# Patient Record
Sex: FEMALE | Race: WHITE | NOT HISPANIC OR LATINO | Employment: OTHER | URBAN - METROPOLITAN AREA
[De-identification: names, ages, dates, MRNs, and addresses within clinical notes are randomized per-mention and may not be internally consistent; named-entity substitution may affect disease eponyms.]

---

## 2019-02-11 ENCOUNTER — HOSPITAL ENCOUNTER (INPATIENT)
Facility: CLINIC | Age: 79
LOS: 15 days | Discharge: NURSING, REHAB OR LONG-TERM CARE | DRG: 383 | End: 2019-02-26
Attending: EMERGENCY MEDICINE | Admitting: INTERNAL MEDICINE
Payer: MEDICARE

## 2019-02-11 ENCOUNTER — APPOINTMENT (OUTPATIENT)
Dept: CT IMAGING | Facility: CLINIC | Age: 79
DRG: 383 | End: 2019-02-11
Attending: EMERGENCY MEDICINE
Payer: MEDICARE

## 2019-02-11 DIAGNOSIS — I63.9 CEREBROVASCULAR ACCIDENT (CVA), UNSPECIFIED MECHANISM (H): Primary | ICD-10-CM

## 2019-02-11 DIAGNOSIS — K25.4 GASTROINTESTINAL HEMORRHAGE ASSOCIATED WITH GASTRIC ULCER: ICD-10-CM

## 2019-02-11 DIAGNOSIS — E87.6 HYPOKALEMIA: ICD-10-CM

## 2019-02-11 DIAGNOSIS — I10 ESSENTIAL HYPERTENSION: ICD-10-CM

## 2019-02-11 DIAGNOSIS — I50.33 ACUTE ON CHRONIC DIASTOLIC CONGESTIVE HEART FAILURE (H): ICD-10-CM

## 2019-02-11 DIAGNOSIS — G40.909 SEIZURE DISORDER (H): ICD-10-CM

## 2019-02-11 DIAGNOSIS — F03.90 DEMENTIA WITHOUT BEHAVIORAL DISTURBANCE, UNSPECIFIED DEMENTIA TYPE: ICD-10-CM

## 2019-02-11 LAB
ALBUMIN SERPL-MCNC: 3.7 G/DL (ref 3.4–5)
ALP SERPL-CCNC: 77 U/L (ref 40–150)
ALT SERPL W P-5'-P-CCNC: 15 U/L (ref 0–50)
ANION GAP SERPL CALCULATED.3IONS-SCNC: 9 MMOL/L (ref 3–14)
ANISOCYTOSIS BLD QL SMEAR: SLIGHT
AST SERPL W P-5'-P-CCNC: 18 U/L (ref 0–45)
BASOPHILS # BLD AUTO: 0 10E9/L (ref 0–0.2)
BASOPHILS NFR BLD AUTO: 0.5 %
BILIRUB SERPL-MCNC: 0.4 MG/DL (ref 0.2–1.3)
BUN SERPL-MCNC: 14 MG/DL (ref 7–30)
CALCIUM SERPL-MCNC: 8.4 MG/DL (ref 8.5–10.1)
CHLORIDE SERPL-SCNC: 96 MMOL/L (ref 94–109)
CO2 SERPL-SCNC: 33 MMOL/L (ref 20–32)
CREAT SERPL-MCNC: 1.01 MG/DL (ref 0.52–1.04)
DIFFERENTIAL METHOD BLD: ABNORMAL
ELLIPTOCYTES BLD QL SMEAR: SLIGHT
EOSINOPHIL # BLD AUTO: 0 10E9/L (ref 0–0.7)
EOSINOPHIL NFR BLD AUTO: 0.3 %
ERYTHROCYTE [DISTWIDTH] IN BLOOD BY AUTOMATED COUNT: 15.3 % (ref 10–15)
GFR SERPL CREATININE-BSD FRML MDRD: 52 ML/MIN/{1.73_M2}
GLUCOSE SERPL-MCNC: 107 MG/DL (ref 70–99)
HCT VFR BLD AUTO: 37.5 % (ref 35–47)
HGB BLD-MCNC: 11.6 G/DL (ref 11.7–15.7)
IMM GRANULOCYTES # BLD: 0 10E9/L (ref 0–0.4)
IMM GRANULOCYTES NFR BLD: 0.2 %
LIPASE SERPL-CCNC: 39 U/L (ref 73–393)
LYMPHOCYTES # BLD AUTO: 1.2 10E9/L (ref 0.8–5.3)
LYMPHOCYTES NFR BLD AUTO: 19.2 %
MCH RBC QN AUTO: 26.9 PG (ref 26.5–33)
MCHC RBC AUTO-ENTMCNC: 30.9 G/DL (ref 31.5–36.5)
MCV RBC AUTO: 87 FL (ref 78–100)
MONOCYTES # BLD AUTO: 1.3 10E9/L (ref 0–1.3)
MONOCYTES NFR BLD AUTO: 20.3 %
NEUTROPHILS # BLD AUTO: 3.8 10E9/L (ref 1.6–8.3)
NEUTROPHILS NFR BLD AUTO: 59.5 %
NRBC # BLD AUTO: 0 10*3/UL
NRBC BLD AUTO-RTO: 0 /100
NT-PROBNP SERPL-MCNC: 5531 PG/ML (ref 0–1800)
PLATELET # BLD AUTO: 245 10E9/L (ref 150–450)
PLATELET # BLD EST: ABNORMAL 10*3/UL
POTASSIUM SERPL-SCNC: 2.7 MMOL/L (ref 3.4–5.3)
PROT SERPL-MCNC: 6.9 G/DL (ref 6.8–8.8)
RBC # BLD AUTO: 4.31 10E12/L (ref 3.8–5.2)
SODIUM SERPL-SCNC: 138 MMOL/L (ref 133–144)
WBC # BLD AUTO: 6.4 10E9/L (ref 4–11)

## 2019-02-11 PROCEDURE — 99222 1ST HOSP IP/OBS MODERATE 55: CPT | Mod: AI | Performed by: INTERNAL MEDICINE

## 2019-02-11 PROCEDURE — 83880 ASSAY OF NATRIURETIC PEPTIDE: CPT | Performed by: EMERGENCY MEDICINE

## 2019-02-11 PROCEDURE — 83690 ASSAY OF LIPASE: CPT | Performed by: EMERGENCY MEDICINE

## 2019-02-11 PROCEDURE — 82565 ASSAY OF CREATININE: CPT

## 2019-02-11 PROCEDURE — 25000125 ZZHC RX 250: Performed by: EMERGENCY MEDICINE

## 2019-02-11 PROCEDURE — 99285 EMERGENCY DEPT VISIT HI MDM: CPT | Mod: 25

## 2019-02-11 PROCEDURE — 96368 THER/DIAG CONCURRENT INF: CPT

## 2019-02-11 PROCEDURE — 12000000 ZZH R&B MED SURG/OB

## 2019-02-11 PROCEDURE — 96366 THER/PROPH/DIAG IV INF ADDON: CPT

## 2019-02-11 PROCEDURE — 96365 THER/PROPH/DIAG IV INF INIT: CPT

## 2019-02-11 PROCEDURE — 25000128 H RX IP 250 OP 636: Performed by: EMERGENCY MEDICINE

## 2019-02-11 PROCEDURE — 85025 COMPLETE CBC W/AUTO DIFF WBC: CPT | Performed by: EMERGENCY MEDICINE

## 2019-02-11 PROCEDURE — 25000132 ZZH RX MED GY IP 250 OP 250 PS 637: Mod: GY | Performed by: EMERGENCY MEDICINE

## 2019-02-11 PROCEDURE — 80053 COMPREHEN METABOLIC PANEL: CPT | Performed by: EMERGENCY MEDICINE

## 2019-02-11 PROCEDURE — 96375 TX/PRO/DX INJ NEW DRUG ADDON: CPT

## 2019-02-11 PROCEDURE — 93005 ELECTROCARDIOGRAM TRACING: CPT

## 2019-02-11 PROCEDURE — 74177 CT ABD & PELVIS W/CONTRAST: CPT

## 2019-02-11 PROCEDURE — A9270 NON-COVERED ITEM OR SERVICE: HCPCS | Mod: GY | Performed by: EMERGENCY MEDICINE

## 2019-02-11 PROCEDURE — 25800030 ZZH RX IP 258 OP 636: Performed by: EMERGENCY MEDICINE

## 2019-02-11 RX ORDER — BUMETANIDE 1 MG/1
2 TABLET ORAL EVERY MORNING
Status: ON HOLD | COMMUNITY
End: 2019-02-25

## 2019-02-11 RX ORDER — OXYCODONE AND ACETAMINOPHEN 5; 325 MG/1; MG/1
1 TABLET ORAL ONCE
Status: DISCONTINUED | OUTPATIENT
Start: 2019-02-11 | End: 2019-02-11

## 2019-02-11 RX ORDER — ASPIRIN 81 MG/1
81 TABLET ORAL DAILY
Status: ON HOLD | COMMUNITY
End: 2019-02-26

## 2019-02-11 RX ORDER — NITROGLYCERIN 0.4 MG/1
0.4 TABLET SUBLINGUAL EVERY 5 MIN PRN
COMMUNITY

## 2019-02-11 RX ORDER — GABAPENTIN 600 MG/1
600 TABLET ORAL 2 TIMES DAILY
Status: ON HOLD | COMMUNITY
End: 2019-02-25

## 2019-02-11 RX ORDER — ONDANSETRON 2 MG/ML
4 INJECTION INTRAMUSCULAR; INTRAVENOUS ONCE
Status: COMPLETED | OUTPATIENT
Start: 2019-02-11 | End: 2019-02-11

## 2019-02-11 RX ORDER — OXYCODONE AND ACETAMINOPHEN 5; 325 MG/1; MG/1
1 TABLET ORAL DAILY PRN
Status: ON HOLD | COMMUNITY
End: 2019-02-25

## 2019-02-11 RX ORDER — LANOLIN ALCOHOL/MO/W.PET/CERES
1000 CREAM (GRAM) TOPICAL DAILY
COMMUNITY

## 2019-02-11 RX ORDER — ALBUTEROL SULFATE 90 UG/1
2-4 AEROSOL, METERED RESPIRATORY (INHALATION) EVERY 4 HOURS PRN
COMMUNITY

## 2019-02-11 RX ORDER — CARVEDILOL 12.5 MG/1
12.5 TABLET ORAL 2 TIMES DAILY WITH MEALS
Status: ON HOLD | COMMUNITY
End: 2019-02-25

## 2019-02-11 RX ORDER — OXYBUTYNIN CHLORIDE 15 MG/1
15 TABLET, EXTENDED RELEASE ORAL DAILY
Status: ON HOLD | COMMUNITY
End: 2019-02-25

## 2019-02-11 RX ORDER — ATORVASTATIN CALCIUM 20 MG/1
20 TABLET, FILM COATED ORAL DAILY
Status: ON HOLD | COMMUNITY
End: 2019-02-25

## 2019-02-11 RX ORDER — AMLODIPINE BESYLATE 10 MG/1
10 TABLET ORAL DAILY
Status: ON HOLD | COMMUNITY
End: 2019-02-25

## 2019-02-11 RX ORDER — ACETAMINOPHEN 325 MG/1
325-650 TABLET ORAL EVERY 4 HOURS PRN
COMMUNITY

## 2019-02-11 RX ORDER — POTASSIUM CHLORIDE 1.5 G/1.58G
40 POWDER, FOR SOLUTION ORAL ONCE
Status: COMPLETED | OUTPATIENT
Start: 2019-02-11 | End: 2019-02-11

## 2019-02-11 RX ORDER — POTASSIUM CHLORIDE 1500 MG/1
20 TABLET, EXTENDED RELEASE ORAL DAILY
Status: ON HOLD | COMMUNITY
End: 2019-02-25

## 2019-02-11 RX ORDER — POTASSIUM CL/LIDO/0.9 % NACL 10MEQ/0.1L
10 INTRAVENOUS SOLUTION, PIGGYBACK (ML) INTRAVENOUS
Status: DISCONTINUED | OUTPATIENT
Start: 2019-02-11 | End: 2019-02-16

## 2019-02-11 RX ORDER — BUMETANIDE 1 MG/1
1 TABLET ORAL
Status: ON HOLD | COMMUNITY
End: 2019-02-25

## 2019-02-11 RX ORDER — IOPAMIDOL 755 MG/ML
500 INJECTION, SOLUTION INTRAVASCULAR ONCE
Status: COMPLETED | OUTPATIENT
Start: 2019-02-11 | End: 2019-02-11

## 2019-02-11 RX ORDER — LISINOPRIL 5 MG/1
5 TABLET ORAL DAILY
Status: ON HOLD | COMMUNITY
End: 2019-02-25

## 2019-02-11 RX ADMIN — IOPAMIDOL 80 ML: 755 INJECTION, SOLUTION INTRAVENOUS at 19:37

## 2019-02-11 RX ADMIN — Medication 2 G: at 21:41

## 2019-02-11 RX ADMIN — POTASSIUM CHLORIDE 10 MEQ: 149 INJECTION, SOLUTION, CONCENTRATE INTRAVENOUS at 20:30

## 2019-02-11 RX ADMIN — SODIUM CHLORIDE 61 ML: 9 INJECTION, SOLUTION INTRAVENOUS at 19:37

## 2019-02-11 RX ADMIN — LIDOCAINE HYDROCHLORIDE 30 ML: 20 SOLUTION ORAL; TOPICAL at 21:33

## 2019-02-11 RX ADMIN — POTASSIUM CHLORIDE 40 MEQ: 1.5 POWDER, FOR SOLUTION ORAL at 20:30

## 2019-02-11 RX ADMIN — ONDANSETRON 4 MG: 2 INJECTION INTRAMUSCULAR; INTRAVENOUS at 19:19

## 2019-02-11 ASSESSMENT — ENCOUNTER SYMPTOMS
HEMATURIA: 0
FREQUENCY: 0
SHORTNESS OF BREATH: 0
VOMITING: 1
APPETITE CHANGE: 0
DYSURIA: 0
ABDOMINAL PAIN: 1

## 2019-02-11 NOTE — LETTER
Transition Communication Hand-off for Care Transitions to Next Level of Care Provider    Name: Rhonda Mathur  : 1940  MRN #: [id number]  Primary Care Provider: Diego Taveras  Primary Care MD Name: Darcy   Primary Clinic: [facility] [address] [facility] [address]  Primary Care Clinic Name: [facility]   Reason for Hospitalization:  Hypokalemia [E87.6]  Acute on chronic diastolic congestive heart failure (H) [I50.33]  Acute cerebrovascular accident (CVA) (H) [I63.9]  Admit Date/Time: 2019  6:35 PM  Discharge Date: 19  Payor Source: Payor: MEDICARE / Plan: MEDICARE / Product Type: Medicare /     Readmission Assessment Measure (LINA) Risk Score/category: Average          Reason for Communication Hand-off Referral: Admission diagnoses: CHF  Fragility  Difficulty understanding plan of care    Discharge Plan: [facility]       Concern for non-adherence with plan of care:   No  Discharge Needs Assessment:  Needs      Most Recent Value   Equipment Currently Used at Home  none   # of Referrals Placed by CTS  Post Acute Facilities   Transitional Care  -- [[facility]]          Already enrolled in Tele-monitoring program and name of program:  NA  Follow-up specialty is recommended: No    Follow-up plan:    Future Appointments   Date Time Provider Department Center   2019  8:00 AM Enedina Billy PTA [facility]       Any outstanding tests or procedures:    Procedures     Future Labs/Procedures    HC ZIO PATCH 48 HOURS APPLICATION     Scheduling Instructions:    For 5 days          Referrals     Future Labs/Procedures    Occupational Therapy Adult Consult     Comments:    Evaluate and treat as clinically indicated.    Reason:  weakness    Physical Therapy Adult Consult     Comments:    Evaluate and treat as clinically indicated.    Reason:  weakness            Key Recommendations:  CTS following assessing for needs in 79 year old pt with hx of  "afib/chf admitted with hypokalemia.     Pt reports that she lives independently in her own apartment at the same senior living facility as her  that is currently in Memory Care.  She spends a lot of her day if not all of her day visiting and caring for him, [place] right [place] the [place]. She did explain to me that she is understanding that she has CHF, she does not weigh her self daily \" I did at first but I was never gaining wt so I stopped\" and rarely uses salt.  She explains that she was supposed to follow up with her Cardiologist at [facility] \"in the Summer\" but never did.   Patient was admitted with an acute stroke and partial seizures. Neurology was consulted and following with patient. Patient has mild to moderate dementia. Neurology started keppra and other medications. It is recommended that patient have LFT labs checked at next appointment.     Please follow pt closely upon return home for on going assessment in the care of her CHF, also assessing for medication understanding and compliance.     Sally Warner & Alesha Lopez    AVS/Discharge Summary is the source of truth; this is a helpful guide for improved communication of patient story          "

## 2019-02-11 NOTE — ED TRIAGE NOTES
Sent to the ED with upper abdominal pain. REports has been having pain intermittently for the past several months. States pain is worse after eating. Also has been nauseated.

## 2019-02-12 PROBLEM — E87.6 HYPOKALEMIA: Status: ACTIVE | Noted: 2019-02-12

## 2019-02-12 LAB
ANION GAP SERPL CALCULATED.3IONS-SCNC: 6 MMOL/L (ref 3–14)
BUN SERPL-MCNC: 12 MG/DL (ref 7–30)
CALCIUM SERPL-MCNC: 8.5 MG/DL (ref 8.5–10.1)
CHLORIDE SERPL-SCNC: 101 MMOL/L (ref 94–109)
CO2 SERPL-SCNC: 31 MMOL/L (ref 20–32)
CREAT BLD-MCNC: 1.1 MG/DL (ref 0.52–1.04)
CREAT SERPL-MCNC: 0.94 MG/DL (ref 0.52–1.04)
GFR SERPL CREATININE-BSD FRML MDRD: 48 ML/MIN/{1.73_M2}
GFR SERPL CREATININE-BSD FRML MDRD: 57 ML/MIN/{1.73_M2}
GLUCOSE BLDC GLUCOMTR-MCNC: 104 MG/DL (ref 70–99)
GLUCOSE BLDC GLUCOMTR-MCNC: 107 MG/DL (ref 70–99)
GLUCOSE BLDC GLUCOMTR-MCNC: 108 MG/DL (ref 70–99)
GLUCOSE BLDC GLUCOMTR-MCNC: 123 MG/DL (ref 70–99)
GLUCOSE BLDC GLUCOMTR-MCNC: 157 MG/DL (ref 70–99)
GLUCOSE SERPL-MCNC: 99 MG/DL (ref 70–99)
HBA1C MFR BLD: 6.3 % (ref 0–5.6)
INTERPRETATION ECG - MUSE: NORMAL
MAGNESIUM SERPL-MCNC: 2 MG/DL (ref 1.6–2.3)
POTASSIUM SERPL-SCNC: 3 MMOL/L (ref 3.4–5.3)
POTASSIUM SERPL-SCNC: 3.8 MMOL/L (ref 3.4–5.3)
SODIUM SERPL-SCNC: 138 MMOL/L (ref 133–144)

## 2019-02-12 PROCEDURE — 00000146 ZZHCL STATISTIC GLUCOSE BY METER IP

## 2019-02-12 PROCEDURE — 83735 ASSAY OF MAGNESIUM: CPT | Performed by: INTERNAL MEDICINE

## 2019-02-12 PROCEDURE — 12000000 ZZH R&B MED SURG/OB

## 2019-02-12 PROCEDURE — A9270 NON-COVERED ITEM OR SERVICE: HCPCS | Mod: GY | Performed by: INTERNAL MEDICINE

## 2019-02-12 PROCEDURE — 99232 SBSQ HOSP IP/OBS MODERATE 35: CPT | Performed by: INTERNAL MEDICINE

## 2019-02-12 PROCEDURE — 84132 ASSAY OF SERUM POTASSIUM: CPT | Performed by: INTERNAL MEDICINE

## 2019-02-12 PROCEDURE — 80048 BASIC METABOLIC PNL TOTAL CA: CPT | Performed by: INTERNAL MEDICINE

## 2019-02-12 PROCEDURE — 25000125 ZZHC RX 250: Performed by: INTERNAL MEDICINE

## 2019-02-12 PROCEDURE — 36415 COLL VENOUS BLD VENIPUNCTURE: CPT | Performed by: INTERNAL MEDICINE

## 2019-02-12 PROCEDURE — 25000132 ZZH RX MED GY IP 250 OP 250 PS 637: Mod: GY | Performed by: INTERNAL MEDICINE

## 2019-02-12 PROCEDURE — 83036 HEMOGLOBIN GLYCOSYLATED A1C: CPT | Performed by: INTERNAL MEDICINE

## 2019-02-12 PROCEDURE — 25000131 ZZH RX MED GY IP 250 OP 636 PS 637: Mod: GY | Performed by: INTERNAL MEDICINE

## 2019-02-12 RX ORDER — ASPIRIN 81 MG/1
81 TABLET ORAL DAILY
Status: DISCONTINUED | OUTPATIENT
Start: 2019-02-12 | End: 2019-02-13

## 2019-02-12 RX ORDER — OXYCODONE AND ACETAMINOPHEN 5; 325 MG/1; MG/1
1 TABLET ORAL DAILY PRN
Status: DISCONTINUED | OUTPATIENT
Start: 2019-02-12 | End: 2019-02-16

## 2019-02-12 RX ORDER — BUMETANIDE 2 MG/1
2 TABLET ORAL EVERY MORNING
Status: DISCONTINUED | OUTPATIENT
Start: 2019-02-12 | End: 2019-02-18

## 2019-02-12 RX ORDER — ACETAMINOPHEN 325 MG/1
325-650 TABLET ORAL EVERY 4 HOURS PRN
Status: DISCONTINUED | OUTPATIENT
Start: 2019-02-12 | End: 2019-02-19 | Stop reason: ALTCHOICE

## 2019-02-12 RX ORDER — CARVEDILOL 12.5 MG/1
12.5 TABLET ORAL 2 TIMES DAILY WITH MEALS
Status: DISCONTINUED | OUTPATIENT
Start: 2019-02-12 | End: 2019-02-14

## 2019-02-12 RX ORDER — BUMETANIDE 0.5 MG/1
1 TABLET ORAL DAILY
Status: DISCONTINUED | OUTPATIENT
Start: 2019-02-12 | End: 2019-02-18

## 2019-02-12 RX ORDER — POTASSIUM CL/LIDO/0.9 % NACL 10MEQ/0.1L
10 INTRAVENOUS SOLUTION, PIGGYBACK (ML) INTRAVENOUS
Status: DISCONTINUED | OUTPATIENT
Start: 2019-02-12 | End: 2019-02-26 | Stop reason: HOSPADM

## 2019-02-12 RX ORDER — NALOXONE HYDROCHLORIDE 0.4 MG/ML
.1-.4 INJECTION, SOLUTION INTRAMUSCULAR; INTRAVENOUS; SUBCUTANEOUS
Status: DISCONTINUED | OUTPATIENT
Start: 2019-02-12 | End: 2019-02-26 | Stop reason: HOSPADM

## 2019-02-12 RX ORDER — AMOXICILLIN 250 MG
2 CAPSULE ORAL 2 TIMES DAILY PRN
Status: DISCONTINUED | OUTPATIENT
Start: 2019-02-12 | End: 2019-02-26 | Stop reason: HOSPADM

## 2019-02-12 RX ORDER — MAGNESIUM SULFATE HEPTAHYDRATE 40 MG/ML
4 INJECTION, SOLUTION INTRAVENOUS EVERY 4 HOURS PRN
Status: DISCONTINUED | OUTPATIENT
Start: 2019-02-12 | End: 2019-02-26 | Stop reason: HOSPADM

## 2019-02-12 RX ORDER — AMOXICILLIN 250 MG
1 CAPSULE ORAL 2 TIMES DAILY PRN
Status: DISCONTINUED | OUTPATIENT
Start: 2019-02-12 | End: 2019-02-26 | Stop reason: HOSPADM

## 2019-02-12 RX ORDER — POTASSIUM CHLORIDE 1500 MG/1
20 TABLET, EXTENDED RELEASE ORAL DAILY
Status: DISCONTINUED | OUTPATIENT
Start: 2019-02-12 | End: 2019-02-18

## 2019-02-12 RX ORDER — POTASSIUM CHLORIDE 29.8 MG/ML
20 INJECTION INTRAVENOUS
Status: DISCONTINUED | OUTPATIENT
Start: 2019-02-12 | End: 2019-02-26 | Stop reason: HOSPADM

## 2019-02-12 RX ORDER — ONDANSETRON 2 MG/ML
4 INJECTION INTRAMUSCULAR; INTRAVENOUS EVERY 6 HOURS PRN
Status: DISCONTINUED | OUTPATIENT
Start: 2019-02-12 | End: 2019-02-20

## 2019-02-12 RX ORDER — FAMOTIDINE 20 MG/1
20 TABLET, FILM COATED ORAL 2 TIMES DAILY
Status: DISCONTINUED | OUTPATIENT
Start: 2019-02-12 | End: 2019-02-14

## 2019-02-12 RX ORDER — LANOLIN ALCOHOL/MO/W.PET/CERES
1000 CREAM (GRAM) TOPICAL DAILY
Status: DISCONTINUED | OUTPATIENT
Start: 2019-02-12 | End: 2019-02-18

## 2019-02-12 RX ORDER — AMLODIPINE BESYLATE 10 MG/1
10 TABLET ORAL DAILY
Status: DISCONTINUED | OUTPATIENT
Start: 2019-02-12 | End: 2019-02-18

## 2019-02-12 RX ORDER — OXYBUTYNIN CHLORIDE 5 MG/1
15 TABLET, EXTENDED RELEASE ORAL DAILY
Status: DISCONTINUED | OUTPATIENT
Start: 2019-02-12 | End: 2019-02-20

## 2019-02-12 RX ORDER — POTASSIUM CHLORIDE 1.5 G/1.58G
20-40 POWDER, FOR SOLUTION ORAL
Status: DISCONTINUED | OUTPATIENT
Start: 2019-02-12 | End: 2019-02-26 | Stop reason: HOSPADM

## 2019-02-12 RX ORDER — POTASSIUM CHLORIDE 7.45 MG/ML
10 INJECTION INTRAVENOUS
Status: DISCONTINUED | OUTPATIENT
Start: 2019-02-12 | End: 2019-02-26 | Stop reason: HOSPADM

## 2019-02-12 RX ORDER — ALBUTEROL SULFATE 90 UG/1
2-4 AEROSOL, METERED RESPIRATORY (INHALATION) EVERY 4 HOURS PRN
Status: DISCONTINUED | OUTPATIENT
Start: 2019-02-12 | End: 2019-02-26 | Stop reason: HOSPADM

## 2019-02-12 RX ORDER — ONDANSETRON 4 MG/1
4 TABLET, ORALLY DISINTEGRATING ORAL EVERY 6 HOURS PRN
Status: DISCONTINUED | OUTPATIENT
Start: 2019-02-12 | End: 2019-02-20

## 2019-02-12 RX ORDER — NICOTINE POLACRILEX 4 MG
15-30 LOZENGE BUCCAL
Status: DISCONTINUED | OUTPATIENT
Start: 2019-02-12 | End: 2019-02-26 | Stop reason: HOSPADM

## 2019-02-12 RX ORDER — DEXTROSE MONOHYDRATE 25 G/50ML
25-50 INJECTION, SOLUTION INTRAVENOUS
Status: DISCONTINUED | OUTPATIENT
Start: 2019-02-12 | End: 2019-02-26 | Stop reason: HOSPADM

## 2019-02-12 RX ORDER — POTASSIUM CHLORIDE 1500 MG/1
20-40 TABLET, EXTENDED RELEASE ORAL
Status: DISCONTINUED | OUTPATIENT
Start: 2019-02-12 | End: 2019-02-26 | Stop reason: HOSPADM

## 2019-02-12 RX ORDER — LISINOPRIL 5 MG/1
5 TABLET ORAL DAILY
Status: DISCONTINUED | OUTPATIENT
Start: 2019-02-12 | End: 2019-02-18

## 2019-02-12 RX ORDER — GABAPENTIN 600 MG/1
600 TABLET ORAL 2 TIMES DAILY
Status: DISCONTINUED | OUTPATIENT
Start: 2019-02-12 | End: 2019-02-15

## 2019-02-12 RX ORDER — ATORVASTATIN CALCIUM 20 MG/1
20 TABLET, FILM COATED ORAL DAILY
Status: DISCONTINUED | OUTPATIENT
Start: 2019-02-12 | End: 2019-02-18

## 2019-02-12 RX ADMIN — GABAPENTIN 600 MG: 600 TABLET, FILM COATED ORAL at 08:14

## 2019-02-12 RX ADMIN — LISINOPRIL 5 MG: 5 TABLET ORAL at 08:14

## 2019-02-12 RX ADMIN — BUMETANIDE 1 MG: 0.5 TABLET ORAL at 15:30

## 2019-02-12 RX ADMIN — CARVEDILOL 12.5 MG: 12.5 TABLET, FILM COATED ORAL at 17:04

## 2019-02-12 RX ADMIN — POTASSIUM CHLORIDE 20 MEQ: 1500 TABLET, EXTENDED RELEASE ORAL at 05:33

## 2019-02-12 RX ADMIN — ASPIRIN 81 MG: 81 TABLET, COATED ORAL at 08:14

## 2019-02-12 RX ADMIN — APIXABAN 5 MG: 5 TABLET, FILM COATED ORAL at 08:13

## 2019-02-12 RX ADMIN — CYANOCOBALAMIN TAB 1000 MCG 1000 MCG: 1000 TAB at 08:14

## 2019-02-12 RX ADMIN — APIXABAN 5 MG: 5 TABLET, FILM COATED ORAL at 20:31

## 2019-02-12 RX ADMIN — CARVEDILOL 12.5 MG: 12.5 TABLET, FILM COATED ORAL at 08:13

## 2019-02-12 RX ADMIN — FAMOTIDINE 20 MG: 10 INJECTION, SOLUTION INTRAVENOUS at 03:02

## 2019-02-12 RX ADMIN — FAMOTIDINE 20 MG: 20 TABLET ORAL at 20:31

## 2019-02-12 RX ADMIN — BUMETANIDE 2 MG: 2 TABLET ORAL at 08:14

## 2019-02-12 RX ADMIN — ATORVASTATIN CALCIUM 20 MG: 20 TABLET, FILM COATED ORAL at 08:14

## 2019-02-12 RX ADMIN — OXYCODONE HYDROCHLORIDE AND ACETAMINOPHEN 1 TABLET: 5; 325 TABLET ORAL at 13:03

## 2019-02-12 RX ADMIN — OXYBUTYNIN CHLORIDE 15 MG: 5 TABLET, EXTENDED RELEASE ORAL at 08:13

## 2019-02-12 RX ADMIN — POTASSIUM CHLORIDE 40 MEQ: 1500 TABLET, EXTENDED RELEASE ORAL at 02:54

## 2019-02-12 RX ADMIN — POTASSIUM CHLORIDE 20 MEQ: 1500 TABLET, EXTENDED RELEASE ORAL at 08:13

## 2019-02-12 RX ADMIN — AMLODIPINE BESYLATE 10 MG: 10 TABLET ORAL at 08:14

## 2019-02-12 RX ADMIN — INSULIN ASPART 1 UNITS: 100 INJECTION, SOLUTION INTRAVENOUS; SUBCUTANEOUS at 12:46

## 2019-02-12 RX ADMIN — ACETAMINOPHEN 650 MG: 325 TABLET, FILM COATED ORAL at 20:31

## 2019-02-12 RX ADMIN — FAMOTIDINE 20 MG: 20 TABLET ORAL at 08:14

## 2019-02-12 RX ADMIN — METFORMIN HYDROCHLORIDE 500 MG: 500 TABLET ORAL at 08:14

## 2019-02-12 RX ADMIN — METFORMIN HYDROCHLORIDE 500 MG: 500 TABLET ORAL at 17:04

## 2019-02-12 RX ADMIN — GABAPENTIN 600 MG: 600 TABLET, FILM COATED ORAL at 20:31

## 2019-02-12 ASSESSMENT — ACTIVITIES OF DAILY LIVING (ADL)
TOILETING: 0-->INDEPENDENT
RETIRED_EATING: 0-->INDEPENDENT
DRESS: 0-->INDEPENDENT
ADLS_ACUITY_SCORE: 14
ADLS_ACUITY_SCORE: 16
ADLS_ACUITY_SCORE: 16
TRANSFERRING: 0-->INDEPENDENT
COGNITION: 0 - NO COGNITION ISSUES REPORTED
NUMBER_OF_TIMES_PATIENT_HAS_FALLEN_WITHIN_LAST_SIX_MONTHS: 2
SWALLOWING: 2-->DIFFICULTY SWALLOWING FOODS
AMBULATION: 0-->INDEPENDENT
FALL_HISTORY_WITHIN_LAST_SIX_MONTHS: YES
RETIRED_COMMUNICATION: 0-->UNDERSTANDS/COMMUNICATES WITHOUT DIFFICULTY
ADLS_ACUITY_SCORE: 17
BATHING: 0-->INDEPENDENT
ADLS_ACUITY_SCORE: 16

## 2019-02-12 ASSESSMENT — MIFFLIN-ST. JEOR: SCORE: 1206.6

## 2019-02-12 NOTE — CONSULTS
"Care Transition Initial Assessment - RN        Met with: Patient.  DATA   Active Problems:    Hypokalemia       Cognitive Status: awake, alert and oriented.        Contact information and PCP information verified: Yes  Lives With: alone   Living Arrangements: independent living facility                 Insurance concerns: No Insurance issues identified  ASSESSMENT  Patient currently receives the following services:  NONE         Identified issues/concerns regarding health management:     CTS following 79 year old noted to have hx of AFIB and CHF admitted with Hypokalemia.   Per pt pt lives in Independent Living apartment at [facility] she currently drives and preforms all daily living activities. Pt notes that her  lives in the memory care unit there as well. She spends most of her day with him taking care of his needs , she walks \"alot during day between her apt and his\"     Pt reports that she was supposed to follow up with Cardiology \"in the Summer \" yet she did not.  She noted that she does not wt daily and \"dont really eat a lot of salt anyway\". Pt explains that she started off at first weighting herself and then \"I wasn't gaining any wt so I stopped.\"  Pt is familiar with her medications and denies any need for education on her medications.     Not anticipating any increased service or care needs on discharge at this time.      Will continue to follow along and assist with Follow up rec's as needed.     Sally Warner RN, BSN, Care Transitions Specialist   Care Transitions Team  [phone]                            "

## 2019-02-12 NOTE — ED NOTES
[facility]  ED Nurse Handoff Report    Rhonda Mathur is a 79 year old female with a history of diabetes and chronic heart failure, and previous cholecystectomy, who presents with her daughter to the emergency department with concern for abdominal pain. The patient reports that she has had epigastric abdominal pain and back pain for three months, which has begun to interfere with her sleep, prompting her to visit her primary care provider a week ago. On a recheck with her primary care provider today, she informed them that her prescribed omeprazole was not providing any relief, and so they instructed her to present here. She states she has had decreased appetite secondary to pain, and has begun to lose weight. The daughter notes that the patient has also been intermittently complaining of right flank pain and normal colored emesis as well. She denies any urinary or respiratory symptoms, or bowel movement abnormalities. She has been attempting to manage her symptoms with Oxycodone at home to no relief.       ED Chief complaint: Abdominal Pain  . ED Diagnosis:   Final diagnoses:   Hypokalemia     Allergies: No Known Allergies    Code Status: Full Code  Activity level - Baseline/Home:  Independent. Activity Level - Current:   Stand with Assist. Lift room needed: No. Bariatric: No   Needed: No   Isolation: No. Infection: Not Applicable.     Vital Signs:   Vitals:    02/11/19 1739   BP: 115/75   Pulse: 91   Resp: 16   Temp: 98.6  F (37  C)   SpO2: 98%       Cardiac Rhythm:  ,      Pain level: 0-10 Pain Scale: 0  Patient confused: No. Patient Falls Risk: Yes.   Elimination Status: Has voided   Patient Report - Initial Complaint: abdominal pain . Focused Assessment:    20:46 Gastrointestinal Gastrointestinal - GI WDL: nausea and vomiting (pt aox4, abcs intact. pt arrives complaining of intermittent abdominal pain that gets worse when she eats. decreased appetite) Nausea/Vomiting Signs/Symptoms:  nausea continuous Nausea/Vomiting Interventions: antiemetic All Quadrants Abdominal Palpation: tender (upper abdomen tender with pain that radiates to her back)       Tests Performed: CT, blood work. Abnormal Results:   Labs Ordered and Resulted from Time of ED Arrival Up to the Time of Departure from the ED   COMPREHENSIVE METABOLIC PANEL - Abnormal; Notable for the following components:       Result Value    Potassium 2.7 (*)     Carbon Dioxide 33 (*)     Glucose 107 (*)     GFR Estimate 52 (*)     GFR Estimate If Black 60 (*)     Calcium 8.4 (*)     All other components within normal limits   CBC WITH PLATELETS DIFFERENTIAL - Abnormal; Notable for the following components:    Hemoglobin 11.6 (*)     MCHC 30.9 (*)     RDW 15.3 (*)     All other components within normal limits   LIPASE - Abnormal; Notable for the following components:    Lipase 39 (*)     All other components within normal limits   NT PROBNP INPATIENT   ROUTINE UA WITH MICROSCOPIC   ISTAT CREATININE NURSING POCT     Abd/pelvis CT,  IV  contrast only TRAUMA / AAA   Preliminary Result   IMPRESSION:   1. No acute abnormality. No bowel obstruction or inflammation.   2. Cardiomegaly.        .   Treatments provided: potassium (IV/PO), zofran  Family Comments: daughter at bedside  OBS brochure/video discussed/provided to patient:  Yes  ED Medications:   Medications   magnesium sulfate 2 g in NS intermittent infusion (PharMEDium or FV Cmpd) (not administered)   potassium chloride 10 mEq in 100 mL intermittent infusion with 10 mg lidocaine (10 mEq Intravenous New Bag 2/11/19 2030)   ondansetron (ZOFRAN) injection 4 mg (4 mg Intravenous Given 2/11/19 1919)   0.9% sodium chloride BOLUS (0 mLs Intravenous Stopped 2/1940)   iopamidol (ISOVUE-370) solution 500 mL (80 mLs Intravenous Given 2/11/19 1937)   potassium chloride (KLOR-CON) Packet 40 mEq (40 mEq Oral Given 2/11/19 2030)     Drips infusing:  Yes  For the majority of the shift, the patient's  behavior Green. Interventions performed were frequent roumding.     Severe Sepsis OR Septic Shock Diagnosis Present: No      ED Nurse Name/Phone Number: Afua Reveles,   8:45 PM  RECEIVING UNIT ED HANDOFF REVIEW    Above ED Nurse Handoff Report was reviewed: Yes  Reviewed by: Luz Carroll on February 11, 2019 at 11:54 PM

## 2019-02-12 NOTE — H&P
[facility]  Hospitalist Admission Note  Name: Rhonda Mathur    MRN: [id number]  YOB: 1940    Age: 79 year old  Date of admission: 2/11/2019  Primary care provider: Diego Taveras            Assessment and Plan:   Rhonda Mathur is a 79 year old female with multiple medical history such as chronic atrial fibrillation on oral anticoagulation, CAD, chronic heart failure with preserved ejection fraction, hypertension, diabetes mellitus non-insulin-requiring, dyslipidemia, prior anemia, currently living independently at home and presented in the emergency room mostly due to increasing episodes of abdominal discomfort and pain leading to decreased oral intake at least for the past 1-2 days.      1.  Abdominal pain/discomfort  -No overt pathology seen in the CT scan of the abdomen and pelvis  -No red flag symptoms of bleeding, denies any nausea or vomiting  -Suspecting with possible peptic ulcer disease.  This was also working diagnosis earlier in the outpatient setting.  IV Pepcid to be given.  -Patient has an established providers/physicians at [facility] and she mentioned that she is being referred back to her GI specialist  -She had numerous investigation in the past with colonoscopy, endoscopy due to anemia and found with AVMs and successfully ablated in the past via push enteroscopy.    2.  Critical hypokalemia-likely brought about by her diuretics use, decrease oral intake due to #1  -Telemetry monitoring for tonight.  Aggressive supplementation.  Resumption of her potassium supplements regimen.  -We will resume back her Bumex in the morning    3.  Chronic CHF with preserved ejection fraction-  -elevated proBNP but no overt symptoms of CHF flare, denies any worsening leg swelling, denies shortness of breath, not hypoxic  -I do not see indication in escalating her diuresis.  Will continue with home regimen of Bumex oral route.  4.  Chronic A. fib on chronic anticoagulation-resume  her oral anticoagulant with Eliquis  -Resume beta-blockers with Coreg    5.  Diabetes mellitus type 2  -Resume metformin    6.  Hypertension-on lisinopril, above beta-blockers and Norvasc    Code status: DNR/DNI as expressed by the patient.  Will respect and follow and will convert to medical order.  Prophylaxis: on eliquis  Disposition: Home likely in 1-2 days          Chief Complaint:   Intermittent abdominal pain, worse today       Source of Information:   Patient with good reliability  Discussion with ED physician  Review of E chart records         History of Present Illness:   Rhonda Mathur is a 79 year old female with multiple medical history such as chronic atrial fibrillation on oral anticoagulation, CAD, chronic heart failure with preserved ejection fraction, hypertension, diabetes mellitus non-insulin-requiring, dyslipidemia, prior anemia, currently living independently at home and presented in the emergency room mostly due to increasing episodes of abdominal discomfort and pain leading to decreased oral intake at least for the past 1-2 days.  She stated that she is been having issues with intermittent abdominal symptoms such as the one she has today and was provided with PPI by her clinic providers felt that she is not getting relief of symptoms.  However she denies any other symptomatology such as vomiting, bleeding tendencies like melanotic stools, coffee-ground emesis, bright red blood per rectum, denies any urinary symptomatology, no ongoing shortness of breath, chest pain, palpitations, back pain, fall event or mental status changes.  Upon presentation she underwent CT scan of the abdomen and pelvis which showed no obvious pathology or issues.  Found with critical hypokalemia and elevated proBNP with concerns if she is also having exacerbation of her heart failure.  She remain hemodynamically stable and no evidence of hypoxia seen.  She is also afebrile.    During the time of my exam she already  received electrolyte supplementation,Mylanta and had some significant improvement with earlier abdominal pain and discomfort.  She denies any other complaints.              Past Medical History:   As listed above in the HPI          Past Surgical History:   Prior PCI          Social History:     Social History     Tobacco Use     Smoking status: Not smoker   Substance Use Topics     Alcohol use:  Denies alcohol use             Family History:   Family history was fully reviewed and non-contributory in this case.         Allergies:   No Known Allergies          Medications:     Prior to Admission medications    Medication Sig Last Dose Taking? Auth Provider   acetaminophen (TYLENOL) 325 MG tablet Take 325-650 mg by mouth every 4 hours as needed for mild pain  Yes Unknown, Entered By History   albuterol (PROAIR HFA/PROVENTIL HFA/VENTOLIN HFA) 108 (90 Base) MCG/ACT inhaler Inhale 2-4 puffs into the lungs every 4 hours as needed for shortness of breath / dyspnea or wheezing  Yes Unknown, Entered By History   amLODIPine (NORVASC) 10 MG tablet Take 10 mg by mouth daily 2/11/2019 at Unknown time Yes Unknown, Entered By History   apixaban ANTICOAGULANT (ELIQUIS) 5 MG tablet Take 5 mg by mouth 2 times daily 2/11/2019 at x1 Yes Unknown, Entered By History   aspirin 81 MG EC tablet Take 81 mg by mouth daily 2/11/2019 at Unknown time Yes Unknown, Entered By History   atorvastatin (LIPITOR) 20 MG tablet Take 20 mg by mouth daily 2/11/2019 at Unknown time Yes Unknown, Entered By History   bumetanide (BUMEX) 1 MG tablet Take 2 mg by mouth every morning 2/11/2019 at Unknown time Yes Unknown, Entered By History   bumetanide (BUMEX) 1 MG tablet Take 1 mg by mouth In afternoon 2/10/2019 at Unknown time Yes Unknown, Entered By History   carvedilol (COREG) 12.5 MG tablet Take 12.5 mg by mouth 2 times daily (with meals) 2/11/2019 at x1 Yes Unknown, Entered By History   gabapentin (NEURONTIN) 600 MG tablet Take 600 mg by mouth 2 times  daily 2/11/2019 at x1 Yes Unknown, Entered By History   lisinopril (PRINIVIL/ZESTRIL) 5 MG tablet Take 5 mg by mouth daily 2/11/2019 at Unknown time Yes Unknown, Entered By History   metFORMIN (GLUCOPHAGE) 500 MG tablet Take 500 mg by mouth 2 times daily (with meals) 2/11/2019 at x1 Yes Unknown, Entered By History   nitroGLYcerin (NITROSTAT) 0.4 MG sublingual tablet Place 0.4 mg under the tongue every 5 minutes as needed for chest pain For chest pain place 1 tablet under the tongue every 5 minutes for 3 doses. If symptoms persist 5 minutes after 1st dose call 911.  Yes Unknown, Entered By History   omeprazole (PRILOSEC) 20 MG DR capsule Take 20 mg by mouth 2 times daily 2/11/2019 at x1 Yes Unknown, Entered By History   oxybutynin ER (DITROPAN XL) 15 MG 24 hr tablet Take 15 mg by mouth daily 2/11/2019 at Unknown time Yes Unknown, Entered By History   oxyCODONE-acetaminophen (PERCOCET) 5-325 MG tablet Take 1 tablet by mouth daily as needed for severe pain  Yes Unknown, Entered By History   potassium chloride ER (K-DUR/KLOR-CON M) 20 MEQ CR tablet Take 20 mEq by mouth daily 2/11/2019 at Unknown time Yes Unknown, Entered By History   vitamin B-12 (CYANOCOBALAMIN) 1000 MCG tablet Take 1,000 mcg by mouth daily 2/11/2019 at Unknown time Yes Unknown, Entered By History             Review of Systems:   A Comprehensive greater than 10 system review of systems was carried out.  Pertinent positives and negatives are noted above.  Otherwise negative for contributory information.           Physical Exam:   Blood pressure 123/67, pulse 70, temperature 98.6  F (37  C), resp. rate 16, SpO2 97 %.  Wt Readings from Last 1 Encounters:   No data found for Wt     Exam:  GENERAL: No apparent distress. Awake, alert, and fully oriented.  HEENT: Normocephalic, atraumatic. Extraocular movements intact.  CARDIOVASCULAR: Normal rate and irregularly irregular rhythm . No JVD, no pedal edema  PULMONARY: Clear to auscultation, no wheezes,  crackles  ABDOMINAL: Soft, non-tender, non-distended. Bowel sounds normoactive. No hepatosplenomegaly.  EXTREMITIES: No cyanosis or clubbing. No edema.  NEUROLOGICAL: CN 2-12 grossly intact, awake and alert x3, spontaneous and coherent speech. no focal neurological deficits.  DERMATOLOGICAL: No rash, ulcer, ecchymoses, jaundice.  Psych: not agitation, not combative, pleasant mood         Data:   EKG: A. fib, right bundle branch block, controlled rate  Review of old EKG narrative showed same findings except for rate    Imaging:  Recent Results (from the past 48 hour(s))   Abd/pelvis CT,  IV  contrast only TRAUMA / AAA    Narrative    CT ABDOMEN AND PELVIS WITH CONTRAST   2/11/2019 7:45 PM     HISTORY: Epigastric abdominal pain radiating to back.    TECHNIQUE:  CT abdomen and pelvis with 81 mL Isovue-370 IV. Radiation  dose for this scan was reduced using automated exposure control,  adjustment of the mA and/or kV according to patient size, or iterative  reconstruction technique.    COMPARISON: None.    FINDINGS:  Abdomen: The lung bases are unremarkable. The heart is enlarged. The  gallbladder is absent. The liver, spleen, pancreas, adrenal glands are  normal in appearance. There are several small low-attenuation cortical  lesions in the kidneys bilaterally which are likely cysts. There is  mild right renal atrophy when compared to the left. No hydronephrosis.  No abdominal or pelvic lymph node enlargement. There is  atherosclerotic calcification of aorta and its branches. No aneurysm.    Pelvis: There is no bowel obstruction or inflammation. No free  intraperitoneal gas or fluid. Uterus and adnexa appear grossly normal.  There are postoperative changes in the anterior abdominal wall.  Degenerative disease in the spine.      Impression    IMPRESSION:  1. No acute abnormality. No bowel obstruction or inflammation.  2. Cardiomegaly.    MEERA DAMIAN MD       Labs:  No results for input(s): CULT in the last 168  hours.  Recent Labs   Lab 02/11/19 1917   WBC 6.4   HGB 11.6*   HCT 37.5   MCV 87        Recent Labs   Lab 02/11/19 1917      POTASSIUM 2.7*   CHLORIDE 96   CO2 33*   ANIONGAP 9   *   BUN 14   CR 1.01   GFRESTIMATED 52*   GFRESTBLACK 60*   CONRADO 8.4*   PROTTOTAL 6.9   ALBUMIN 3.7   BILITOTAL 0.4   ALKPHOS 77   AST 18   ALT 15     No results for input(s): SED, CRP in the last 168 hours.  Recent Labs   Lab 02/11/19 1917   *     No results for input(s): INR in the last 168 hours.  Recent Labs   Lab 02/11/19 1917   LIPASE 39*     No results for input(s): TROPONIN, TROPI, TROPR in the last 168 hours.    Invalid input(s): TROP, TROPONINIES  No results for input(s): TSH in the last 168 hours.  No results for input(s): COLOR, APPEARANCE, URINEGLC, URINEBILI, URINEKETONE, SG, UBLD, URINEPH, PROTEIN, UROBILINOGEN, NITRITE, LEUKEST, RBCU, WBCU in the last 168 hours.

## 2019-02-12 NOTE — ED PROVIDER NOTES
History     Chief Complaint:  Abdominal pain    HPI   Rhonda Mathur is a 79 year old female with a history of diabetes and chronic heart failure, and previous cholecystectomy, who presents with her daughter to the emergency department with concern for abdominal pain. The patient reports that she has had epigastric abdominal pain and back pain for three months, which has begun to interfere with her sleep, prompting her to visit her primary care provider a week ago. On a recheck with her primary care provider today, she informed them that her prescribed omeprazole was not providing any relief, and so they instructed her to present here. She states she has had decreased appetite secondary to pain, and has begun to lose weight. The daughter notes that the patient has also been intermittently complaining of right flank pain and normal colored emesis as well. She denies any urinary or respiratory symptoms, or bowel movement abnormalities. She has been attempting to manage her symptoms with Oxycodone at home to no relief.     Allergies:  NKDA    Medications:    The patient is currently on no regular medications.    Past Medical History:    CHF  DM    Past Surgical History:    Hysterectomy  Hernia repair  Colectomy  Cholecystectomy    Family History:    No past pertinent family history.    Social History:  The patient denies tobacco or alcohol use.      Review of Systems   Constitutional: Negative for appetite change.   Respiratory: Negative for shortness of breath.    Gastrointestinal: Positive for abdominal pain and vomiting.   Genitourinary: Negative for dysuria, frequency, hematuria and urgency.   All other systems reviewed and are negative.    Physical Exam     Patient Vitals for the past 24 hrs:   BP Temp Pulse Resp SpO2   02/11/19 1739 115/75 98.6  F (37  C) 91 16 98 %         Physical Exam    Constitutional: Alert, attentive, GCS 15  HENT:    Nose: Nose normal.    Mouth/Throat: Oropharynx is clear, mucous  membranes are dry  Eyes: EOM are normal, anicteric, conjugate gaze  CV: regular rate and rhythm; no murmurs  Chest: Effort normal and breath sounds clear without wheezing or rales, symmetric bilaterally   GI:  Generalized epigastric abdominal pain. No rebound.   MSK: 2+ BLE edema.   Neurological: Alert, attentive, moving all extremities equally.   Skin: Skin is warm and dry.    Emergency Department Course   ECG:  Indication: epigastric abdominal pain, elderly  Time: 1843  Vent. Rate 71 bpm. ID interval *. QRS duration 156. QT/QTc 432/469. P-R-T axis * 128 -27.  Atrial fibrillation. RBBB. T wave abnormality, consider inferior ischemia. Abnormal ECG. No previous EKG. Read time: 1843    Imaging:  Radiographic findings were communicated with the patient, family and Admitting MD who voiced understanding of the findings.    CT Abdomen/Pelvis with IV contrast:   1. No acute abnormality. No bowel obstruction or inflammation.  2. Cardiomegaly. as per radiology.    Laboratory:    CBC: WBC: 6.4, HGB: 11.6, PLT: 245  CMP: Glucose 107, Potassium: 2.7, Carbon dioxide: 33, GFR estimate: 52, Calcium: 8.4, o/w WNL (Creatinine: 1.01)    Lipase: 39  1917 BNP: 5531    Interventions:    1919 Zofran 4 mg IV   NS 1L IV  2030 Potassium chloride 10 mEq IV   Potassium chloride 40 mEq oral  2133 Xylocaine 30 ml Oral    Medications   magnesium sulfate 2 g in NS intermittent infusion ([facility]ar[facility] or [facility] Cmpd) (not administered)   potassium chloride 10 mEq in 100 mL intermittent infusion with 10 mg lidocaine (10 mEq Intravenous New Bag 2/11/19 2030)   ondansetron (ZOFRAN) injection 4 mg (4 mg Intravenous Given 2/11/19 1919)   0.9% sodium chloride BOLUS (0 mLs Intravenous Stopped 2/1938)   iopamidol (ISOVUE-370) solution 500 mL (80 mLs Intravenous Given 2/11/19 1937)   potassium chloride (KLOR-CON) Packet 40 mEq (40 mEq Oral Given 2/11/19 2030)   lidocaine VISCOUS (XYLOCAINE) 2 % 15 mL, alum & mag hydroxide-simethicone (MYLANTA ES/MAALOX  ES)  15 mL GI Cocktail (30 mLs Oral Given 19)       Emergency Department Course:  Nursing notes and vitals reviewed. () I performed an exam of the patient as documented above.     IV inserted. Medicine administered as documented above. Blood drawn. This was sent to the lab for further testing, results above.    The patient was sent for a abdomen pelvis CT while in the emergency department, findings above.     () I rechecked the patient and discussed the results of her workup thus far.     ()  I consulted with Dr. Myers of the hospitalist services. They are in agreement to accept the patient for admission.    Findings and plan explained to the Patient who consents to admission. Discussed the patient with Dr. Myers, who will admit the patient to a inpatient bed for further monitoring, evaluation, and treatment.    Impression & Plan      Medical Decision Makin-year-old female past medical history significant for CHF with preserved EF, A. fib on Xarelto, diabetes presenting for evaluation of several weeks progressive epigastric abdominal pain in the setting of prior cholecystectomy, ventral hernia repair.  Vital signs within normal limits on arrival.    Physical exam, immediate concern was for possible partial bowel obstruction with lower suspicion for hollow viscus perforation given duration of her pain.  CT scan of the abdomen was obtained, this shows no acute findings.  Her lipase is within normal limits, LFTs are within normal limits.   her potassium is noted to be low at 2.7, this is despite being on home potassium replacement and this is likely secondary to Bumex use and poor p.o. intake with intermittent vomiting due to her abdominal pain.  Repletion was ordered along with magnesium.  Her BNP is also elevated into the 5000 since she has mild shortness of breath, but no infectious symptoms to suggest pneumonia.  Diuresis held given her hypokalemia and pending repletion.  Low suspicion  for atypical presentation of ACS, EKG shows A. fib with right bundle branch pattern.  Patient was admitted for potassium repletion and diuresis.    Diagnosis:    ICD-10-CM    1. Hypokalemia E87.6 Nt probnp inpatient     Nt probnp inpatient     CANCELED: BNP   2. Acute on chronic diastolic congestive heart failure (H) I50.33        Disposition:  Admitted to inpatient bed under care of Dr. Maday Osborne MD   [employer]  10:41 PM 02/11/19     Scribe Disclosure:  Kiran TURNER, am serving as a scribe on 2/11/2019 at 6:38 PM to personally document services performed by Tristan Osborne MD based on my observations and the provider's statements to me.     Kiran John  2/11/2019   [facility] EMERGENCY DEPARTMENT       Tristan Osborne MD  02/11/19 9052

## 2019-02-12 NOTE — PHARMACY-ADMISSION MEDICATION HISTORY
Admission medication history interview status for this patient is complete. See [facility] admission navigator for allergy information, prior to admission medications and immunization status.     Medication history interview source(s):Patient and Family  Medication history resources (including written lists, pill bottles, clinic record):med list  Primary pharmacy:[facility]    Changes made to PTA medication list:  Added: all listed  Deleted: -  Changed: -    Actions taken by pharmacist (provider contacted, etc):None     Additional medication history information:None    Medication reconciliation/reorder completed by provider prior to medication history? No    Do you take OTC medications (eg tylenol, ibuprofen, fish oil, eye/ear drops, etc)? yes(Y/N)    For patients on insulin therapy: no (Y/N)  Lantus/levemir/NPH/Mix 70/30 dose:   (Y/N) (see Med list for doses)   Sliding scale Novolog Y/N  If Yes, do you have a baseline novolog pre-meal dose:  units with meals  Patients eat three meals a day:   Y/N    How many episodes of hypoglycemia do you have per week: _______  How many missed doses do you have per week: ______  How many times do you check your blood glucose per day: _______  Do you have a Continuous glucose monitor (CGM)   Y/N (remind pt that not approved for hospital use)   Any Barriers to therapy - Be specific :  cost of medications, comfortable with giving injections (if applicable), comfortable and confident with current diabetes regimen: Y/N ______________      Prior to Admission medications    Medication Sig Last Dose Taking? Auth Provider   acetaminophen (TYLENOL) 325 MG tablet Take 325-650 mg by mouth every 4 hours as needed for mild pain  Yes Unknown, Entered By History   albuterol (PROAIR HFA/PROVENTIL HFA/VENTOLIN HFA) 108 (90 Base) MCG/ACT inhaler Inhale 2-4 puffs into the lungs every 4 hours as needed for shortness of breath / dyspnea or wheezing  Yes Unknown, Entered By History   amLODIPine (NORVASC)  10 MG tablet Take 10 mg by mouth daily 2/11/2019 at Unknown time Yes Unknown, Entered By History   apixaban ANTICOAGULANT (ELIQUIS) 5 MG tablet Take 5 mg by mouth 2 times daily 2/11/2019 at x1 Yes Unknown, Entered By History   aspirin 81 MG EC tablet Take 81 mg by mouth daily 2/11/2019 at Unknown time Yes Unknown, Entered By History   atorvastatin (LIPITOR) 20 MG tablet Take 20 mg by mouth daily 2/11/2019 at Unknown time Yes Unknown, Entered By History   bumetanide (BUMEX) 1 MG tablet Take 2 mg by mouth every morning 2/11/2019 at Unknown time Yes Unknown, Entered By History   bumetanide (BUMEX) 1 MG tablet Take 1 mg by mouth In afternoon 2/10/2019 at Unknown time Yes Unknown, Entered By History   carvedilol (COREG) 12.5 MG tablet Take 12.5 mg by mouth 2 times daily (with meals) 2/11/2019 at x1 Yes Unknown, Entered By History   gabapentin (NEURONTIN) 600 MG tablet Take 600 mg by mouth 2 times daily 2/11/2019 at x1 Yes Unknown, Entered By History   lisinopril (PRINIVIL/ZESTRIL) 5 MG tablet Take 5 mg by mouth daily 2/11/2019 at Unknown time Yes Unknown, Entered By History   metFORMIN (GLUCOPHAGE) 500 MG tablet Take 500 mg by mouth 2 times daily (with meals) 2/11/2019 at x1 Yes Unknown, Entered By History   nitroGLYcerin (NITROSTAT) 0.4 MG sublingual tablet Place 0.4 mg under the tongue every 5 minutes as needed for chest pain For chest pain place 1 tablet under the tongue every 5 minutes for 3 doses. If symptoms persist 5 minutes after 1st dose call 911.  Yes Unknown, Entered By History   omeprazole (PRILOSEC) 20 MG DR capsule Take 20 mg by mouth 2 times daily 2/11/2019 at x1 Yes Unknown, Entered By History   oxybutynin ER (DITROPAN XL) 15 MG 24 hr tablet Take 15 mg by mouth daily 2/11/2019 at Unknown time Yes Unknown, Entered By History   oxyCODONE-acetaminophen (PERCOCET) 5-325 MG tablet Take 1 tablet by mouth daily as needed for severe pain  Yes Unknown, Entered By History   potassium chloride ER (K-DUR/KLOR-CON M)  20 MEQ CR tablet Take 20 mEq by mouth daily 2/11/2019 at Unknown time Yes Unknown, Entered By History   vitamin B-12 (CYANOCOBALAMIN) 1000 MCG tablet Take 1,000 mcg by mouth daily 2/11/2019 at Unknown time Yes Unknown, Entered By History

## 2019-02-12 NOTE — PROGRESS NOTES
"[facility]  Hospitalist Progress Note  Rony Caicedo MD 02/12/2019    Reason for Stay (Diagnosis): Abdominal pain         Assessment and Plan:      Summary of Stay: Rhonda Mathur is a 79 year old female with multiple medical history such as chronic atrial fibrillation on oral anticoagulation, CAD, chronic heart failure with preserved ejection fraction, hypertension, diabetes mellitus non-insulin-requiring, dyslipidemia, prior anemia, currently living independently at home and presented in the emergency room mostly due to increasing episodes of abdominal discomfort and pain leading to decreased oral intake at least for the past 1-2 days.      Problem List:   1. Abdominal pain: Improved, continue pain medication as needed  2. Hypokalemia: Replace per protocol  3. CHF: Stable  4. Type 2 diabetes: Blood sugar controlled  5. Hypertension, controlled  6. Generalized weakness: PT OT and discharge planning  7. 2.1-second pause on telemetry: Patient asymptomatic, continue electrolyte replacement  DVT Prophylaxis: Pneumatic Compression Devices    Code Status: DNR / DNI  Discharge Dispo: Home  Estimated Disch Date / # of Days until Disch: Likely tomorrow if she remains stable on telemetry        Interval History (Subjective):      Patient was seen and examined by me this morning.  She she has weakness but denies any chest pain or significant shortness of breath.  No nausea vomiting or diarrhea.                  Physical Exam:      Last Vital Signs:  /48 (BP Location: Right arm)   Pulse 61   Temp 98.6  F (37  C) (Oral)   Resp 18   Ht 1.676 m (5' 6\")   Wt 72.5 kg (159 lb 12.8 oz)   SpO2 99%   BMI 25.79 kg/m        Intake/Output Summary (Last 24 hours) at 2/12/2019 1531  Last data filed at 2/12/2019 1500  Gross per 24 hour   Intake 400 ml   Output 800 ml   Net -400 ml       Constitutional: Awake, alert, cooperative, no apparent distress   Respiratory: Clear to auscultation bilaterally, no crackles " or wheezing   Cardiovascular: Regular rate and rhythm, normal S1 and S2, and no murmur noted   Abdomen: Normal bowel sounds, soft, non-distended, non-tender   Skin: No rashes, no cyanosis, dry to touch   Neuro: Alert and oriented x3, no weakness, numbness, memory loss   Extremities: No edema, normal range of motion   Other(s):        All other systems: Negative          Medications:      All current medications were reviewed with changes reflected in problem list.         Data:      All new lab and imaging data was reviewed.   Labs:  All laboratory and imaging data in the past 24 hours reviewed     Recent Labs   Lab 02/11/19 1917   WBC 6.4   HGB 11.6*   HCT 37.5   MCV 87        No results for input(s): CULT in the last 168 hours.  Recent Labs   Lab 02/12/19  0734 02/12/19  0111 02/11/19 1918 02/11/19 1917     --   --  138   POTASSIUM 3.8 3.0*  --  2.7*   CHLORIDE 101  --   --  96   CO2 31  --   --  33*   ANIONGAP 6  --   --  9   GLC 99  --   --  107*   BUN 12  --   --  14   CR 0.94  --   --  1.01   GFRESTIMATED 57*  --  48* 52*   GFRESTBLACK 66  --  58* 60*   CONRADO 8.5  --   --  8.4*   MAG  --  2.0  --   --    PROTTOTAL  --   --   --  6.9   ALBUMIN  --   --   --  3.7   BILITOTAL  --   --   --  0.4   ALKPHOS  --   --   --  77   AST  --   --   --  18   ALT  --   --   --  15       Recent Labs   Lab 02/12/19  1129 02/12/19  0817 02/12/19  0734 02/12/19  0110 02/11/19 1917   GLC  --   --  99  --  107*   * 107*  --  104*  --            No results for input(s): INR in the last 168 hours.        No results for input(s): TROPONIN, TROPI, TROPR in the last 168 hours.    Invalid input(s): TROP, TROPONINIES    Recent Results (from the past 48 hour(s))   Abd/pelvis CT,  IV  contrast only TRAUMA / AAA    Narrative    CT ABDOMEN AND PELVIS WITH CONTRAST   2/11/2019 7:45 PM     HISTORY: Epigastric abdominal pain radiating to back.    TECHNIQUE:  CT abdomen and pelvis with 81 mL Isovue-370 IV. Radiation  dose  for this scan was reduced using automated exposure control,  adjustment of the mA and/or kV according to patient size, or iterative  reconstruction technique.    COMPARISON: None.    FINDINGS:  Abdomen: The lung bases are unremarkable. The heart is enlarged. The  gallbladder is absent. The liver, spleen, pancreas, adrenal glands are  normal in appearance. There are several small low-attenuation cortical  lesions in the kidneys bilaterally which are likely cysts. There is  mild right renal atrophy when compared to the left. No hydronephrosis.  No abdominal or pelvic lymph node enlargement. There is  atherosclerotic calcification of aorta and its branches. No aneurysm.    Pelvis: There is no bowel obstruction or inflammation. No free  intraperitoneal gas or fluid. Uterus and adnexa appear grossly normal.  There are postoperative changes in the anterior abdominal wall.  Degenerative disease in the spine.      Impression    IMPRESSION:  1. No acute abnormality. No bowel obstruction or inflammation.  2. Cardiomegaly.    MEERA DAMIAN MD

## 2019-02-12 NOTE — PLAN OF CARE
0339 - MD paged - Pt had a 2.1 second pause with a HR noted at 38. Pt is asymptomatic, no sob, no chest pain.   0741 - MD paged - Pt had a 2.1 second pause this AM at 0646. No sob, chest pain, asymptomatic.     Pt up with 1 assist, walker, and gait belt. Pt is unsteady at times and encouraged patient to call staff for help. Bed alarm on. LS clear, BS active, tenderness noted in the upper abdomen. Passing flatus, LBM 2/11/19. CMS - neuropathy noted from bilateral knees down to feet. Skin intact. Voiding adequately. K and Mg protocols. K r/p and will be rechecked at 0730. DNR/DNI. Remote tele monitoring - see notes above.

## 2019-02-12 NOTE — PROGRESS NOTES
SPIRITUAL HEALTH SERVICES  SPIRITUAL ASSESSMENT Progress Note  [facility] Ortho 6th foor    PRIMARY FOCUS:     Emotional/spiritual/Uatsdin distress    Support for coping    ILLNESS CIRCUMSTANCES:   Reviewed documentation. Reflective conversation shared with pt, Rhonda, which integrated elements of illness and family narratives.     Context of Serious Illness/Symptom(s) - Rhonda notes she developed abdominal and LBP and has been found to have CHF as well as a SBO.     Resources for Support - Rhonda names her dtr, Fawn, who lives locally as well as a son who lives in [place].     DISTRESS:     Emotional/Existential/Relational Distress - Rhonda shares that her , Castillo, has dementia and they live in [facility], he in memory care and her in her own apt. She shares how difficult this has been and the toll it takes on her.     Spiritual/Restorationist Distress - None expressed.     Social/Cultural/Economic Distress - As above, Rhonda finds it challenging to break away from being with Castillo during the day to care for herself.     SPIRITUAL/Anabaptist COPING:     Mormonism/Nevin - Uatsdin and attends services at [facility] on Tuesday mornings.     Spiritual Practice(s) - Prayer, Bible study.    Emotional/Existential/Relational Connections - Rhonda close to her dtr and . She also seeks to find ways to help others feel supported and coordinated a tree decorating project for people in her apt complex.   GOALS OF CARE:    Goals of Care - Restorative.     Meaning/Sense-Making - Rhonda engaged in life review reflecting on her marriage, how that has informed her walking alongside her , Castillo, in his illness.     PLAN: Will f/u later in the week if she remains hospitalized. SH remains available per pt/family/staff need or request.     Castillo Noe M.Div.  Staff   Pager [phone]

## 2019-02-13 LAB
GLUCOSE BLDC GLUCOMTR-MCNC: 109 MG/DL (ref 70–99)
GLUCOSE BLDC GLUCOMTR-MCNC: 111 MG/DL (ref 70–99)
GLUCOSE BLDC GLUCOMTR-MCNC: 112 MG/DL (ref 70–99)
GLUCOSE BLDC GLUCOMTR-MCNC: 115 MG/DL (ref 70–99)
GLUCOSE BLDC GLUCOMTR-MCNC: 142 MG/DL (ref 70–99)
HGB BLD-MCNC: 12.6 G/DL (ref 11.7–15.7)

## 2019-02-13 PROCEDURE — 25000125 ZZHC RX 250: Performed by: INTERNAL MEDICINE

## 2019-02-13 PROCEDURE — 12000000 ZZH R&B MED SURG/OB

## 2019-02-13 PROCEDURE — 25000132 ZZH RX MED GY IP 250 OP 250 PS 637: Mod: GY | Performed by: INTERNAL MEDICINE

## 2019-02-13 PROCEDURE — A9270 NON-COVERED ITEM OR SERVICE: HCPCS | Mod: GY | Performed by: INTERNAL MEDICINE

## 2019-02-13 PROCEDURE — 99232 SBSQ HOSP IP/OBS MODERATE 35: CPT | Performed by: INTERNAL MEDICINE

## 2019-02-13 PROCEDURE — 36415 COLL VENOUS BLD VENIPUNCTURE: CPT | Performed by: INTERNAL MEDICINE

## 2019-02-13 PROCEDURE — 00000146 ZZHCL STATISTIC GLUCOSE BY METER IP

## 2019-02-13 PROCEDURE — 85018 HEMOGLOBIN: CPT | Performed by: INTERNAL MEDICINE

## 2019-02-13 RX ORDER — SUCRALFATE ORAL 1 G/10ML
1 SUSPENSION ORAL
Status: DISCONTINUED | OUTPATIENT
Start: 2019-02-13 | End: 2019-02-18

## 2019-02-13 RX ORDER — LIDOCAINE 40 MG/G
CREAM TOPICAL
Status: CANCELLED | OUTPATIENT
Start: 2019-02-13

## 2019-02-13 RX ORDER — IBUPROFEN 400 MG/1
400 TABLET, FILM COATED ORAL EVERY 6 HOURS PRN
Status: DISCONTINUED | OUTPATIENT
Start: 2019-02-13 | End: 2019-02-20

## 2019-02-13 RX ORDER — OXYCODONE AND ACETAMINOPHEN 5; 325 MG/1; MG/1
1 TABLET ORAL
Status: COMPLETED | OUTPATIENT
Start: 2019-02-13 | End: 2019-02-13

## 2019-02-13 RX ORDER — PANTOPRAZOLE SODIUM 40 MG/1
40 TABLET, DELAYED RELEASE ORAL
Status: DISCONTINUED | OUTPATIENT
Start: 2019-02-13 | End: 2019-02-15

## 2019-02-13 RX ADMIN — ASPIRIN 81 MG: 81 TABLET, COATED ORAL at 08:27

## 2019-02-13 RX ADMIN — SUCRALFATE 1 G: 1 SUSPENSION ORAL at 14:19

## 2019-02-13 RX ADMIN — PANTOPRAZOLE SODIUM 40 MG: 40 TABLET, DELAYED RELEASE ORAL at 17:16

## 2019-02-13 RX ADMIN — ACETAMINOPHEN 650 MG: 325 TABLET, FILM COATED ORAL at 12:58

## 2019-02-13 RX ADMIN — LISINOPRIL 5 MG: 5 TABLET ORAL at 08:30

## 2019-02-13 RX ADMIN — ACETAMINOPHEN 650 MG: 325 TABLET, FILM COATED ORAL at 19:05

## 2019-02-13 RX ADMIN — METFORMIN HYDROCHLORIDE 500 MG: 500 TABLET ORAL at 08:27

## 2019-02-13 RX ADMIN — APIXABAN 5 MG: 5 TABLET, FILM COATED ORAL at 08:27

## 2019-02-13 RX ADMIN — BUMETANIDE 2 MG: 2 TABLET ORAL at 08:27

## 2019-02-13 RX ADMIN — LIDOCAINE HYDROCHLORIDE 30 ML: 20 SOLUTION ORAL; TOPICAL at 14:19

## 2019-02-13 RX ADMIN — CARVEDILOL 12.5 MG: 12.5 TABLET, FILM COATED ORAL at 08:30

## 2019-02-13 RX ADMIN — SUCRALFATE 1 G: 1 SUSPENSION ORAL at 17:16

## 2019-02-13 RX ADMIN — POTASSIUM CHLORIDE 20 MEQ: 1500 TABLET, EXTENDED RELEASE ORAL at 08:27

## 2019-02-13 RX ADMIN — ATORVASTATIN CALCIUM 20 MG: 20 TABLET, FILM COATED ORAL at 08:27

## 2019-02-13 RX ADMIN — OXYBUTYNIN CHLORIDE 15 MG: 5 TABLET, EXTENDED RELEASE ORAL at 08:27

## 2019-02-13 RX ADMIN — METFORMIN HYDROCHLORIDE 500 MG: 500 TABLET ORAL at 17:17

## 2019-02-13 RX ADMIN — OXYCODONE HYDROCHLORIDE AND ACETAMINOPHEN 1 TABLET: 5; 325 TABLET ORAL at 05:56

## 2019-02-13 RX ADMIN — GABAPENTIN 600 MG: 600 TABLET, FILM COATED ORAL at 21:30

## 2019-02-13 RX ADMIN — CYANOCOBALAMIN TAB 1000 MCG 1000 MCG: 1000 TAB at 08:27

## 2019-02-13 RX ADMIN — FAMOTIDINE 20 MG: 20 TABLET ORAL at 21:31

## 2019-02-13 RX ADMIN — IBUPROFEN 400 MG: 400 TABLET ORAL at 23:51

## 2019-02-13 RX ADMIN — BUMETANIDE 1 MG: 0.5 TABLET ORAL at 17:17

## 2019-02-13 RX ADMIN — OXYCODONE HYDROCHLORIDE AND ACETAMINOPHEN 1 TABLET: 5; 325 TABLET ORAL at 21:31

## 2019-02-13 RX ADMIN — AMLODIPINE BESYLATE 10 MG: 10 TABLET ORAL at 08:30

## 2019-02-13 RX ADMIN — GABAPENTIN 600 MG: 600 TABLET, FILM COATED ORAL at 08:27

## 2019-02-13 RX ADMIN — FAMOTIDINE 20 MG: 20 TABLET ORAL at 08:27

## 2019-02-13 RX ADMIN — ACETAMINOPHEN 650 MG: 325 TABLET, FILM COATED ORAL at 01:53

## 2019-02-13 RX ADMIN — ACETAMINOPHEN 650 MG: 325 TABLET, FILM COATED ORAL at 08:32

## 2019-02-13 ASSESSMENT — ACTIVITIES OF DAILY LIVING (ADL)
ADLS_ACUITY_SCORE: 16

## 2019-02-13 ASSESSMENT — MIFFLIN-ST. JEOR: SCORE: 1199.34

## 2019-02-13 NOTE — PROGRESS NOTES
Patient was seen and examined by me this morning.  Family at bedside.  She is complaining of epigastric abdominal pain which is persistent associated with eating.  She is concerned that she has peptic ulcer disease which she had in the past.  She denies any dizziness lightheadedness or change in her stool.  Telemetry events noted for frequent symptomatic pauses.  Hemoglobin is stable.  Will get a GI team to see patient for further evaluation recommendation if in case she needs EGD for further evaluation.  I will start her on GI cocktail as needed, PPI twice daily and closely monitor hemoglobin and symptoms.

## 2019-02-13 NOTE — CONSULTS
Gastroenterology Consultation      Rhonda Mathur MRN# [id number]   YOB: 1940 Age: 79 year old   Date of Admission: 2/11/2019     Reason for consult: I was asked by Dr Caicedo to evaluate this patient for epigastric pain.               History of Present Illness:   This patient is a 79 year old female who presents with epigastric pain.  She has a history of ulcers in the past (per her report), and more recently had gastritis found as part of an evaluation for anemia.  She has been on H2 blockers or PPIs for some time.  A few months ago she began having epigastric pain that has awakened her about 2-3 AM and been worse with any po intake.  She was switched from ranitidine to BID omeprazole recently without improvement.  She does not remember any new medications.  She is on anticoagulation for A fib, but has not noted bleeding.  She has no dysphagia.  Bowel movements are regular.  CT of the abdomen on admission was unremarkable.  She has lost a little weight recently, she believes because she is not eating much because of pain.  The symptoms now are similar to those in the distant past.  She is now on famotidine and pantoprazole without improvement.              Past Medical History:   History reviewed. No pertinent past medical history.          Past Surgical History:   No past surgical history on file.            Social History:     Social History     Socioeconomic History     Marital status:      Spouse name: Not on file     Number of children: Not on file     Years of education: Not on file     Highest education level: Not on file   Social Needs     Financial resource strain: Not on file     Food insecurity - worry: Not on file     Food insecurity - inability: Not on file     Transportation needs - medical: Not on file     Transportation needs - non-medical: Not on file   Occupational History     Not on file   Tobacco Use     Smoking status: Not on file   Substance and Sexual Activity      "Alcohol use: Not on file     Drug use: Not on file     Sexual activity: Not on file   Other Topics Concern     Not on file   Social History Narrative     Not on file             Family History:   No family history on file.          Allergies:    No Known Allergies          Medications:     No current outpatient medications on file.             Review of Systems:   10-point review of systems negative except as noted in HPI.            Physical Exam:   Vitals were reviewed  /58 (BP Location: Right arm)   Pulse 69   Temp 97.7  F (36.5  C) (Oral)   Resp 16   Ht 1.676 m (5' 6\")   Wt 71.8 kg (158 lb 3.2 oz)   SpO2 93%   BMI 25.53 kg/m    Constitutional:   No distress, gets SOB when moving from a lying to sitting position     Heent:   NC/AT, sclera anicteric     Neck:   No adenopathy, thyroid not palpable   Respiratory:   CTA anteriorly     Cardiovascular:   IRR, no murmur     Gastrointestinal:   Active BS, soft, tender epigatrium     Extremities:   No clubbing, cyanosis or edema   Neuro:   Grossly nonfocal     Skin:   No rashes or ecchymoses   Psychiatry:        No evidence of anxiety or depression         Data:     ROUTINE IP LABS  BMP  Last Comprehensive Metabolic Panel:  Sodium   Date Value Ref Range Status   02/12/2019 138 133 - 144 mmol/L Final     Potassium   Date Value Ref Range Status   02/12/2019 3.8 3.4 - 5.3 mmol/L Final     Chloride   Date Value Ref Range Status   02/12/2019 101 94 - 109 mmol/L Final     Carbon Dioxide   Date Value Ref Range Status   02/12/2019 31 20 - 32 mmol/L Final     Anion Gap   Date Value Ref Range Status   02/12/2019 6 3 - 14 mmol/L Final     Glucose   Date Value Ref Range Status   02/12/2019 99 70 - 99 mg/dL Final     Urea Nitrogen   Date Value Ref Range Status   02/12/2019 12 7 - 30 mg/dL Final     Creatinine   Date Value Ref Range Status   02/12/2019 0.94 0.52 - 1.04 mg/dL Final     GFR Estimate   Date Value Ref Range Status   02/12/2019 57 (L) >60 mL/min/[1.73_m2] Final "     Comment:     Non  GFR Calc  Starting 12/18/2018, serum creatinine based estimated GFR (eGFR) will be   calculated using the Chronic Kidney Disease Epidemiology Collaboration   (CKD-EPI) equation.       Calcium   Date Value Ref Range Status   02/12/2019 8.5 8.5 - 10.1 mg/dL Final       CBC  Lab Results   Component Value Date    WBC 6.4 02/11/2019     Lab Results   Component Value Date    RBC 4.31 02/11/2019     Lab Results   Component Value Date    HGB 12.6 02/13/2019     Lab Results   Component Value Date    HCT 37.5 02/11/2019     No components found for: MCT  Lab Results   Component Value Date    MCV 87 02/11/2019     Lab Results   Component Value Date    MCH 26.9 02/11/2019     Lab Results   Component Value Date    MCHC 30.9 02/11/2019     Lab Results   Component Value Date    RDW 15.3 02/11/2019     Lab Results   Component Value Date     02/11/2019       INR  No results found for: INR    PANCREAS  Recent Labs   Lab 02/11/19 1917   LIPASE 39*     LFT  Recent Labs   Lab 02/11/19 1917   PROTTOTAL 6.9   ALBUMIN 3.7   BILITOTAL 0.4   ALKPHOS 77   AST 18   ALT 15                Assessment and Plan:   I believe her symptoms are GI in origin given location, worsening with food and with peak acid production in the middle of the night.  I cannot explain why antacid therapy is not helping.  I would like to add sucralfate and arrange for an EGD tomorrow with MAC anesthesia.     James Tran MD  [facility]  Office:  [phone]   Signing off - call with questions…

## 2019-02-13 NOTE — PROGRESS NOTES
Plans for EGD tomorrow afternoon. Per endoscopy, pt should receive AM medications tomorrow no later than 0700. Hold AM Metformin. NPO after midnight. RN will page hospitalist regarding if pt should take Eliquis and Aspirin this evening and in AM.

## 2019-02-13 NOTE — PLAN OF CARE
Alert and oriented. Tolerating soft diet, pt notes increased pain with eating. Sucralfate, Maalox, and Protonix initiated this shift. Transfers with SBA, gait belt, and walker. Voiding in adequate amounts. Pain managed with prn Tylenol. Monitoring BG levels. Plans for EGD tomorrow, GI following.

## 2019-02-13 NOTE — PROGRESS NOTES
[facility]  Hospitalist Progress Note  Rony Caicedo MD 02/13/2019    Reason for Stay (Diagnosis): Abdominal pain         Assessment and Plan:      Summary of Stay: Rhonda Mathur is a 79 year old female with multiple medical history such as chronic atrial fibrillation on oral anticoagulation, CAD, chronic heart failure with preserved ejection fraction, hypertension, diabetes mellitus non-insulin-requiring, dyslipidemia, prior anemia, currently living independently at home and presented in the emergency room mostly due to increasing episodes of abdominal discomfort and pain leading to decreased oral intake at least for the past 1-2 days.      Problem List:   1. Abdominal pain: Epigastric abdominal pain persisted and patient is concerned about peptic ulcer disease she had in the past.  Family at bedside and need for further evaluation discussed.  GI was consulted to assist with further management.  2. Hypokalemia: Replace per protocol  3. CHF: Stable  4. Type 2 diabetes: Blood sugar controlled  5. Hypertension, controlled  6. Generalized weakness: PT OT and discharge planning  7. 2.1-second pause on telemetry: Patient asymptomatic, continue electrolyte replacement  DVT Prophylaxis: Pneumatic Compression Devices    Code Status: DNR / DNI  Discharge Dispo: Home  Estimated Disch Date / # of Days until Disch: Likely in the next 1 or 2 days based on endoscopy results.        Interval History (Subjective):      Patient was seen and examined by me this morning.  She is complaining of epigastric abdominal pain which is persisting.  His burning and associated with eating as well.  Family at bedside and concerned about possibility of peptic ulcer disease with patient was diagnosed with in the past.  No indication of nausea vomiting or bloody stool.  No discoloration of stool either.         Physical Exam:      Last Vital Signs:  /67 (BP Location: Right arm)   Pulse 76   Temp 96.1  F (35.6  C) (Oral)   " Resp 20   Ht 1.676 m (5' 6\")   Wt 71.8 kg (158 lb 3.2 oz)   SpO2 97%   BMI 25.53 kg/m        Intake/Output Summary (Last 24 hours) at 2/12/2019 1531  Last data filed at 2/12/2019 1500  Gross per 24 hour   Intake 400 ml   Output 800 ml   Net -400 ml       Constitutional: Awake, alert, cooperative, no apparent distress   Respiratory: Clear to auscultation bilaterally, no crackles or wheezing   Cardiovascular: Regular rate and rhythm, normal S1 and S2, and no murmur noted   Abdomen: Normal bowel sounds, soft, non-distended, non-tender   Skin: No rashes, no cyanosis, dry to touch   Neuro: Alert and oriented x3, no weakness, numbness, memory loss   Extremities: No edema, normal range of motion   Other(s):        All other systems: Negative          Medications:      All current medications were reviewed with changes reflected in problem list.         Data:      All new lab and imaging data was reviewed.   Labs:  All laboratory and imaging data in the past 24 hours reviewed     Recent Labs   Lab 02/13/19  1304 02/11/19 1917   WBC  --  6.4   HGB 12.6 11.6*   HCT  --  37.5   MCV  --  87   PLT  --  245     No results for input(s): CULT in the last 168 hours.  Recent Labs   Lab 02/12/19  0734 02/12/19  0111 02/11/19 1918 02/11/19 1917     --   --  138   POTASSIUM 3.8 3.0*  --  2.7*   CHLORIDE 101  --   --  96   CO2 31  --   --  33*   ANIONGAP 6  --   --  9   GLC 99  --   --  107*   BUN 12  --   --  14   CR 0.94  --   --  1.01   GFRESTIMATED 57*  --  48* 52*   GFRESTBLACK 66  --  58* 60*   CONRADO 8.5  --   --  8.4*   MAG  --  2.0  --   --    PROTTOTAL  --   --   --  6.9   ALBUMIN  --   --   --  3.7   BILITOTAL  --   --   --  0.4   ALKPHOS  --   --   --  77   AST  --   --   --  18   ALT  --   --   --  15       Recent Labs   Lab 02/13/19  1256 02/13/19  0756 02/13/19  0158 02/12/19 2057 02/12/19  1655  02/12/19  0734  02/11/19 1917   GLC  --   --   --   --   --   --  99  --  107*   * 115* 111* 108* 123*   < " >  --    < >  --     < > = values in this interval not displayed.           No results for input(s): INR in the last 168 hours.        No results for input(s): TROPONIN, TROPI, TROPR in the last 168 hours.    Invalid input(s): TROP, TROPONINIES    Recent Results (from the past 48 hour(s))   Abd/pelvis CT,  IV  contrast only TRAUMA / AAA    Narrative    CT ABDOMEN AND PELVIS WITH CONTRAST   2/11/2019 7:45 PM     HISTORY: Epigastric abdominal pain radiating to back.    TECHNIQUE:  CT abdomen and pelvis with 81 mL Isovue-370 IV. Radiation  dose for this scan was reduced using automated exposure control,  adjustment of the mA and/or kV according to patient size, or iterative  reconstruction technique.    COMPARISON: None.    FINDINGS:  Abdomen: The lung bases are unremarkable. The heart is enlarged. The  gallbladder is absent. The liver, spleen, pancreas, adrenal glands are  normal in appearance. There are several small low-attenuation cortical  lesions in the kidneys bilaterally which are likely cysts. There is  mild right renal atrophy when compared to the left. No hydronephrosis.  No abdominal or pelvic lymph node enlargement. There is  atherosclerotic calcification of aorta and its branches. No aneurysm.    Pelvis: There is no bowel obstruction or inflammation. No free  intraperitoneal gas or fluid. Uterus and adnexa appear grossly normal.  There are postoperative changes in the anterior abdominal wall.  Degenerative disease in the spine.      Impression    IMPRESSION:  1. No acute abnormality. No bowel obstruction or inflammation.  2. Cardiomegaly.    MEERA DAMIAN MD

## 2019-02-13 NOTE — PLAN OF CARE
Vital signs stable.  Lungs clear, sats on room air 89-90% on O2 at 2 lpm nasal cannula, sats 95%.  Up with sba of 1.  Pain in abdominal area treated with tylenol with relief.Remote tele .  Blood glucose monitoring.Plan of care reviewed with pt.

## 2019-02-13 NOTE — PLAN OF CARE
7676 - MD paged - Since 9179 - 6770 pt has had 9 pauses(ranges from 2.0-2.2). No sob or chest pain. Upper abdominal pain is 9/10 but is able to fall asleep in between cares.    Pt up with 1 assist, walker, and gait belt. LS clear but needing 2LPM of O2 at night. BS active, tenderness noted in the upper abdomen - Tylenol and Percocet given. Passing flatus, LBM 2/11/19. CMS - neuropathy noted from bilateral knees down to feet. Skin intact. Voiding adequately. K and Mg protocols. DNR/DNI. Remote tele monitoring - see notes above.     Pt tearful this morning as she wish the doctors could figure out what is wrong. Pt told this RN that her  lives in the memory care unit and with her not being there is hard for him. Spiritual health has been following.

## 2019-02-14 ENCOUNTER — ANESTHESIA EVENT (OUTPATIENT)
Dept: SURGERY | Facility: CLINIC | Age: 79
DRG: 383 | End: 2019-02-14
Payer: MEDICARE

## 2019-02-14 ENCOUNTER — ANESTHESIA (OUTPATIENT)
Dept: SURGERY | Facility: CLINIC | Age: 79
DRG: 383 | End: 2019-02-14
Payer: MEDICARE

## 2019-02-14 LAB
ANION GAP SERPL CALCULATED.3IONS-SCNC: 5 MMOL/L (ref 3–14)
BUN SERPL-MCNC: 16 MG/DL (ref 7–30)
CALCIUM SERPL-MCNC: 8.7 MG/DL (ref 8.5–10.1)
CHLORIDE SERPL-SCNC: 99 MMOL/L (ref 94–109)
CO2 SERPL-SCNC: 33 MMOL/L (ref 20–32)
CREAT SERPL-MCNC: 1.15 MG/DL (ref 0.52–1.04)
ERYTHROCYTE [DISTWIDTH] IN BLOOD BY AUTOMATED COUNT: 15.6 % (ref 10–15)
GFR SERPL CREATININE-BSD FRML MDRD: 45 ML/MIN/{1.73_M2}
GLUCOSE BLDC GLUCOMTR-MCNC: 103 MG/DL (ref 70–99)
GLUCOSE BLDC GLUCOMTR-MCNC: 104 MG/DL (ref 70–99)
GLUCOSE BLDC GLUCOMTR-MCNC: 76 MG/DL (ref 70–99)
GLUCOSE BLDC GLUCOMTR-MCNC: 90 MG/DL (ref 70–99)
GLUCOSE BLDC GLUCOMTR-MCNC: 93 MG/DL (ref 70–99)
GLUCOSE SERPL-MCNC: 90 MG/DL (ref 70–99)
HCT VFR BLD AUTO: 38.5 % (ref 35–47)
HGB BLD-MCNC: 11.7 G/DL (ref 11.7–15.7)
MAGNESIUM SERPL-MCNC: 1.6 MG/DL (ref 1.6–2.3)
MCH RBC QN AUTO: 26.4 PG (ref 26.5–33)
MCHC RBC AUTO-ENTMCNC: 30.4 G/DL (ref 31.5–36.5)
MCV RBC AUTO: 87 FL (ref 78–100)
PLATELET # BLD AUTO: 237 10E9/L (ref 150–450)
POTASSIUM SERPL-SCNC: 3.6 MMOL/L (ref 3.4–5.3)
RBC # BLD AUTO: 4.44 10E12/L (ref 3.8–5.2)
SODIUM SERPL-SCNC: 137 MMOL/L (ref 133–144)
UPPER GI ENDOSCOPY: NORMAL
WBC # BLD AUTO: 5.9 10E9/L (ref 4–11)

## 2019-02-14 PROCEDURE — 83735 ASSAY OF MAGNESIUM: CPT | Performed by: INTERNAL MEDICINE

## 2019-02-14 PROCEDURE — A9270 NON-COVERED ITEM OR SERVICE: HCPCS | Mod: GY | Performed by: INTERNAL MEDICINE

## 2019-02-14 PROCEDURE — 25000132 ZZH RX MED GY IP 250 OP 250 PS 637: Mod: GY | Performed by: INTERNAL MEDICINE

## 2019-02-14 PROCEDURE — 00000146 ZZHCL STATISTIC GLUCOSE BY METER IP

## 2019-02-14 PROCEDURE — 0DB98ZX EXCISION OF DUODENUM, VIA NATURAL OR ARTIFICIAL OPENING ENDOSCOPIC, DIAGNOSTIC: ICD-10-PCS | Performed by: INTERNAL MEDICINE

## 2019-02-14 PROCEDURE — 88305 TISSUE EXAM BY PATHOLOGIST: CPT | Performed by: INTERNAL MEDICINE

## 2019-02-14 PROCEDURE — 71000027 ZZH RECOVERY PHASE 2 EACH 15 MINS: Performed by: INTERNAL MEDICINE

## 2019-02-14 PROCEDURE — 88305 TISSUE EXAM BY PATHOLOGIST: CPT | Mod: 26 | Performed by: INTERNAL MEDICINE

## 2019-02-14 PROCEDURE — 88342 IMHCHEM/IMCYTCHM 1ST ANTB: CPT | Performed by: INTERNAL MEDICINE

## 2019-02-14 PROCEDURE — 25000128 H RX IP 250 OP 636: Performed by: NURSE ANESTHETIST, CERTIFIED REGISTERED

## 2019-02-14 PROCEDURE — 37000008 ZZH ANESTHESIA TECHNICAL FEE, 1ST 30 MIN: Performed by: INTERNAL MEDICINE

## 2019-02-14 PROCEDURE — 88342 IMHCHEM/IMCYTCHM 1ST ANTB: CPT | Mod: 26 | Performed by: INTERNAL MEDICINE

## 2019-02-14 PROCEDURE — 25800030 ZZH RX IP 258 OP 636: Performed by: ANESTHESIOLOGY

## 2019-02-14 PROCEDURE — 0DB78ZX EXCISION OF STOMACH, PYLORUS, VIA NATURAL OR ARTIFICIAL OPENING ENDOSCOPIC, DIAGNOSTIC: ICD-10-PCS | Performed by: INTERNAL MEDICINE

## 2019-02-14 PROCEDURE — 36415 COLL VENOUS BLD VENIPUNCTURE: CPT | Performed by: INTERNAL MEDICINE

## 2019-02-14 PROCEDURE — 25000125 ZZHC RX 250: Performed by: NURSE ANESTHETIST, CERTIFIED REGISTERED

## 2019-02-14 PROCEDURE — 40000063 ZZH STATISTIC EGD (OR PROCEDURE): Performed by: INTERNAL MEDICINE

## 2019-02-14 PROCEDURE — 36000050 ZZH SURGERY LEVEL 2 1ST 30 MIN: Performed by: INTERNAL MEDICINE

## 2019-02-14 PROCEDURE — 37000009 ZZH ANESTHESIA TECHNICAL FEE, EACH ADDTL 15 MIN: Performed by: INTERNAL MEDICINE

## 2019-02-14 PROCEDURE — 99221 1ST HOSP IP/OBS SF/LOW 40: CPT | Performed by: INTERNAL MEDICINE

## 2019-02-14 PROCEDURE — 0DB68ZX EXCISION OF STOMACH, VIA NATURAL OR ARTIFICIAL OPENING ENDOSCOPIC, DIAGNOSTIC: ICD-10-PCS | Performed by: INTERNAL MEDICINE

## 2019-02-14 PROCEDURE — 40000306 ZZH STATISTIC PRE PROC ASSESS II: Performed by: INTERNAL MEDICINE

## 2019-02-14 PROCEDURE — 80048 BASIC METABOLIC PNL TOTAL CA: CPT | Performed by: INTERNAL MEDICINE

## 2019-02-14 PROCEDURE — 12000000 ZZH R&B MED SURG/OB

## 2019-02-14 PROCEDURE — 99232 SBSQ HOSP IP/OBS MODERATE 35: CPT | Performed by: INTERNAL MEDICINE

## 2019-02-14 PROCEDURE — 85027 COMPLETE CBC AUTOMATED: CPT | Performed by: INTERNAL MEDICINE

## 2019-02-14 PROCEDURE — 27210794 ZZH OR GENERAL SUPPLY STERILE: Performed by: INTERNAL MEDICINE

## 2019-02-14 RX ORDER — LIDOCAINE 40 MG/G
CREAM TOPICAL
Status: DISCONTINUED | OUTPATIENT
Start: 2019-02-14 | End: 2019-02-14 | Stop reason: HOSPADM

## 2019-02-14 RX ORDER — NALOXONE HYDROCHLORIDE 0.4 MG/ML
.1-.4 INJECTION, SOLUTION INTRAMUSCULAR; INTRAVENOUS; SUBCUTANEOUS
Status: DISCONTINUED | OUTPATIENT
Start: 2019-02-14 | End: 2019-02-14 | Stop reason: HOSPADM

## 2019-02-14 RX ORDER — NALOXONE HYDROCHLORIDE 0.4 MG/ML
.1-.4 INJECTION, SOLUTION INTRAMUSCULAR; INTRAVENOUS; SUBCUTANEOUS
Status: ACTIVE | OUTPATIENT
Start: 2019-02-14 | End: 2019-02-15

## 2019-02-14 RX ORDER — FAMOTIDINE 20 MG/1
20 TABLET, FILM COATED ORAL DAILY
Status: DISCONTINUED | OUTPATIENT
Start: 2019-02-15 | End: 2019-02-20

## 2019-02-14 RX ORDER — FENTANYL CITRATE 50 UG/ML
INJECTION, SOLUTION INTRAMUSCULAR; INTRAVENOUS PRN
Status: DISCONTINUED | OUTPATIENT
Start: 2019-02-14 | End: 2019-02-14

## 2019-02-14 RX ORDER — ONDANSETRON 2 MG/ML
INJECTION INTRAMUSCULAR; INTRAVENOUS PRN
Status: DISCONTINUED | OUTPATIENT
Start: 2019-02-14 | End: 2019-02-14

## 2019-02-14 RX ORDER — MEPERIDINE HYDROCHLORIDE 25 MG/ML
12.5 INJECTION INTRAMUSCULAR; INTRAVENOUS; SUBCUTANEOUS
Status: DISCONTINUED | OUTPATIENT
Start: 2019-02-14 | End: 2019-02-14 | Stop reason: HOSPADM

## 2019-02-14 RX ORDER — FENTANYL CITRATE 50 UG/ML
25-50 INJECTION, SOLUTION INTRAMUSCULAR; INTRAVENOUS
Status: DISCONTINUED | OUTPATIENT
Start: 2019-02-14 | End: 2019-02-14 | Stop reason: HOSPADM

## 2019-02-14 RX ORDER — ALBUTEROL SULFATE 0.83 MG/ML
2.5 SOLUTION RESPIRATORY (INHALATION) EVERY 4 HOURS PRN
Status: DISCONTINUED | OUTPATIENT
Start: 2019-02-14 | End: 2019-02-14 | Stop reason: HOSPADM

## 2019-02-14 RX ORDER — ONDANSETRON 2 MG/ML
4 INJECTION INTRAMUSCULAR; INTRAVENOUS EVERY 30 MIN PRN
Status: DISCONTINUED | OUTPATIENT
Start: 2019-02-14 | End: 2019-02-14 | Stop reason: HOSPADM

## 2019-02-14 RX ORDER — PROPOFOL 10 MG/ML
INJECTION, EMULSION INTRAVENOUS CONTINUOUS PRN
Status: DISCONTINUED | OUTPATIENT
Start: 2019-02-14 | End: 2019-02-14

## 2019-02-14 RX ORDER — SODIUM CHLORIDE, SODIUM LACTATE, POTASSIUM CHLORIDE, CALCIUM CHLORIDE 600; 310; 30; 20 MG/100ML; MG/100ML; MG/100ML; MG/100ML
INJECTION, SOLUTION INTRAVENOUS CONTINUOUS
Status: DISCONTINUED | OUTPATIENT
Start: 2019-02-14 | End: 2019-02-14 | Stop reason: HOSPADM

## 2019-02-14 RX ORDER — FLUMAZENIL 0.1 MG/ML
0.2 INJECTION, SOLUTION INTRAVENOUS
Status: ACTIVE | OUTPATIENT
Start: 2019-02-14 | End: 2019-02-15

## 2019-02-14 RX ORDER — CARVEDILOL 6.25 MG/1
6.25 TABLET ORAL 2 TIMES DAILY WITH MEALS
Status: DISCONTINUED | OUTPATIENT
Start: 2019-02-14 | End: 2019-02-18

## 2019-02-14 RX ORDER — ONDANSETRON 4 MG/1
4 TABLET, ORALLY DISINTEGRATING ORAL EVERY 30 MIN PRN
Status: DISCONTINUED | OUTPATIENT
Start: 2019-02-14 | End: 2019-02-14 | Stop reason: HOSPADM

## 2019-02-14 RX ORDER — DIMENHYDRINATE 50 MG/ML
25 INJECTION, SOLUTION INTRAMUSCULAR; INTRAVENOUS
Status: DISCONTINUED | OUTPATIENT
Start: 2019-02-14 | End: 2019-02-14 | Stop reason: HOSPADM

## 2019-02-14 RX ADMIN — SUCRALFATE 1 G: 1 SUSPENSION ORAL at 07:53

## 2019-02-14 RX ADMIN — FAMOTIDINE 20 MG: 20 TABLET ORAL at 07:53

## 2019-02-14 RX ADMIN — PROPOFOL 50 MCG/KG/MIN: 10 INJECTION, EMULSION INTRAVENOUS at 13:46

## 2019-02-14 RX ADMIN — PANTOPRAZOLE SODIUM 40 MG: 40 TABLET, DELAYED RELEASE ORAL at 16:15

## 2019-02-14 RX ADMIN — GABAPENTIN 600 MG: 600 TABLET, FILM COATED ORAL at 19:40

## 2019-02-14 RX ADMIN — MIDAZOLAM 0.5 MG: 1 INJECTION INTRAMUSCULAR; INTRAVENOUS at 13:45

## 2019-02-14 RX ADMIN — METFORMIN HYDROCHLORIDE 500 MG: 500 TABLET ORAL at 18:12

## 2019-02-14 RX ADMIN — SODIUM CHLORIDE, POTASSIUM CHLORIDE, SODIUM LACTATE AND CALCIUM CHLORIDE: 600; 310; 30; 20 INJECTION, SOLUTION INTRAVENOUS at 13:29

## 2019-02-14 RX ADMIN — PANTOPRAZOLE SODIUM 40 MG: 40 TABLET, DELAYED RELEASE ORAL at 07:53

## 2019-02-14 RX ADMIN — BUMETANIDE 2 MG: 2 TABLET ORAL at 07:53

## 2019-02-14 RX ADMIN — GABAPENTIN 600 MG: 600 TABLET, FILM COATED ORAL at 07:53

## 2019-02-14 RX ADMIN — FENTANYL CITRATE 25 MCG: 50 INJECTION, SOLUTION INTRAMUSCULAR; INTRAVENOUS at 13:45

## 2019-02-14 RX ADMIN — SUCRALFATE 1 G: 1 SUSPENSION ORAL at 16:15

## 2019-02-14 RX ADMIN — BUMETANIDE 1 MG: 0.5 TABLET ORAL at 16:15

## 2019-02-14 RX ADMIN — OXYCODONE HYDROCHLORIDE AND ACETAMINOPHEN 1 TABLET: 5; 325 TABLET ORAL at 07:52

## 2019-02-14 RX ADMIN — AMLODIPINE BESYLATE 10 MG: 10 TABLET ORAL at 07:52

## 2019-02-14 RX ADMIN — CYANOCOBALAMIN TAB 1000 MCG 1000 MCG: 1000 TAB at 07:52

## 2019-02-14 RX ADMIN — ATORVASTATIN CALCIUM 20 MG: 20 TABLET, FILM COATED ORAL at 07:53

## 2019-02-14 RX ADMIN — ONDANSETRON 4 MG: 2 INJECTION INTRAMUSCULAR; INTRAVENOUS at 13:54

## 2019-02-14 RX ADMIN — CARVEDILOL 12.5 MG: 12.5 TABLET, FILM COATED ORAL at 07:53

## 2019-02-14 RX ADMIN — ACETAMINOPHEN 650 MG: 325 TABLET, FILM COATED ORAL at 16:15

## 2019-02-14 RX ADMIN — LISINOPRIL 5 MG: 5 TABLET ORAL at 07:52

## 2019-02-14 RX ADMIN — POTASSIUM CHLORIDE 20 MEQ: 1500 TABLET, EXTENDED RELEASE ORAL at 07:52

## 2019-02-14 RX ADMIN — FENTANYL CITRATE 50 MCG: 50 INJECTION, SOLUTION INTRAMUSCULAR; INTRAVENOUS at 13:31

## 2019-02-14 RX ADMIN — OXYBUTYNIN CHLORIDE 15 MG: 5 TABLET, EXTENDED RELEASE ORAL at 07:52

## 2019-02-14 RX ADMIN — MIDAZOLAM 1 MG: 1 INJECTION INTRAMUSCULAR; INTRAVENOUS at 13:29

## 2019-02-14 ASSESSMENT — ACTIVITIES OF DAILY LIVING (ADL)
ADLS_ACUITY_SCORE: 16

## 2019-02-14 ASSESSMENT — ENCOUNTER SYMPTOMS: DYSRHYTHMIAS: 1

## 2019-02-14 ASSESSMENT — MIFFLIN-ST. JEOR: SCORE: 1207.51

## 2019-02-14 NOTE — ANESTHESIA CARE TRANSFER NOTE
Patient: Rhonda Mathur    Procedure(s):  ESOPHAGOSCOPY, GASTROSCOPY, DUODENOSCOPY (EGD)  Rm 608 with biopsys    Diagnosis: Epigastric pain  Diagnosis Additional Information: No value filed.    Anesthesia Type:   MAC     Note:  Airway :Room Air  Patient transferred to:PACU  Comments: To PACU, adequate gas exchange, report to RN.Handoff Report: Identifed the Patient, Identified the Reponsible Provider, Reviewed the pertinent medical history, Discussed the surgical course, Reviewed Intra-OP anesthesia mangement and issues during anesthesia, Set expectations for post-procedure period and Allowed opportunity for questions and acknowledgement of understanding      Vitals: (Last set prior to Anesthesia Care Transfer)    CRNA VITALS  2/14/2019 1326 - 2/14/2019 1401      2/14/2019             Pulse:  58    SpO2:  97 %                Electronically Signed By: FREDY Ford CRNA  February 14, 2019  2:01 PM

## 2019-02-14 NOTE — PROVIDER NOTIFICATION
Pt requesting an additional Percocet per home schedule d/t chronic back pain. Pt c/o stomach discomfort, has EGD tomorrow to determine cause of the stomach pain. scheduled and PRN Tylenol is not providing relief per pt. Please advise. TY

## 2019-02-14 NOTE — ANESTHESIA PREPROCEDURE EVALUATION
Anesthesia Pre-Procedure Evaluation    Patient: Rhonda Mathur   MRN: [id number] : 1940          Preoperative Diagnosis: Epigastric pain    Procedure(s):  ESOPHAGOSCOPY, GASTROSCOPY, DUODENOSCOPY (EGD)  Rm 608    History reviewed. No pertinent past medical history.  No past surgical history on file.  Anesthesia Evaluation     .             ROS/MED HX    ENT/Pulmonary:  - neg pulmonary ROS     Neurologic:  - neg neurologic ROS     Cardiovascular: Comment: Pauses on telemetry    (+) ----. : . . . :. dysrhythmias Other, .       METS/Exercise Tolerance:     Hematologic:  - neg hematologic  ROS       Musculoskeletal:  - neg musculoskeletal ROS       GI/Hepatic:     (+) Other GI/Hepatic (Abdominal pain)       Renal/Genitourinary:  - ROS Renal section negative       Endo: Comment: .Body mass index is 25.82 kg/m .      (+) type II DM .      Psychiatric:  - neg psychiatric ROS       Infectious Disease:  - neg infectious disease ROS       Malignancy:         Other: Comment: .Body mass index is 25.82 kg/m .                           Physical Exam  Normal systems: cardiovascular and pulmonary    Airway   Mallampati: II    Dental     Cardiovascular   Rhythm and rate: regular and normal      Pulmonary    breath sounds clear to auscultation            Lab Results   Component Value Date    WBC 5.9 2019    HGB 11.7 2019    HCT 38.5 2019     2019     2019    POTASSIUM 3.6 2019    CHLORIDE 99 2019    CO2 33 (H) 2019    BUN 16 2019    CR 1.15 (H) 2019    GLC 90 2019    CONRADO 8.7 2019    MAG 1.6 2019    ALBUMIN 3.7 2019    PROTTOTAL 6.9 2019    ALT 15 2019    AST 18 2019    ALKPHOS 77 2019    BILITOTAL 0.4 2019    LIPASE 39 (L) 2019       Preop Vitals  BP Readings from Last 3 Encounters:   19 128/60    Pulse Readings from Last 3 Encounters:   19 64      Resp Readings from Last 3  "Encounters:   02/14/19 18    SpO2 Readings from Last 3 Encounters:   02/14/19 93%      Temp Readings from Last 1 Encounters:   02/14/19 97.8  F (36.6  C) (Oral)    Ht Readings from Last 1 Encounters:   02/12/19 1.676 m (5' 6\")      Wt Readings from Last 1 Encounters:   02/14/19 72.6 kg (160 lb)    Estimated body mass index is 25.82 kg/m  as calculated from the following:    Height as of this encounter: 1.676 m (5' 6\").    Weight as of this encounter: 72.6 kg (160 lb).       Anesthesia Plan      History & Physical Review  History and physical reviewed and following examination; no interval change.    ASA Status:  3 .        Plan for MAC Reason for MAC:  Difficulty with conscious sedation (QS), Chronic cardiopulmonary disease (G9) and Procedure to face, neck, head or breast  PONV prophylaxis:  Ondansetron (or other 5HT-3)       Postoperative Care  Postoperative pain management:  IV analgesics, Oral pain medications and Multi-modal analgesia.      Consents                 Robert King DO                    .  "

## 2019-02-14 NOTE — PLAN OF CARE
Pt is AOx4, SBA with transfers, VSS. Pt continues to c/o of abdominal pain, PRN tylenol ineffective. One time dose of Percocet administered with minimal relief. Aqua k pad encouraged for abdominal discomfort. Bowels active x4, no flatus. Asa and eliquis discontinued, metformin to be held in am. Pt is NPO at midnight for EGD tomorrow. Will continue POC.

## 2019-02-14 NOTE — PROGRESS NOTES
"[facility]  Hospitalist Progress Note  Rony Caicedo MD 02/14/2019    Reason for Stay (Diagnosis): Abdominal pain         Assessment and Plan:      Summary of Stay: Rhonda Mathur is a 79 year old female with multiple medical history such as chronic atrial fibrillation on oral anticoagulation, CAD, chronic heart failure with preserved ejection fraction, hypertension, diabetes mellitus non-insulin-requiring, dyslipidemia, prior anemia, currently living independently at home and presented in the emergency room mostly due to increasing episodes of abdominal discomfort and pain leading to decreased oral intake at least for the past 1-2 days.      Problem List:   1. Abdominal pain: GI consulted and endoscopy results reviewed.  Patient has peptic ulcer disease.  Continue PPI, appreciate GI input.  No NSAIDs.  Follow biopsy results and H. pylori testing  2. Hypokalemia: Replace per protocol  3. CHF: Stable  4. Type 2 diabetes: Blood sugar controlled  5. Hypertension, controlled  6. Generalized weakness: PT OT and discharge planning  7. Frequent cardiac pauses: Patient is asymptomatic without hypotension or chest pain.  Care discussed with cardiology team.  Plan is to decrease beta-blocker and monitor patient.  Continue telemetry monitoring and outpatient cardiology consultation will be obtained tomorrow  DVT Prophylaxis: Pneumatic Compression Devices    Code Status: DNR / DNI  Discharge Dispo: Home  Estimated Disch Date / # of Days until Disch: May discharge in the next 1 or 2 days if she remains stable        Interval History (Subjective):      Patient was seen and examined by me this morning.  Continues to have abdominal pain.  I saw patient before endoscopy procedure.  I spoke with cardiology team         Physical Exam:      Last Vital Signs:  /56 (BP Location: Right arm)   Pulse 57   Temp 98.7  F (37.1  C) (Oral)   Resp 16   Ht 1.676 m (5' 6\")   Wt 72.6 kg (160 lb)   SpO2 97%   BMI 25.82 " kg/m        Intake/Output Summary (Last 24 hours) at 2/12/2019 1531  Last data filed at 2/12/2019 1500  Gross per 24 hour   Intake 400 ml   Output 800 ml   Net -400 ml       Constitutional: Awake, alert, cooperative, no apparent distress   Respiratory: Clear to auscultation bilaterally, no crackles or wheezing   Cardiovascular: Regular rate and rhythm, normal S1 and S2, and no murmur noted   Abdomen: Normal bowel sounds, soft, non-distended, non-tender   Skin: No rashes, no cyanosis, dry to touch   Neuro: Alert and oriented x3, no weakness, numbness, memory loss   Extremities: No edema, normal range of motion   Other(s):        All other systems: Negative          Medications:      All current medications were reviewed with changes reflected in problem list.         Data:      All new lab and imaging data was reviewed.   Labs:  All laboratory and imaging data in the past 24 hours reviewed     Recent Labs   Lab 02/14/19  0735 02/13/19  1304 02/11/19 1917   WBC 5.9  --  6.4   HGB 11.7 12.6 11.6*   HCT 38.5  --  37.5   MCV 87  --  87     --  245     No results for input(s): CULT in the last 168 hours.  Recent Labs   Lab 02/14/19  0735 02/12/19  0734 02/12/19  0111 02/11/19  1918 02/11/19  1917    138  --   --  138   POTASSIUM 3.6 3.8 3.0*  --  2.7*   CHLORIDE 99 101  --   --  96   CO2 33* 31  --   --  33*   ANIONGAP 5 6  --   --  9   GLC 90 99  --   --  107*   BUN 16 12  --   --  14   CR 1.15* 0.94  --   --  1.01   GFRESTIMATED 45* 57*  --  48* 52*   GFRESTBLACK 52* 66  --  58* 60*   CONRADO 8.7 8.5  --   --  8.4*   MAG 1.6  --  2.0  --   --    PROTTOTAL  --   --   --   --  6.9   ALBUMIN  --   --   --   --  3.7   BILITOTAL  --   --   --   --  0.4   ALKPHOS  --   --   --   --  77   AST  --   --   --   --  18   ALT  --   --   --   --  15       Recent Labs   Lab 02/14/19  1441 02/14/19  1146 02/14/19  0735 02/14/19  0212 02/13/19  2117 02/13/19  1744  02/12/19  0734  02/11/19  1917   GLC  --   --  90  --   --    --   --  99  --  107*   BGM 76 104*  --  93 142* 109*   < >  --    < >  --     < > = values in this interval not displayed.           No results for input(s): INR in the last 168 hours.        No results for input(s): TROPONIN, TROPI, TROPR in the last 168 hours.    Invalid input(s): TROP, TROPONINIES    Recent Results (from the past 48 hour(s))   Abd/pelvis CT,  IV  contrast only TRAUMA / AAA    Narrative    CT ABDOMEN AND PELVIS WITH CONTRAST   2/11/2019 7:45 PM     HISTORY: Epigastric abdominal pain radiating to back.    TECHNIQUE:  CT abdomen and pelvis with 81 mL Isovue-370 IV. Radiation  dose for this scan was reduced using automated exposure control,  adjustment of the mA and/or kV according to patient size, or iterative  reconstruction technique.    COMPARISON: None.    FINDINGS:  Abdomen: The lung bases are unremarkable. The heart is enlarged. The  gallbladder is absent. The liver, spleen, pancreas, adrenal glands are  normal in appearance. There are several small low-attenuation cortical  lesions in the kidneys bilaterally which are likely cysts. There is  mild right renal atrophy when compared to the left. No hydronephrosis.  No abdominal or pelvic lymph node enlargement. There is  atherosclerotic calcification of aorta and its branches. No aneurysm.    Pelvis: There is no bowel obstruction or inflammation. No free  intraperitoneal gas or fluid. Uterus and adnexa appear grossly normal.  There are postoperative changes in the anterior abdominal wall.  Degenerative disease in the spine.      Impression    IMPRESSION:  1. No acute abnormality. No bowel obstruction or inflammation.  2. Cardiomegaly.    MEERA DAMIAN MD

## 2019-02-14 NOTE — PLAN OF CARE
0712 - MD paged - Tele tech noted several (24) pauses throughout the night (ranges mostly from 2.0-2.2). At 0523 - noted 2.8 sec pause. No sob or chest pain.     Pt up with 1 assist, walker, and gait belt. Pt forgetful at times but slept well throughout the night. LS clear, no O2 needed tonight. BS active, tenderness noted in the upper abdomen - ibuprofen given last evening. Passing flatus, LBM 2/12/19. CMS - neuropathy noted from bilateral knees down to feet. Skin intact. Voiding adequately. K and Mg protocols. DNR/DNI. Remote tele monitoring - see notes above.  Plan for an EGD today at 1300.

## 2019-02-14 NOTE — PROGRESS NOTES
Patient was seen and examined by me this morning.  She is doing well but still complaining of epigastric abdominal pain.  Telemetry been monitored and she has frequent pauses but remained asymptomatic with no chest pain or shortness of breath.  Patient is concerned about frequent pauses and I would like a cardiology consultation.  Consultation for cardiologist placed and a endoscopist to do EGD today.

## 2019-02-14 NOTE — PLAN OF CARE
Alert and oriented. Forgetful at times. NPO for EGD procedure. Transfers with Ax1, gait belt, and walker. Voiding in adequate amounts. Pain managed with prn Percocet. Cardiology consulted d/t pauses noted with telemetry monitoring. Monitoring BG levels. Pt to have EGD at 1300, then will continue to monitor.

## 2019-02-14 NOTE — CONSULTS
[facility]    Cardiology Consultation     Date of Admission:  2/11/2019    Assessment & Plan   A very pleasant 79-year-old female with chronic atrial fibrillation on rate control with carvedilol, apixaban for stroke prophylaxis, moderate nonobstructive carotid disease, normal LV function, hypertension, diabetes who was admitted with abdominal pain.  Cardiology consulted because of frequent pauses up to about 2.1-2.2seconds.  Patient is asymptomatic without any episode of loss of consciousness or syncope or presyncope.  No chest discomfort or shortness of breath.  Carvedilol dose has been decreased from 12.5-6.25 g twice daily .  At this time I recommend no other changes.  She can continue follow-up with her primary cardiologist at [facility].    Thank you for involving the care of Ms. Mathur.  Cardiology will sign off.  Please call with any questions.    Dakota Sandra MD    Primary Care Physician   Diego Taveras    Reason for Consult   Reason for consult: I was asked by Rony Caicedo MD to evaluate this patient for frequent pauses.    History of Present Illness   Rhonda Mathur is a 79 year old female who presents with abdominal pain.  Patient has chronic atrial fibrillation with underlying right bundle branch block and has moderate nonobstructive artery disease on coronary angiogram with normal LV function.  She is admitted with abdominal pain and underwent EGD today that shows nonbleeding gastric ulcers.  Cardiology not consulted because of frequent pauses up to 2.1-2.2 seconds.  Patient has no cardiac complaints.  No presyncope syncope lightheadedness.  No chest discomfort.  She is a 12.5 mg of carvedilol her ventricular rates seem to be quite well controlled with the in 60s.  She is on apixaban for stroke prophylaxis.  EKG shows atrial fibrillation with right bundle branch block.  Ventricular rates are mostly in 60s.    Patient Active Problem List   Diagnosis     Hypokalemia       Past  Medical History   I have reviewed this patient's medical history and updated it with pertinent information if needed.   History reviewed. No pertinent past medical history.    Past Surgical History   I have reviewed this patient's surgical history and updated it with pertinent information if needed.  History reviewed. No pertinent surgical history.    Prior to Admission Medications   Prior to Admission Medications   Prescriptions Last Dose Informant Patient Reported? Taking?   acetaminophen (TYLENOL) 325 MG tablet   Yes Yes   Sig: Take 325-650 mg by mouth every 4 hours as needed for mild pain   albuterol (PROAIR HFA/PROVENTIL HFA/VENTOLIN HFA) 108 (90 Base) MCG/ACT inhaler   Yes Yes   Sig: Inhale 2-4 puffs into the lungs every 4 hours as needed for shortness of breath / dyspnea or wheezing   amLODIPine (NORVASC) 10 MG tablet 2/11/2019 at Unknown time  Yes Yes   Sig: Take 10 mg by mouth daily   apixaban ANTICOAGULANT (ELIQUIS) 5 MG tablet 2/11/2019 at x1  Yes Yes   Sig: Take 5 mg by mouth 2 times daily   aspirin 81 MG EC tablet 2/11/2019 at Unknown time  Yes Yes   Sig: Take 81 mg by mouth daily   atorvastatin (LIPITOR) 20 MG tablet 2/11/2019 at Unknown time  Yes Yes   Sig: Take 20 mg by mouth daily   bumetanide (BUMEX) 1 MG tablet 2/11/2019 at Unknown time  Yes Yes   Sig: Take 2 mg by mouth every morning   bumetanide (BUMEX) 1 MG tablet 2/10/2019 at Unknown time  Yes Yes   Sig: Take 1 mg by mouth In afternoon   carvedilol (COREG) 12.5 MG tablet 2/11/2019 at x1  Yes Yes   Sig: Take 12.5 mg by mouth 2 times daily (with meals)   gabapentin (NEURONTIN) 600 MG tablet 2/11/2019 at x1  Yes Yes   Sig: Take 600 mg by mouth 2 times daily   lisinopril (PRINIVIL/ZESTRIL) 5 MG tablet 2/11/2019 at Unknown time  Yes Yes   Sig: Take 5 mg by mouth daily   metFORMIN (GLUCOPHAGE) 500 MG tablet 2/11/2019 at x1  Yes Yes   Sig: Take 500 mg by mouth 2 times daily (with meals)   nitroGLYcerin (NITROSTAT) 0.4 MG sublingual tablet   Yes Yes    Sig: Place 0.4 mg under the tongue every 5 minutes as needed for chest pain For chest pain place 1 tablet under the tongue every 5 minutes for 3 doses. If symptoms persist 5 minutes after 1st dose call 911.   omeprazole (PRILOSEC) 20 MG DR capsule 2/11/2019 at x1  Yes Yes   Sig: Take 20 mg by mouth 2 times daily   oxyCODONE-acetaminophen (PERCOCET) 5-325 MG tablet   Yes Yes   Sig: Take 1 tablet by mouth daily as needed for severe pain   oxybutynin ER (DITROPAN XL) 15 MG 24 hr tablet 2/11/2019 at Unknown time  Yes Yes   Sig: Take 15 mg by mouth daily   potassium chloride ER (K-DUR/KLOR-CON M) 20 MEQ CR tablet 2/11/2019 at Unknown time  Yes Yes   Sig: Take 20 mEq by mouth daily   vitamin B-12 (CYANOCOBALAMIN) 1000 MCG tablet 2/11/2019 at Unknown time  Yes Yes   Sig: Take 1,000 mcg by mouth daily      Facility-Administered Medications: None     Current Facility-Administered Medications   Medication Dose Route Frequency     amLODIPine  10 mg Oral Daily     atorvastatin  20 mg Oral Daily     bumetanide  1 mg Oral Daily     bumetanide  2 mg Oral QAM     carvedilol  6.25 mg Oral BID w/meals     [START ON 2/15/2019] famotidine  20 mg Oral Daily     gabapentin  600 mg Oral BID     insulin aspart  1-7 Units Subcutaneous TID AC     insulin aspart  1-5 Units Subcutaneous At Bedtime     lisinopril  5 mg Oral Daily     metFORMIN  500 mg Oral BID w/meals     oxybutynin ER  15 mg Oral Daily     pantoprazole  40 mg Oral BID AC     potassium chloride ER  20 mEq Oral Daily     sucralfate  1 g Oral BID AC     vitamin B-12  1,000 mcg Oral Daily     Current Facility-Administered Medications   Medication Last Rate     - MEDICATION INSTRUCTIONS -       - MEDICATION INSTRUCTIONS -       Allergies   No Known Allergies    Social History        Family History   History reviewed. No pertinent family history.    Review of Systems   The comprehensive 10 point Review of Systems is negative other than noted in the HPI or here.     Physical Exam  "  Vital Signs with Ranges  Temp:  [97.4  F (36.3  C)-98.9  F (37.2  C)] 98.7  F (37.1  C)  Pulse:  [57-79] 57  Heart Rate:  [34-71] 71  Resp:  [8-20] 16  BP: ()/(53-77) 111/56  SpO2:  [92 %-100 %] 97 %  Wt Readings from Last 4 Encounters:   02/14/19 72.6 kg (160 lb)     I/O last 3 completed shifts:  In: 640 [P.O.:240; I.V.:400]  Out: 2500 [Urine:2500]      Vitals: /56 (BP Location: Right arm)   Pulse 57   Temp 98.7  F (37.1  C) (Oral)   Resp 16   Ht 1.676 m (5' 6\")   Wt 72.6 kg (160 lb)   SpO2 97%   BMI 25.82 kg/m    General patient appears pleasant, comfortable  Neck normal JVP  Cardiovascular system irregular normal rate no murmur rub or gallop  Respiratory system clear to auscultation bilaterally  GI system abdomen soft  Extremities no pitting pedal edema  Neurological alert, oriented  Psych normal affect  Skin no obvious rash  HEENT no pallor    No lab results found in last 7 days.    Invalid input(s): TROPONINIES    Recent Labs   Lab 02/14/19  0735 02/13/19  1304 02/12/19  0734 02/12/19  0111 02/11/19  1918 02/11/19 1917   WBC 5.9  --   --   --   --  6.4   HGB 11.7 12.6  --   --   --  11.6*   MCV 87  --   --   --   --  87     --   --   --   --  245     --  138  --   --  138   POTASSIUM 3.6  --  3.8 3.0*  --  2.7*   CHLORIDE 99  --  101  --   --  96   CO2 33*  --  31  --   --  33*   BUN 16  --  12  --   --  14   CR 1.15*  --  0.94  --   --  1.01   GFRESTIMATED 45*  --  57*  --  48* 52*   GFRESTBLACK 52*  --  66  --  58* 60*   ANIONGAP 5  --  6  --   --  9   CONRADO 8.7  --  8.5  --   --  8.4*   GLC 90  --  99  --   --  107*   ALBUMIN  --   --   --   --   --  3.7   PROTTOTAL  --   --   --   --   --  6.9   BILITOTAL  --   --   --   --   --  0.4   ALKPHOS  --   --   --   --   --  77   ALT  --   --   --   --   --  15   AST  --   --   --   --   --  18   LIPASE  --   --   --   --   --  39*     No results for input(s): CHOL, HDL, LDL, TRIG, CHOLHDLRATIO in the last 45654 hours.  Recent Labs "   Lab 02/14/19  0735 02/13/19  1304 02/11/19 1917   WBC 5.9  --  6.4   HGB 11.7 12.6 11.6*   HCT 38.5  --  37.5   MCV 87  --  87     --  245     No results for input(s): PH, PHV, PO2, PO2V, SAT, PCO2, PCO2V, HCO3, HCO3V in the last 168 hours.  Recent Labs   Lab 02/11/19 1917   NTBNPI 5,531*     No results for input(s): DD in the last 168 hours.  No results for input(s): SED, CRP in the last 168 hours.  Recent Labs   Lab 02/14/19  0735 02/11/19 1917    245     No results for input(s): TSH in the last 168 hours.  No results for input(s): COLOR, APPEARANCE, URINEGLC, URINEBILI, URINEKETONE, SG, UBLD, URINEPH, PROTEIN, UROBILINOGEN, NITRITE, LEUKEST, RBCU, WBCU in the last 168 hours.    Imaging:  No results found for this or any previous visit (from the past 48 hour(s)).    Echo:  No results found for this or any previous visit (from the past 4320 hour(s)).

## 2019-02-15 ENCOUNTER — APPOINTMENT (OUTPATIENT)
Dept: CT IMAGING | Facility: CLINIC | Age: 79
DRG: 383 | End: 2019-02-15
Attending: INTERNAL MEDICINE
Payer: MEDICARE

## 2019-02-15 ENCOUNTER — APPOINTMENT (OUTPATIENT)
Dept: OCCUPATIONAL THERAPY | Facility: CLINIC | Age: 79
DRG: 383 | End: 2019-02-15
Attending: INTERNAL MEDICINE
Payer: MEDICARE

## 2019-02-15 ENCOUNTER — APPOINTMENT (OUTPATIENT)
Dept: PHYSICAL THERAPY | Facility: CLINIC | Age: 79
DRG: 383 | End: 2019-02-15
Attending: INTERNAL MEDICINE
Payer: MEDICARE

## 2019-02-15 LAB
ANION GAP SERPL CALCULATED.3IONS-SCNC: 7 MMOL/L (ref 3–14)
BUN SERPL-MCNC: 15 MG/DL (ref 7–30)
CALCIUM SERPL-MCNC: 8.9 MG/DL (ref 8.5–10.1)
CHLORIDE SERPL-SCNC: 98 MMOL/L (ref 94–109)
CO2 SERPL-SCNC: 33 MMOL/L (ref 20–32)
CREAT SERPL-MCNC: 1.21 MG/DL (ref 0.52–1.04)
ERYTHROCYTE [DISTWIDTH] IN BLOOD BY AUTOMATED COUNT: 15.7 % (ref 10–15)
GFR SERPL CREATININE-BSD FRML MDRD: 42 ML/MIN/{1.73_M2}
GLUCOSE BLDC GLUCOMTR-MCNC: 102 MG/DL (ref 70–99)
GLUCOSE BLDC GLUCOMTR-MCNC: 83 MG/DL (ref 70–99)
GLUCOSE BLDC GLUCOMTR-MCNC: 87 MG/DL (ref 70–99)
GLUCOSE BLDC GLUCOMTR-MCNC: 89 MG/DL (ref 70–99)
GLUCOSE BLDC GLUCOMTR-MCNC: 97 MG/DL (ref 70–99)
GLUCOSE BLDC GLUCOMTR-MCNC: 99 MG/DL (ref 70–99)
GLUCOSE SERPL-MCNC: 93 MG/DL (ref 70–99)
HCT VFR BLD AUTO: 40.7 % (ref 35–47)
HGB BLD-MCNC: 12.4 G/DL (ref 11.7–15.7)
MCH RBC QN AUTO: 26.6 PG (ref 26.5–33)
MCHC RBC AUTO-ENTMCNC: 30.5 G/DL (ref 31.5–36.5)
MCV RBC AUTO: 87 FL (ref 78–100)
PLATELET # BLD AUTO: 235 10E9/L (ref 150–450)
POTASSIUM SERPL-SCNC: 3.7 MMOL/L (ref 3.4–5.3)
RBC # BLD AUTO: 4.67 10E12/L (ref 3.8–5.2)
SODIUM SERPL-SCNC: 138 MMOL/L (ref 133–144)
TROPONIN I SERPL-MCNC: <0.015 UG/L (ref 0–0.04)
WBC # BLD AUTO: 7.5 10E9/L (ref 4–11)

## 2019-02-15 PROCEDURE — 97535 SELF CARE MNGMENT TRAINING: CPT | Mod: GO

## 2019-02-15 PROCEDURE — A9270 NON-COVERED ITEM OR SERVICE: HCPCS | Mod: GY | Performed by: INTERNAL MEDICINE

## 2019-02-15 PROCEDURE — 99233 SBSQ HOSP IP/OBS HIGH 50: CPT | Performed by: INTERNAL MEDICINE

## 2019-02-15 PROCEDURE — 25000128 H RX IP 250 OP 636: Performed by: INTERNAL MEDICINE

## 2019-02-15 PROCEDURE — 97530 THERAPEUTIC ACTIVITIES: CPT | Mod: GO

## 2019-02-15 PROCEDURE — 36415 COLL VENOUS BLD VENIPUNCTURE: CPT | Performed by: INTERNAL MEDICINE

## 2019-02-15 PROCEDURE — C9113 INJ PANTOPRAZOLE SODIUM, VIA: HCPCS | Performed by: INTERNAL MEDICINE

## 2019-02-15 PROCEDURE — 00000146 ZZHCL STATISTIC GLUCOSE BY METER IP

## 2019-02-15 PROCEDURE — 97165 OT EVAL LOW COMPLEX 30 MIN: CPT | Mod: GO

## 2019-02-15 PROCEDURE — 25000132 ZZH RX MED GY IP 250 OP 250 PS 637: Mod: GY | Performed by: INTERNAL MEDICINE

## 2019-02-15 PROCEDURE — 80048 BASIC METABOLIC PNL TOTAL CA: CPT | Performed by: INTERNAL MEDICINE

## 2019-02-15 PROCEDURE — 84484 ASSAY OF TROPONIN QUANT: CPT | Performed by: INTERNAL MEDICINE

## 2019-02-15 PROCEDURE — 99232 SBSQ HOSP IP/OBS MODERATE 35: CPT | Performed by: INTERNAL MEDICINE

## 2019-02-15 PROCEDURE — 85027 COMPLETE CBC AUTOMATED: CPT | Performed by: INTERNAL MEDICINE

## 2019-02-15 PROCEDURE — 97162 PT EVAL MOD COMPLEX 30 MIN: CPT | Mod: GP

## 2019-02-15 PROCEDURE — 12000000 ZZH R&B MED SURG/OB

## 2019-02-15 PROCEDURE — 70450 CT HEAD/BRAIN W/O DYE: CPT

## 2019-02-15 PROCEDURE — 97530 THERAPEUTIC ACTIVITIES: CPT | Mod: GP

## 2019-02-15 RX ORDER — CARVEDILOL 12.5 MG/1
12.5 TABLET ORAL 2 TIMES DAILY WITH MEALS
Status: DISCONTINUED | OUTPATIENT
Start: 2019-02-15 | End: 2019-02-15

## 2019-02-15 RX ADMIN — PANTOPRAZOLE SODIUM 40 MG: 40 INJECTION, POWDER, FOR SOLUTION INTRAVENOUS at 14:23

## 2019-02-15 RX ADMIN — ONDANSETRON 4 MG: 2 INJECTION INTRAMUSCULAR; INTRAVENOUS at 03:46

## 2019-02-15 RX ADMIN — OXYCODONE HYDROCHLORIDE AND ACETAMINOPHEN 1 TABLET: 5; 325 TABLET ORAL at 18:09

## 2019-02-15 RX ADMIN — OXYBUTYNIN CHLORIDE 15 MG: 5 TABLET, EXTENDED RELEASE ORAL at 09:10

## 2019-02-15 RX ADMIN — SUCRALFATE 1 G: 1 SUSPENSION ORAL at 07:05

## 2019-02-15 RX ADMIN — CYANOCOBALAMIN TAB 1000 MCG 1000 MCG: 1000 TAB at 09:10

## 2019-02-15 RX ADMIN — PANTOPRAZOLE SODIUM 40 MG: 40 INJECTION, POWDER, FOR SOLUTION INTRAVENOUS at 20:05

## 2019-02-15 RX ADMIN — BUMETANIDE 1 MG: 0.5 TABLET ORAL at 17:07

## 2019-02-15 RX ADMIN — CARVEDILOL 6.25 MG: 6.25 TABLET, FILM COATED ORAL at 18:02

## 2019-02-15 RX ADMIN — PANTOPRAZOLE SODIUM 40 MG: 40 TABLET, DELAYED RELEASE ORAL at 07:05

## 2019-02-15 RX ADMIN — SUCRALFATE 1 G: 1 SUSPENSION ORAL at 17:07

## 2019-02-15 ASSESSMENT — ACTIVITIES OF DAILY LIVING (ADL)
ADLS_ACUITY_SCORE: 16
ADLS_ACUITY_SCORE: 17
PREVIOUS_RESPONSIBILITIES: MEAL PREP;HOUSEKEEPING;LAUNDRY;SHOPPING;MEDICATION MANAGEMENT;FINANCES
ADLS_ACUITY_SCORE: 16
ADLS_ACUITY_SCORE: 17

## 2019-02-15 ASSESSMENT — MIFFLIN-ST. JEOR: SCORE: 1189.36

## 2019-02-15 NOTE — PLAN OF CARE
"Pt doing well post EGD. Mid epigastric pain that radiates to back- \"better than before\" and no worsening with clear liquid diet. Tylenol prn for pain.  Up with SBA to bathroom- voiding without difficulty. No BM. Blood sugars 90 & 103.   "

## 2019-02-15 NOTE — PLAN OF CARE
This am pt was very sleepy and complained of not feeling good.  Pt stated she felt weak.  MD notified and came to assess pt .  pt was made NPO. Pt had a CT scan of her head to rule out stroke .  This afternoon pt has been alert and oriented. Pt still is a little unsteady with her walk.  Pt was able to drink 90cc of water without any difficulties.  Pt did not cough.  Per Dr. Caicedo if pt did not have difficulty with swallowing her diet could be ADAT. Pts niece is at bed side.

## 2019-02-15 NOTE — PROGRESS NOTES
Patient was reevaluated in the afternoon after multiple tests results were available.  CT scan of the head did not show any evidence of acute intracranial pathology.  Labs and diagnostic studies are not remarkable.  Patient's mental status significantly improved and she was able to communicate alert and oriented x3.  She was even able to walk as well.  I suspect her mental status change was likely related to toxic metabolic encephalopathy related to narcotic and benzodiazepines she received during endoscopy procedure yesterday.  Care plan was discussed with patient's niece as well as patient's daughter over the phone answer question concerns addressed.  Plan is to continue PPI, continue oral medication, advance diet as tolerated, possibly discharge tomorrow if she remains stable.

## 2019-02-15 NOTE — PROGRESS NOTES
[facility]    Cardiology Progress Note    Date of Service (when I saw the patient): 02/15/2019     Assessment & Plan   Rhonda Mathur is a 79 year old female with chronic atrial fibrillation on rate control with carvedilol, apixaban for stroke prophylaxis, moderate nonobstructive carotid disease, normal LV function, hypertension, diabetes who was admitted 2/11/19 with abdominal pain. Cardiology consulted because of frequent pauses up to about 2.1-2.2 seconds.    1. Frequent pauses. Patient is asymptomatic without any episode of loss of consciousness or syncope or presyncope.  No chest discomfort or shortness of breath.  Carvedilol dose has been decreased from 12.5-6.25 g twice daily. Tele over the past 24 hours has shown less frequent pauses, all less than 3 sec which is normal with afib. Mostly noted while sleeping which is not concerning.     Cardiology will sign off.  Please call with any questions.  She can continue follow-up with her primary cardiologist, Dr Ordoñez, at [facility].    Sofi Nelson PA-C      Interval History   Pt with decreased level of consciousness and lethargy this AM, IM involved and working up.    2 of patients family members were available this AM I discussed why cardiology was involved in her care and our findings.     Physical Exam   Temp: 99.5  F (37.5  C) Temp src: Oral BP: 131/60 Pulse: 57 Heart Rate: 80 Resp: 16 SpO2: 92 % O2 Device: None (Room air) Oxygen Delivery: 1 LPM  Vitals:    02/13/19 0541 02/14/19 0718 02/15/19 0705   Weight: 71.8 kg (158 lb 3.2 oz) 72.6 kg (160 lb) 70.8 kg (156 lb)     Vital Signs with Ranges  Temp:  [97.6  F (36.4  C)-99.5  F (37.5  C)] 99.5  F (37.5  C)  Pulse:  [57-79] 57  Heart Rate:  [34-80] 80  Resp:  [8-20] 16  BP: ()/(48-77) 131/60  SpO2:  [92 %-100 %] 92 %  I/O last 3 completed shifts:  In: 900 [P.O.:300; I.V.:600]  Out: 600 [Urine:600]    Constitutional     lethargic     Skin     warm and dry to  touch    Lungs  clear to auscultation     Cardiac  irregular rhythm, no murmurs        Neurological     no gross motor deficits noted, affect appropriate, oriented to time, person and place.      Medications     - MEDICATION INSTRUCTIONS -       - MEDICATION INSTRUCTIONS -         amLODIPine  10 mg Oral Daily     atorvastatin  20 mg Oral Daily     bumetanide  1 mg Oral Daily     bumetanide  2 mg Oral QAM     carvedilol  6.25 mg Oral BID w/meals     famotidine  20 mg Oral Daily     insulin aspart  1-7 Units Subcutaneous TID AC     insulin aspart  1-5 Units Subcutaneous At Bedtime     lisinopril  5 mg Oral Daily     oxybutynin ER  15 mg Oral Daily     pantoprazole (PROTONIX) IV  40 mg Intravenous BID     potassium chloride ER  20 mEq Oral Daily     sucralfate  1 g Oral BID AC     vitamin B-12  1,000 mcg Oral Daily

## 2019-02-15 NOTE — PLAN OF CARE
Discharge Planner PT   Patient plan for discharge: Home  Current status:     Eval complete, treatment indicated. Niece present and supportive. Edu PT role and POC. Pt performed supine > sit with SBA. STS x 3 during session, min A x 1 without AD, CGA with FWW. W/o AD pt very usnteady, 1 LOB to L side. Refused using FWW however provided with education and niece helpful, pt stating she can 't use one at home because she needs to walk the dog. Pt ambulated in hallway with FWW ~50', unable to perform meaningful gait training as pt perseverating on not wanting to use the walker, min A x 1 provided for safe walker management. End of session sit > supine with mod A x 1. Pt fatigued. Cues for midline positioning, left supine with needs in reach   Barriers to return to prior living situation:  Impaired balance, fall risk, impaired activity tolerance, impaired insight into current functional deficits  Recommendations for discharge: ARU  Rationale for recommendations: Based on eval would rec ARU as pt previously IND. However pending LOS and further medical management pt may progress to be safe to discharge to home. Will cont to update recs. Pt requests to discharge to home and may decline rehab. Family in agreement with rehab if does not progress functionally during IP stay        Entered by: Silvia Ovalle 02/15/2019 4:40 PM

## 2019-02-15 NOTE — PROGRESS NOTES
[facility]  Hospitalist Progress Note  Rony Caicedo MD 02/15/2019    Reason for Stay (Diagnosis): Abdominal pain         Assessment and Plan:      Summary of Stay: Rhonda Mathur is a 79 year old female with multiple medical history such as chronic atrial fibrillation on oral anticoagulation, CAD, chronic heart failure with preserved ejection fraction, hypertension, diabetes mellitus non-insulin-requiring, dyslipidemia, prior anemia, currently living independently at home and presented in the emergency room mostly due to increasing episodes of abdominal discomfort and pain leading to decreased oral intake at least for the past 1-2 days.      Problem List:   1. Abdominal pain: GI consulted and endoscopy results reviewed.  Patient has peptic ulcer disease.  Continue PPI, appreciate GI input.  No NSAIDs.  Follow biopsy results and H. pylori testing  2. Hypokalemia: Replace per protocol  3. CHF: Stable  4. Type 2 diabetes: Blood sugar controlled  5. Hypertension, controlled  6. Generalized weakness: PT OT and discharge planning  7. Frequent cardiac pauses: Patient is asymptomatic without hypotension or chest pain.  Care discussed with cardiology team.  Plan is to decrease beta-blocker and monitor patient.  Continue telemetry monitoring and cardiology consultation obtained   8. AMS -acute encephalopathy this morning with lethargy , no focal on exam. CT and labs done not indicative of acute stroke . Neurocheck for now and follow     DVT Prophylaxis: Pneumatic Compression Devices    Code Status: DNR / DNI  Discharge Dispo: Home  Estimated Disch Date / # of Days until Disch: in one day if Mental status improves         Interval History (Subjective):        Patient was seen and examined by me this morning.  He did not feel well this morning.  With decreased level of consciousness and lethargic.  Her vital signs are stable and she is not hypoglycemic.  Her daughter at bedside and care plan was discussed.   "Patient had endoscopy yesterday with evidence of gastric ulcer and received several doses of narcotic and benzodiazepines for sedation yesterday.  Suspect she has toxic metabolic encephalopathy.  I do not see any focal symptoms but acute CVA is to be excluded.  Plan is to keep her n.p.o., get a CT of the head without contrast, get hemoglobin, basic metabolic panel and closely monitoring patient's mental status.  I spoke with patient's bedside nurse and updated family at bedside as well.  We will continue to monitor patient test results.  Please review my detailed progress note at later time.                     Physical Exam:      Last Vital Signs:  /60   Pulse 57   Temp 99.5  F (37.5  C) (Oral)   Resp 16   Ht 1.676 m (5' 6\")   Wt 70.8 kg (156 lb)   SpO2 92%   BMI 25.18 kg/m        Intake/Output Summary (Last 24 hours) at 2/12/2019 1531  Last data filed at 2/12/2019 1500  Gross per 24 hour   Intake 400 ml   Output 800 ml   Net -400 ml       Constitutional: lethargic    Respiratory: Clear to auscultation bilaterally, no crackles or wheezing   Cardiovascular: Regular rate and rhythm, normal S1 and S2, and no murmur noted   Abdomen: Normal bowel sounds, soft, non-distended, non-tender   Skin: No rashes, no cyanosis, dry to touch   Neuro: lethargic , non focal    Extremities: No edema, normal range of motion   Other(s):        All other systems: Negative          Medications:      All current medications were reviewed with changes reflected in problem list.         Data:      All new lab and imaging data was reviewed.   Labs:  All laboratory and imaging data in the past 24 hours reviewed     Recent Labs   Lab 02/15/19  1009 02/14/19  0735 02/13/19  1304 02/11/19  1917   WBC 7.5 5.9  --  6.4   HGB 12.4 11.7 12.6 11.6*   HCT 40.7 38.5  --  37.5   MCV 87 87  --  87    237  --  245     No results for input(s): CULT in the last 168 hours.  Recent Labs   Lab 02/15/19  1009 02/14/19  0735 02/12/19  0734 " 02/12/19  0111  02/11/19 1917    137 138  --   --  138   POTASSIUM 3.7 3.6 3.8 3.0*  --  2.7*   CHLORIDE 98 99 101  --   --  96   CO2 33* 33* 31  --   --  33*   ANIONGAP 7 5 6  --   --  9   GLC 93 90 99  --   --  107*   BUN 15 16 12  --   --  14   CR 1.21* 1.15* 0.94  --   --  1.01   GFRESTIMATED 42* 45* 57*  --    < > 52*   GFRESTBLACK 49* 52* 66  --    < > 60*   CONRADO 8.9 8.7 8.5  --   --  8.4*   MAG  --  1.6  --  2.0  --   --    PROTTOTAL  --   --   --   --   --  6.9   ALBUMIN  --   --   --   --   --  3.7   BILITOTAL  --   --   --   --   --  0.4   ALKPHOS  --   --   --   --   --  77   AST  --   --   --   --   --  18   ALT  --   --   --   --   --  15    < > = values in this interval not displayed.       Recent Labs   Lab 02/15/19  1258 02/15/19  1009 02/15/19  0919 02/15/19  0052 02/14/19  2238 02/14/19  1805  02/14/19  0735  02/12/19  0734  02/11/19 1917   GLC  --  93  --   --   --   --   --  90  --  99  --  107*   BGM 89  --  97 99 103* 90   < >  --    < >  --    < >  --     < > = values in this interval not displayed.           No results for input(s): INR in the last 168 hours.        Recent Labs   Lab 02/15/19  1351 02/15/19  1009   TROPI <0.015 <0.015       Recent Results (from the past 48 hour(s))   Abd/pelvis CT,  IV  contrast only TRAUMA / AAA    Narrative    CT ABDOMEN AND PELVIS WITH CONTRAST   2/11/2019 7:45 PM     HISTORY: Epigastric abdominal pain radiating to back.    TECHNIQUE:  CT abdomen and pelvis with 81 mL [id number] IV. Radiation  dose for this scan was reduced using automated exposure control,  adjustment of the mA and/or kV according to patient size, or iterative  reconstruction technique.    COMPARISON: None.    FINDINGS:  Abdomen: The lung bases are unremarkable. The heart is enlarged. The  gallbladder is absent. The liver, spleen, pancreas, adrenal glands are  normal in appearance. There are several small low-attenuation cortical  lesions in the kidneys bilaterally which are likely  cysts. There is  mild right renal atrophy when compared to the left. No hydronephrosis.  No abdominal or pelvic lymph node enlargement. There is  atherosclerotic calcification of aorta and its branches. No aneurysm.    Pelvis: There is no bowel obstruction or inflammation. No free  intraperitoneal gas or fluid. Uterus and adnexa appear grossly normal.  There are postoperative changes in the anterior abdominal wall.  Degenerative disease in the spine.      Impression    IMPRESSION:  1. No acute abnormality. No bowel obstruction or inflammation.  2. Cardiomegaly.    MEERA DAMIAN MD

## 2019-02-15 NOTE — PROGRESS NOTES
Patient was seen and examined by me this morning.  He did not feel well this morning.  With decreased level of consciousness and lethargic.  Her vital signs are stable and she is not hypoglycemic.  Her daughter at bedside and care plan was discussed.  Patient had endoscopy yesterday with evidence of gastric ulcer and received several doses of narcotic and benzodiazepines for sedation yesterday.  Suspect she has toxic metabolic encephalopathy.  I do not see any focal symptoms but acute CVA is to be excluded.  Plan is to keep her n.p.o., get a CT of the head without contrast, get hemoglobin, basic metabolic panel and closely monitoring patient's mental status.  I spoke with patient's bedside nurse and updated family at bedside as well.  We will continue to monitor patient test results.  Please review my detailed progress note at later time.

## 2019-02-15 NOTE — PROGRESS NOTES
02/15/19 1441   Quick Adds   Type of Visit Initial Occupational Therapy Evaluation   Living Environment   Lives With alone   Living Arrangements independent living facility   Home Accessibility no concerns   Living Environment Comment Pt lives alone in ILF, no environmental concerns, walk in shower, RTS w/ grab bars. Pt's spouse lives in same building in Memory Care Unit - pt spends majority of day with spouse providing cares.    Self-Care   Usual Activity Tolerance good   Current Activity Tolerance fair   Equipment Currently Used at Home none   Functional Level   Ambulation 0-->independent   Transferring 0-->independent   Toileting 0-->independent   Bathing 0-->independent   Dressing 0-->independent   Eating 0-->independent   Communication 0-->understands/communicates without difficulty   Swallowing 0-->swallows foods/liquids without difficulty   Cognition 0 - no cognition issues reported   Fall history within last six months yes   Number of times patient has fallen within last six months 2  (fell taking dog out and slide up hill and fell)   Which of the above functional risks had a recent onset or change? transferring;toileting;bathing;dressing   Prior Functional Level Comment Pt reports indep in all ADLS, IADLs and mobility tasks with no AD at baseline. Pt also assists  who is in Memory Care daily for cares including toileting, transfers - reports spouse gets agitated if pt is not present and assisting. Niece present and confirms PLOF.    General Information   Onset of Illness/Injury or Date of Surgery - Date 02/11/19   Referring Physician Rony Caicedo MD   Patient/Family Goals Statement Pt's goal is to d/c home   Additional Occupational Profile Info/Pertinent History of Current Problem Per chart: Pt is an 79 year old female admitted due to increasing episodes of abdominal discomfort and pain leading to decreased oral intake at least for the past 1-2 days, GI consulted and endoscopy results  reviewed.  Patient has peptic ulcer disease. Pt with AMS episode today-acute encephalopathy this morning with lethargy , no focal on exam. CT and labs done not indicative of acute stroke.   Precautions/Limitations fall precautions   Cognitive Status Examination   Orientation orientation to person, place and time   Level of Consciousness alert;confused   Cognitive Comment Pt mildly confused, demonstrates decreased insight into deficits and the need for assist currently - will continue to monitor   Visual Perception   Visual Perception Wears glasses   Sensory Examination   Sensory Comments Pt denies numbness/tingling in BUEs   Pain Assessment   Patient Currently in Pain No   Range of Motion (ROM)   ROM Comment WFL   Strength   Strength Comments BUE MMT 4/5   Hand Strength   Hand Strength Comments WFL   Coordination   Upper Extremity Coordination No deficits were identified   Bed Mobility Skill: Supine to Sit   Level of [place]: Supine/Sit stand-by assist   Physical Assist/Nonphysical Assist: Supine/Sit 1 person assist   Transfer Skill: Bed to Chair/Chair to Bed   Level of [place]: Bed to Chair minimum assist (75% patients effort)   Physical Assist/Nonphysical Assist: Bed to Chair 1 person assist   Transfer Skill: Sit to Stand   Level of [place]: Sit/Stand contact guard   Physical Assist/Nonphysical Assist: Sit/Stand 1 person assist   Balance   Balance Comments Pt unsteady while in stance, declines use of FWW, multiple small LOB with short ambulation from EOB to bedside chair   Upper Body Dressing   Level of [place]: Dress Upper Body contact guard   Physical Assist/Nonphysical Assist: Dress Upper Body 1 person assist   Lower Body Dressing   Level of [place]: Dress Lower Body contact guard   Physical Assist/Nonphysical Assist: Dress Lower Body 1 person assist   Toileting   Level of [place]: Toilet contact guard   Physical Assist/Nonphysical Assist: Toilet 1 person assist   Grooming   Level  "of [place]: Grooming contact guard   Physical Assist/Nonphysical Assist: Grooming 1 person assist   Instrumental Activities of Daily Living (IADL)   Previous Responsibilities meal prep;housekeeping;laundry;shopping;medication management;finances   Activities of Daily Living Analysis   Impairments Contributing to Impaired Activities of Daily Living balance impaired;cognition impaired;strength decreased   ADL Comments Pt presents to OT below baseline level of functioning in all areas of self cares including bathing, dressing, grooming and toileting   General Therapy Interventions   Planned Therapy Interventions ADL retraining;IADL retraining;strengthening;transfer training   Clinical Impression   Criteria for Skilled Therapeutic Interventions Met yes, treatment indicated   OT Diagnosis Impaired ADLs, IADLs and mobility tasks   Influenced by the following impairments Decreased functional activity tolerance and strength, impaired balance, impaired safety/cognition   Assessment of Occupational Performance 5 or more Performance Deficits   Identified Performance Deficits Bathing, dressing, grooming, toileting, homemaking, transfers   Clinical Decision Making (Complexity) Low complexity   Therapy Frequency daily   Predicted Duration of Therapy Intervention (days/wks) 4 days   Anticipated Discharge Disposition Home with Assist;Home with Home Therapy;Acute Rehabilitation Facility   Risks and Benefits of Treatment have been explained. Yes   Patient, Family & other staff in agreement with plan of care Yes   Clinical Impression Comments Pt would benefit from skilled OT to maximize safety and indep in all ADLs, IADLs and mobility tasks due to current deficits impacting function    [facility] AM-PAC  \"6 Clicks\" Daily Activity Inpatient Short Form   1. Putting on and taking off regular lower body clothing? 3 - A Little   2. Bathing (including washing, rinsing, drying)? 3 - A Little   3. Toileting, which includes using " toilet, bedpan or urinal? 3 - A Little   4. Putting on and taking off regular upper body clothing? 3 - A Little   5. Taking care of personal grooming such as brushing teeth? 3 - A Little   6. Eating meals? 4 - None   Daily Activity Raw Score (Score out of 24.Lower scores equate to lower levels of function) 19   Total Evaluation Time   Total Evaluation Time (Minutes) 10

## 2019-02-15 NOTE — PROGRESS NOTES
GASTROENTEROLOGY CHART UPDATE ( patient not seen)       I have reviewed the patient's new clinical lab results:     Recent Labs   Lab Test 02/14/19  0735 02/13/19  1304 02/11/19 1917   WBC 5.9  --  6.4   HGB 11.7 12.6 11.6*   MCV 87  --  87     --  245     Recent Labs   Lab Test 02/14/19  0735 02/12/19  0734 02/12/19  0111 02/11/19 1917   POTASSIUM 3.6 3.8 3.0* 2.7*   CHLORIDE 99 101  --  96   CO2 33* 31  --  33*   BUN 16 12  --  14   ANIONGAP 5 6  --  9     Recent Labs   Lab Test 02/11/19 1917   ALBUMIN 3.7   BILITOTAL 0.4   ALT 15   AST 18   LIPASE 39*        ENDOSCOPY    2/14 EGD  Impression:          - Normal esophagus.                             - Z-line, 40 cm from the incisors.                             - A large amount of food (residue) in the stomach.                             - Non-bleeding gastric ulcers with no stigmata of                             bleeding. Biopsied.                             - Duodenitis. Biopsied.                ASSESSMENT/ PLAN  Rhonda Mathur is an 78 yo female with history of PUD presenting with epigastric abdominal pain found to have gastric ulcers and duodenitis on EGD.      1. Gastric ulcers / duodenitis : unclear etiology   - Await pathology results.   - Continuje BID PPI, carafate. Avoid all NSAIDs.   - Check H. pylori stool antigen.   - Repeat upper endoscopy in 8 weeks to check healing.    -Will not see over weekend, please call with questions.    Discussed with Dr. Ross Hall, PAPeggyC  [facility]

## 2019-02-15 NOTE — PLAN OF CARE
OT: Order received, eval completed and treatment initiated. Pt is an 79 year old female admitted due to increasing episodes of abdominal discomfort and pain leading to decreased oral intake at least for the past 1-2 days, GI consulted and endoscopy results reviewed.  Patient has peptic ulcer disease. Pt with AMS episode today-acute encephalopathy this morning with lethargy , no focal on exam. CT and labs done not indicative of acute stroke. Pt lives alone in ILF, no environmental concerns, walk in shower, RTS w/ grab bars. Pt's spouse lives in same building in Memory Care Unit - pt spends majority of day with spouse providing cares. Pt reports indep in all ADLS, IADLs and mobility tasks with no AD at baseline. Pt also assists  who is in Memory Care daily for cares including toileting, transfers - reports spouse gets agitated if pt is not present and assisting. Niece present and confirms PLOF.    Discharge Planner OT   Patient plan for discharge: Home  Current status: Pt completed bed mobility supine > sit EOB with HOB elevated with SBA, completed sit > stand from EOB with CGA/min A, declines use of FWW. Pt ambulated short distance to bedside chair with no assistive device and min A due to frequent small LOB. Pt able to complete LB dressing task while seated with set up to don B socks. Pt required lengthy explanation of therapy consults to assess current functioning - decreased insight into current deficits and need for assist.     Highly recommend PT eval prior to returning home.     Barriers to return to prior living situation: Impaired balance, fall risk currently, decreased functional activity tolerance, impaired insight  Recommendations for discharge: Possible ARU candidate, likely pt will decline TCU/ARU, very motivated to return directly home  Rationale for recommendations: Pt is below baseline level of functioning, pt will benefit from ongoing skilled OT to maximize safety and indep in ADLs/IADLS and  mobility tasks. Pt requests to discharge directly home in order to assist her spouse, currently unsafe from a mobility standpoint, however may progress with continued medical stability and ongoing IP therapies to safely return home w/ follow up of HH OT. OT to continue to follow and update recommendations as appropriate.        Entered by: Blanche Watts 02/15/2019 2:59 PM

## 2019-02-16 ENCOUNTER — APPOINTMENT (OUTPATIENT)
Dept: PHYSICAL THERAPY | Facility: CLINIC | Age: 79
DRG: 383 | End: 2019-02-16
Attending: INTERNAL MEDICINE
Payer: MEDICARE

## 2019-02-16 ENCOUNTER — APPOINTMENT (OUTPATIENT)
Dept: OCCUPATIONAL THERAPY | Facility: CLINIC | Age: 79
DRG: 383 | End: 2019-02-16
Attending: INTERNAL MEDICINE
Payer: MEDICARE

## 2019-02-16 ENCOUNTER — APPOINTMENT (OUTPATIENT)
Dept: CT IMAGING | Facility: CLINIC | Age: 79
DRG: 383 | End: 2019-02-16
Attending: PSYCHIATRY & NEUROLOGY
Payer: MEDICARE

## 2019-02-16 ENCOUNTER — APPOINTMENT (OUTPATIENT)
Dept: SPEECH THERAPY | Facility: CLINIC | Age: 79
DRG: 383 | End: 2019-02-16
Attending: INTERNAL MEDICINE
Payer: MEDICARE

## 2019-02-16 ENCOUNTER — APPOINTMENT (OUTPATIENT)
Dept: MRI IMAGING | Facility: CLINIC | Age: 79
DRG: 383 | End: 2019-02-16
Attending: INTERNAL MEDICINE
Payer: MEDICARE

## 2019-02-16 ENCOUNTER — APPOINTMENT (OUTPATIENT)
Dept: GENERAL RADIOLOGY | Facility: CLINIC | Age: 79
DRG: 383 | End: 2019-02-16
Attending: INTERNAL MEDICINE
Payer: MEDICARE

## 2019-02-16 LAB
ALBUMIN SERPL-MCNC: 3.4 G/DL (ref 3.4–5)
ALBUMIN UR-MCNC: NEGATIVE MG/DL
ALP SERPL-CCNC: 72 U/L (ref 40–150)
ALT SERPL W P-5'-P-CCNC: 13 U/L (ref 0–50)
AMMONIA PLAS-SCNC: 17 UMOL/L (ref 10–50)
ANION GAP SERPL CALCULATED.3IONS-SCNC: 5 MMOL/L (ref 3–14)
ANION GAP SERPL CALCULATED.3IONS-SCNC: 7 MMOL/L (ref 3–14)
APPEARANCE UR: CLEAR
AST SERPL W P-5'-P-CCNC: 17 U/L (ref 0–45)
BASE EXCESS BLDA CALC-SCNC: 7.6 MMOL/L
BASOPHILS # BLD AUTO: 0 10E9/L (ref 0–0.2)
BASOPHILS NFR BLD AUTO: 0.3 %
BILIRUB DIRECT SERPL-MCNC: 0.1 MG/DL (ref 0–0.2)
BILIRUB SERPL-MCNC: 0.5 MG/DL (ref 0.2–1.3)
BILIRUB UR QL STRIP: NEGATIVE
BUN SERPL-MCNC: 15 MG/DL (ref 7–30)
BUN SERPL-MCNC: 16 MG/DL (ref 7–30)
CALCIUM SERPL-MCNC: 8.4 MG/DL (ref 8.5–10.1)
CALCIUM SERPL-MCNC: 8.6 MG/DL (ref 8.5–10.1)
CHLORIDE SERPL-SCNC: 97 MMOL/L (ref 94–109)
CHLORIDE SERPL-SCNC: 97 MMOL/L (ref 94–109)
CHOLEST SERPL-MCNC: 151 MG/DL
CO2 SERPL-SCNC: 31 MMOL/L (ref 20–32)
CO2 SERPL-SCNC: 32 MMOL/L (ref 20–32)
COLOR UR AUTO: NORMAL
CORTIS SERPL-MCNC: 17.4 UG/DL (ref 4–22)
CREAT SERPL-MCNC: 1.21 MG/DL (ref 0.52–1.04)
CREAT SERPL-MCNC: 1.22 MG/DL (ref 0.52–1.04)
DIFFERENTIAL METHOD BLD: ABNORMAL
EOSINOPHIL # BLD AUTO: 0.1 10E9/L (ref 0–0.7)
EOSINOPHIL NFR BLD AUTO: 0.9 %
ERYTHROCYTE [DISTWIDTH] IN BLOOD BY AUTOMATED COUNT: 15.6 % (ref 10–15)
GFR SERPL CREATININE-BSD FRML MDRD: 42 ML/MIN/{1.73_M2}
GFR SERPL CREATININE-BSD FRML MDRD: 42 ML/MIN/{1.73_M2}
GLUCOSE BLDC GLUCOMTR-MCNC: 105 MG/DL (ref 70–99)
GLUCOSE BLDC GLUCOMTR-MCNC: 106 MG/DL (ref 70–99)
GLUCOSE BLDC GLUCOMTR-MCNC: 110 MG/DL (ref 70–99)
GLUCOSE BLDC GLUCOMTR-MCNC: 175 MG/DL (ref 70–99)
GLUCOSE BLDC GLUCOMTR-MCNC: 243 MG/DL (ref 70–99)
GLUCOSE BLDC GLUCOMTR-MCNC: 266 MG/DL (ref 70–99)
GLUCOSE BLDC GLUCOMTR-MCNC: 27 MG/DL (ref 70–99)
GLUCOSE BLDC GLUCOMTR-MCNC: 66 MG/DL (ref 70–99)
GLUCOSE BLDC GLUCOMTR-MCNC: 97 MG/DL (ref 70–99)
GLUCOSE BLDC GLUCOMTR-MCNC: 99 MG/DL (ref 70–99)
GLUCOSE SERPL-MCNC: 100 MG/DL (ref 70–99)
GLUCOSE SERPL-MCNC: 194 MG/DL (ref 70–99)
GLUCOSE UR STRIP-MCNC: NEGATIVE MG/DL
HCO3 BLD-SCNC: 33 MMOL/L (ref 21–28)
HCT VFR BLD AUTO: 38.8 % (ref 35–47)
HDLC SERPL-MCNC: 35 MG/DL
HGB BLD-MCNC: 11.7 G/DL (ref 11.7–15.7)
HGB UR QL STRIP: NEGATIVE
IMM GRANULOCYTES # BLD: 0 10E9/L (ref 0–0.4)
IMM GRANULOCYTES NFR BLD: 0.5 %
KETONES UR STRIP-MCNC: NEGATIVE MG/DL
LACTATE BLD-SCNC: 0.9 MMOL/L (ref 0.7–2)
LDLC SERPL CALC-MCNC: 78 MG/DL
LEUKOCYTE ESTERASE UR QL STRIP: NEGATIVE
LYMPHOCYTES # BLD AUTO: 1.7 10E9/L (ref 0.8–5.3)
LYMPHOCYTES NFR BLD AUTO: 25.7 %
MCH RBC QN AUTO: 26.5 PG (ref 26.5–33)
MCHC RBC AUTO-ENTMCNC: 30.2 G/DL (ref 31.5–36.5)
MCV RBC AUTO: 88 FL (ref 78–100)
MONOCYTES # BLD AUTO: 0.8 10E9/L (ref 0–1.3)
MONOCYTES NFR BLD AUTO: 12.6 %
NEUTROPHILS # BLD AUTO: 3.9 10E9/L (ref 1.6–8.3)
NEUTROPHILS NFR BLD AUTO: 60 %
NITRATE UR QL: NEGATIVE
NONHDLC SERPL-MCNC: 116 MG/DL
NRBC # BLD AUTO: 0 10*3/UL
NRBC BLD AUTO-RTO: 0 /100
O2/TOTAL GAS SETTING VFR VENT: 2 %
OXYHGB MFR BLD: 94 % (ref 92–100)
PCO2 BLD: 50 MM HG (ref 35–45)
PH BLD: 7.43 PH (ref 7.35–7.45)
PH UR STRIP: 6 PH (ref 5–7)
PLATELET # BLD AUTO: 213 10E9/L (ref 150–450)
PO2 BLD: 76 MM HG (ref 80–105)
POTASSIUM SERPL-SCNC: 3.2 MMOL/L (ref 3.4–5.3)
POTASSIUM SERPL-SCNC: 3.3 MMOL/L (ref 3.4–5.3)
POTASSIUM SERPL-SCNC: 3.7 MMOL/L (ref 3.4–5.3)
PROCALCITONIN SERPL-MCNC: 0.18 NG/ML
PROT SERPL-MCNC: 6.8 G/DL (ref 6.8–8.8)
RBC # BLD AUTO: 4.41 10E12/L (ref 3.8–5.2)
RBC #/AREA URNS AUTO: 0 /HPF (ref 0–2)
SODIUM SERPL-SCNC: 134 MMOL/L (ref 133–144)
SODIUM SERPL-SCNC: 135 MMOL/L (ref 133–144)
SOURCE: NORMAL
SP GR UR STRIP: 1 (ref 1–1.03)
TRIGL SERPL-MCNC: 189 MG/DL
TSH SERPL DL<=0.005 MIU/L-ACNC: 1.5 MU/L (ref 0.4–4)
UROBILINOGEN UR STRIP-MCNC: 0 MG/DL (ref 0–2)
WBC # BLD AUTO: 6.5 10E9/L (ref 4–11)
WBC #/AREA URNS AUTO: <1 /HPF (ref 0–5)

## 2019-02-16 PROCEDURE — 97535 SELF CARE MNGMENT TRAINING: CPT | Mod: GO

## 2019-02-16 PROCEDURE — 25000132 ZZH RX MED GY IP 250 OP 250 PS 637: Mod: GY | Performed by: INTERNAL MEDICINE

## 2019-02-16 PROCEDURE — 25000128 H RX IP 250 OP 636: Performed by: INTERNAL MEDICINE

## 2019-02-16 PROCEDURE — 99207 ZZC APP CREDIT; MD BILLING SHARED VISIT: CPT | Performed by: INTERNAL MEDICINE

## 2019-02-16 PROCEDURE — 84443 ASSAY THYROID STIM HORMONE: CPT | Performed by: INTERNAL MEDICINE

## 2019-02-16 PROCEDURE — A9270 NON-COVERED ITEM OR SERVICE: HCPCS | Mod: GY | Performed by: INTERNAL MEDICINE

## 2019-02-16 PROCEDURE — 83605 ASSAY OF LACTIC ACID: CPT | Performed by: INTERNAL MEDICINE

## 2019-02-16 PROCEDURE — 93005 ELECTROCARDIOGRAM TRACING: CPT

## 2019-02-16 PROCEDURE — 36600 WITHDRAWAL OF ARTERIAL BLOOD: CPT

## 2019-02-16 PROCEDURE — 25800025 ZZH RX 258: Performed by: INTERNAL MEDICINE

## 2019-02-16 PROCEDURE — A9585 GADOBUTROL INJECTION: HCPCS | Performed by: INTERNAL MEDICINE

## 2019-02-16 PROCEDURE — 40000275 ZZH STATISTIC RCP TIME EA 10 MIN

## 2019-02-16 PROCEDURE — 80048 BASIC METABOLIC PNL TOTAL CA: CPT | Performed by: INTERNAL MEDICINE

## 2019-02-16 PROCEDURE — 99291 CRITICAL CARE FIRST HOUR: CPT | Performed by: INTERNAL MEDICINE

## 2019-02-16 PROCEDURE — 12000000 ZZH R&B MED SURG/OB

## 2019-02-16 PROCEDURE — 80061 LIPID PANEL: CPT | Performed by: INTERNAL MEDICINE

## 2019-02-16 PROCEDURE — 93010 ELECTROCARDIOGRAM REPORT: CPT | Performed by: INTERNAL MEDICINE

## 2019-02-16 PROCEDURE — 92610 EVALUATE SWALLOWING FUNCTION: CPT | Mod: GN | Performed by: SPEECH-LANGUAGE PATHOLOGIST

## 2019-02-16 PROCEDURE — 71045 X-RAY EXAM CHEST 1 VIEW: CPT

## 2019-02-16 PROCEDURE — 80076 HEPATIC FUNCTION PANEL: CPT | Performed by: INTERNAL MEDICINE

## 2019-02-16 PROCEDURE — 85025 COMPLETE CBC W/AUTO DIFF WBC: CPT | Performed by: INTERNAL MEDICINE

## 2019-02-16 PROCEDURE — G0426 INPT/ED TELECONSULT50: HCPCS | Mod: G0 | Performed by: PSYCHIATRY & NEUROLOGY

## 2019-02-16 PROCEDURE — 97530 THERAPEUTIC ACTIVITIES: CPT | Mod: GP

## 2019-02-16 PROCEDURE — 70498 CT ANGIOGRAPHY NECK: CPT

## 2019-02-16 PROCEDURE — C9113 INJ PANTOPRAZOLE SODIUM, VIA: HCPCS | Performed by: INTERNAL MEDICINE

## 2019-02-16 PROCEDURE — 36415 COLL VENOUS BLD VENIPUNCTURE: CPT | Performed by: INTERNAL MEDICINE

## 2019-02-16 PROCEDURE — 70553 MRI BRAIN STEM W/O & W/DYE: CPT

## 2019-02-16 PROCEDURE — 84145 PROCALCITONIN (PCT): CPT | Performed by: INTERNAL MEDICINE

## 2019-02-16 PROCEDURE — 82533 TOTAL CORTISOL: CPT | Performed by: INTERNAL MEDICINE

## 2019-02-16 PROCEDURE — 82805 BLOOD GASES W/O2 SATURATION: CPT | Performed by: INTERNAL MEDICINE

## 2019-02-16 PROCEDURE — 82140 ASSAY OF AMMONIA: CPT | Performed by: INTERNAL MEDICINE

## 2019-02-16 PROCEDURE — 00000146 ZZHCL STATISTIC GLUCOSE BY METER IP

## 2019-02-16 PROCEDURE — 25500064 ZZH RX 255 OP 636: Performed by: INTERNAL MEDICINE

## 2019-02-16 PROCEDURE — 84132 ASSAY OF SERUM POTASSIUM: CPT | Performed by: INTERNAL MEDICINE

## 2019-02-16 PROCEDURE — 81001 URINALYSIS AUTO W/SCOPE: CPT | Performed by: INTERNAL MEDICINE

## 2019-02-16 RX ORDER — IOPAMIDOL 755 MG/ML
500 INJECTION, SOLUTION INTRAVASCULAR ONCE
Status: COMPLETED | OUTPATIENT
Start: 2019-02-16 | End: 2019-02-16

## 2019-02-16 RX ORDER — GADOBUTROL 604.72 MG/ML
7.5 INJECTION INTRAVENOUS ONCE
Status: COMPLETED | OUTPATIENT
Start: 2019-02-16 | End: 2019-02-16

## 2019-02-16 RX ORDER — ASPIRIN 300 MG/1
300 SUPPOSITORY RECTAL DAILY
Status: DISCONTINUED | OUTPATIENT
Start: 2019-02-16 | End: 2019-02-17

## 2019-02-16 RX ORDER — LABETALOL 20 MG/4 ML (5 MG/ML) INTRAVENOUS SYRINGE
10-40 EVERY 10 MIN PRN
Status: DISCONTINUED | OUTPATIENT
Start: 2019-02-16 | End: 2019-02-19

## 2019-02-16 RX ADMIN — SUCRALFATE 1 G: 1 SUSPENSION ORAL at 07:53

## 2019-02-16 RX ADMIN — Medication 10 MEQ: at 05:40

## 2019-02-16 RX ADMIN — BUMETANIDE 1 MG: 0.5 TABLET ORAL at 21:03

## 2019-02-16 RX ADMIN — SODIUM CHLORIDE 80 ML: 9 INJECTION, SOLUTION INTRAVENOUS at 15:45

## 2019-02-16 RX ADMIN — ATORVASTATIN CALCIUM 20 MG: 20 TABLET, FILM COATED ORAL at 10:21

## 2019-02-16 RX ADMIN — LISINOPRIL 5 MG: 5 TABLET ORAL at 10:21

## 2019-02-16 RX ADMIN — ACETAMINOPHEN 650 MG: 325 TABLET, FILM COATED ORAL at 18:10

## 2019-02-16 RX ADMIN — Medication 10 MEQ: at 04:30

## 2019-02-16 RX ADMIN — Medication 10 MEQ: at 06:42

## 2019-02-16 RX ADMIN — PANTOPRAZOLE SODIUM 40 MG: 40 INJECTION, POWDER, FOR SOLUTION INTRAVENOUS at 21:04

## 2019-02-16 RX ADMIN — APIXABAN 5 MG: 5 TABLET, FILM COATED ORAL at 21:03

## 2019-02-16 RX ADMIN — SUCRALFATE 1 G: 1 SUSPENSION ORAL at 17:49

## 2019-02-16 RX ADMIN — DEXTROSE MONOHYDRATE 50 ML: 500 INJECTION PARENTERAL at 02:13

## 2019-02-16 RX ADMIN — PANTOPRAZOLE SODIUM 40 MG: 40 INJECTION, POWDER, FOR SOLUTION INTRAVENOUS at 07:54

## 2019-02-16 RX ADMIN — AMLODIPINE BESYLATE 10 MG: 10 TABLET ORAL at 10:22

## 2019-02-16 RX ADMIN — ASPIRIN 325 MG: 325 TABLET, DELAYED RELEASE ORAL at 17:49

## 2019-02-16 RX ADMIN — ACETAMINOPHEN 650 MG: 325 TABLET, FILM COATED ORAL at 08:05

## 2019-02-16 RX ADMIN — CARVEDILOL 6.25 MG: 6.25 TABLET, FILM COATED ORAL at 17:49

## 2019-02-16 RX ADMIN — GADOBUTROL 6.5 ML: 604.72 INJECTION INTRAVENOUS at 10:56

## 2019-02-16 RX ADMIN — DEXTROSE MONOHYDRATE 50 ML: 500 INJECTION PARENTERAL at 08:28

## 2019-02-16 RX ADMIN — FAMOTIDINE 20 MG: 20 TABLET ORAL at 07:54

## 2019-02-16 RX ADMIN — BUMETANIDE 2 MG: 2 TABLET ORAL at 10:22

## 2019-02-16 RX ADMIN — CYANOCOBALAMIN TAB 1000 MCG 1000 MCG: 1000 TAB at 10:22

## 2019-02-16 RX ADMIN — Medication 10 MEQ: at 07:52

## 2019-02-16 RX ADMIN — OXYBUTYNIN CHLORIDE 15 MG: 5 TABLET, EXTENDED RELEASE ORAL at 10:21

## 2019-02-16 RX ADMIN — NALOXONE HYDROCHLORIDE 0.4 MG: 0.4 INJECTION, SOLUTION INTRAMUSCULAR; INTRAVENOUS; SUBCUTANEOUS at 02:31

## 2019-02-16 RX ADMIN — IOPAMIDOL 70 ML: 755 INJECTION, SOLUTION INTRAVENOUS at 15:44

## 2019-02-16 ASSESSMENT — ACTIVITIES OF DAILY LIVING (ADL)
ADLS_ACUITY_SCORE: 17
ADLS_ACUITY_SCORE: 16
ADLS_ACUITY_SCORE: 17

## 2019-02-16 NOTE — CONSULTS
[facility]      Stroke Consult Note    Reason for Consult:  Non-emergent consult request for confusion    HPI  Rhonda Mathur is a 79 year old female who presented on 2/11 with stomach discomfort with concern for PUD. She underwent EGD on 2/14 and afterwards has had mental status change. She does have history of Afib and on Eliquis but that was stopped for the procedure. She has been apathetic and not acting herself since the procedure. There is no concern for focal weakness or numbness or speech difficulties. MRI brain was performed which showed scattered bihemispheric ischemic strokes.    Stroke Evaluation summarized:  MRI/Head CT: Scattered small ischemic strokes bilateral hemisphere  Intracranial Vascular Imaging: No significant narrowing  Cervical Carotid and Vertebral Artery Vascular Imaging: Right vert occlusion  Echocardiogram: Pending  EKG/Telemetry: Afib  LDL: 78  A1c: 6.3  Troponin: Negative x 3  Other testing: Not Applicable    Stroke Education  -Patient/family advised regarding healthy diet and regular aerobic exercise.  -Patient/family advised regarding the importance of tobacco cessation and discussed the use of adjunctive medications and nicotine supplementation  -Patient/family advised regarding the signs and symptoms of stroke and that immediate presentation to an ED for evaluation is critical.    PHQ-2 Depression Screening  Over the last 2 weeks, how many days have you noted: Never   (0 points) Several  (1 point) More than half  (2 points) Nearly all  (3 points)   Little interest/pleasure in doing things?    1     Feeling down, depressed, or hopeless?  1       PHQ-2 depression score: 2, consistent with a negative screen for depression.      Impression  Ischemic Stroke due to cardioembolism  Afib    Recommendations  Acute Ischemic Stroke (without tPA) Recommendations  - Neurochecks  - Aim for normotension  - Euthermia, Euglycemia  - Continue Eliquis; Does not need Aspirin from stroke  perspective  - Statin  - TTE with Bubble Study  - Telemetry  - Bedside Glucose Monitoring  - PT/OT/SLP  - PM&R  - Stroke Education    Patient Follow-up    - in 4-6 weeks with local neurologist      Please contact the Stroke Service with any questions.    Frederick Drake MD  Neurology  February 16, 2019    Text Page ([phone])    __________________________________________________    History reviewed. No pertinent past medical history.    Past Surgical History:   Procedure Laterality Date     ESOPHAGOSCOPY, GASTROSCOPY, DUODENOSCOPY (EGD), COMBINED N/A 2/14/2019    Procedure: ESOPHAGOSCOPY, GASTROSCOPY, DUODENOSCOPY;  Surgeon: Alfredo Hawkins MD;  Location:  OR       Medications   Current Facility-Administered Medications   Medication     acetaminophen (TYLENOL) tablet 325-650 mg     albuterol (PROAIR HFA/PROVENTIL HFA/VENTOLIN HFA) 108 (90 Base) MCG/ACT inhaler 2-4 puff     amLODIPine (NORVASC) tablet 10 mg     apixaban ANTICOAGULANT (ELIQUIS) tablet 5 mg     aspirin (ASA) EC tablet 325 mg    Or     aspirin (ASA) Suppository 300 mg     atorvastatin (LIPITOR) tablet 20 mg     bumetanide (BUMEX) tablet 1 mg     bumetanide (BUMEX) tablet 2 mg     carvedilol (COREG) tablet 6.25 mg     glucose gel 15-30 g    Or     dextrose 50 % injection 25-50 mL    Or     glucagon injection 1 mg     famotidine (PEPCID) tablet 20 mg     ibuprofen (ADVIL/MOTRIN) tablet 400 mg     insulin aspart (NovoLOG) inj (RAPID ACTING)     insulin aspart (NovoLOG) inj (RAPID ACTING)     labetalol (NORMODYNE/TRANDATE) syringe 10-40 mg     lidocaine VISCOUS (XYLOCAINE) 2 % 15 mL, alum & mag hydroxide-simethicone (MYLANTA ES/MAALOX  ES) 15 mL GI Cocktail     lisinopril (PRINIVIL/ZESTRIL) tablet 5 mg     magnesium sulfate 4 g in 100 mL sterile water (premade)     May continue current IV fluids if patient has IV fluids infusing.     melatonin tablet 1 mg     naloxone (NARCAN) injection 0.1-0.4 mg     ondansetron (ZOFRAN-ODT) ODT tab 4 mg    Or     ondansetron  (ZOFRAN) injection 4 mg     oxybutynin ER (DITROPAN-XL) 24 hr tablet 15 mg     pantoprazole (PROTONIX) 40 mg IV push injection     Patient is already receiving anticoagulation with heparin, enoxaparin (LOVENOX), warfarin (COUMADIN)  or other anticoagulant medication     potassium chloride (KLOR-CON) Packet 20-40 mEq     potassium chloride 10 mEq in 100 mL intermittent infusion with 10 mg lidocaine     potassium chloride 10 mEq in 100 mL sterile water intermittent infusion (premix)     potassium chloride 20 mEq in 50 mL intermittent infusion     potassium chloride ER (K-DUR/KLOR-CON M) CR tablet 20 mEq     potassium chloride ER (K-DUR/KLOR-CON M) CR tablet 20-40 mEq     senna-docusate (SENOKOT-S/PERICOLACE) 8.6-50 MG per tablet 1 tablet    Or     senna-docusate (SENOKOT-S/PERICOLACE) 8.6-50 MG per tablet 2 tablet     sodium chloride (PF) 0.9% PF flush 3 mL     sucralfate (CARAFATE) suspension 1 g     vitamin B-12 (CYANOCOBALAMIN) tablet 1,000 mcg       Medications Prior to Admission   Medication Sig Dispense Refill Last Dose     acetaminophen (TYLENOL) 325 MG tablet Take 325-650 mg by mouth every 4 hours as needed for mild pain        albuterol (PROAIR HFA/PROVENTIL HFA/VENTOLIN HFA) 108 (90 Base) MCG/ACT inhaler Inhale 2-4 puffs into the lungs every 4 hours as needed for shortness of breath / dyspnea or wheezing        amLODIPine (NORVASC) 10 MG tablet Take 10 mg by mouth daily   2/11/2019 at Unknown time     apixaban ANTICOAGULANT (ELIQUIS) 5 MG tablet Take 5 mg by mouth 2 times daily   2/11/2019 at x1     aspirin 81 MG EC tablet Take 81 mg by mouth daily   2/11/2019 at Unknown time     atorvastatin (LIPITOR) 20 MG tablet Take 20 mg by mouth daily   2/11/2019 at Unknown time     bumetanide (BUMEX) 1 MG tablet Take 2 mg by mouth every morning   2/11/2019 at Unknown time     bumetanide (BUMEX) 1 MG tablet Take 1 mg by mouth In afternoon   2/10/2019 at Unknown time     carvedilol (COREG) 12.5 MG tablet Take 12.5 mg by  mouth 2 times daily (with meals)   2/11/2019 at x1     gabapentin (NEURONTIN) 600 MG tablet Take 600 mg by mouth 2 times daily   2/11/2019 at x1     lisinopril (PRINIVIL/ZESTRIL) 5 MG tablet Take 5 mg by mouth daily   2/11/2019 at Unknown time     metFORMIN (GLUCOPHAGE) 500 MG tablet Take 500 mg by mouth 2 times daily (with meals)   2/11/2019 at x1     nitroGLYcerin (NITROSTAT) 0.4 MG sublingual tablet Place 0.4 mg under the tongue every 5 minutes as needed for chest pain For chest pain place 1 tablet under the tongue every 5 minutes for 3 doses. If symptoms persist 5 minutes after 1st dose call 911.        omeprazole (PRILOSEC) 20 MG DR capsule Take 20 mg by mouth 2 times daily   2/11/2019 at x1     oxybutynin ER (DITROPAN XL) 15 MG 24 hr tablet Take 15 mg by mouth daily   2/11/2019 at Unknown time     oxyCODONE-acetaminophen (PERCOCET) 5-325 MG tablet Take 1 tablet by mouth daily as needed for severe pain        potassium chloride ER (K-DUR/KLOR-CON M) 20 MEQ CR tablet Take 20 mEq by mouth daily   2/11/2019 at Unknown time     vitamin B-12 (CYANOCOBALAMIN) 1000 MCG tablet Take 1,000 mcg by mouth daily   2/11/2019 at Unknown time       Allergies   No Known Allergies    Family History       Social History   Social History     Socioeconomic History     Marital status:      Spouse name: Not on file     Number of children: Not on file     Years of education: Not on file     Highest education level: Not on file   Social Needs     Financial resource strain: Not on file     Food insecurity - worry: Not on file     Food insecurity - inability: Not on file     Transportation needs - medical: Not on file     Transportation needs - non-medical: Not on file   Occupational History     Not on file   Tobacco Use     Smoking status: Not on file   Substance and Sexual Activity     Alcohol use: Not on file     Drug use: Not on file     Sexual activity: Not on file   Other Topics Concern     Not on file   Social History  Narrative     Not on file          ROS  The 10 point Review of Systems is negative other than noted in the HPI or here.     PHYSICAL EXAMINATION  B/P:     186/76 (02/16/19 1612)    Heart Rate: 74 (02/16/19 1612)    Pulse: 68 (02/16/19 1159)   Resp:  17 (02/16/19 1612)  Temp: 97.4  F (36.3  C)   Oral (02/16/19 1612)    General:  patient lying in bed without any acute distress    HEENT:  normocephalic/atraumatic  Cardio:  Irr Irr  Pulmonary:  no respiratory distress  Abdomen:  soft  Extremities:  no edema  Skin:  intact     Neurologic  Mental Status:  fully alert, follows commands, speech clear and fluent; appears apathetic and no attentive  Cranial Nerves:  visual fields intact, PERRL, EOMI with normal smooth pursuit, facial sensation intact and symmetric, facial movements symmetric, no dysarthria  Motor:  strength 5/5 throughout upper and lower extremities  Reflexes:  no clonus  Sensory:  intact/symmetric to light touch and pin prick throughout upper and lower extremities  Coordination:  FNF and HS intact without dysmetria  Station/Gait:  unable to test    Stroke Scales    NIHSS  Interval and Comments baseline   1a. Level of Consciousness 0-->Alert, keenly responsive   1b. LOC Questions 0-->Answers both questions correctly   1c. LOC Commands 0-->Performs both tasks correctly   2.   Best Gaze 0-->Normal   3.   Visual 0-->No visual loss   4.   Facial Palsy 0-->Normal symmetrical movements   5a. Motor Arm, Left 0-->No drift, limb holds 90 (or 45) degrees for full 10 secs   5b. Motor Arm, Right 0-->No drift, limb holds 90 (or 45) degrees for full 10 secs   6a. Motor Leg, Left 0-->No drift, leg holds 30 degree position for full 5 secs   6b. Motor Leg, right 0-->No drift, leg holds 30 degree position for full 5 secs   7.   Limb Ataxia 0-->Absent   8.   Sensory 0-->Normal, no sensory loss   9.   Best Language 0-->No aphasia, normal   10. Dysarthria 0-->Normal   11. Extinction and Inattention  0-->No abnormality   Total 0        Labs/Imaging  Labs and imaging were reviewed and used in developing plan; pertinent results included.  Data   CBC  WBC (10e9/L)   Date Value   02/16/2019 6.5   02/15/2019 7.5   02/14/2019 5.9    RBC Count (10e12/L)   Date Value   02/16/2019 4.41   02/15/2019 4.67   02/14/2019 4.44    Hemoglobin (g/dL)   Date Value   02/16/2019 11.7   02/15/2019 12.4   02/14/2019 11.7      Hematocrit (%)   Date Value   02/16/2019 38.8   02/15/2019 40.7   02/14/2019 38.5    Platelet Count (10e9/L)   Date Value   02/16/2019 213   02/15/2019 235   02/14/2019 237         BMP  Sodium (mmol/L)   Date Value   02/16/2019 134   02/16/2019 135   02/15/2019 138    Potassium (mmol/L)   Date Value   02/16/2019 3.7   02/16/2019 3.3 (L)   02/16/2019 3.2 (L)    Chloride (mmol/L)   Date Value   02/16/2019 97   02/16/2019 97   02/15/2019 98      Carbon Dioxide (mmol/L)   Date Value   02/16/2019 32   02/16/2019 31   02/15/2019 33 (H)    Glucose (mg/dL)   Date Value   02/16/2019 100 (H)   02/16/2019 194 (H)   02/15/2019 93    Urea Nitrogen (mg/dL)   Date Value   02/16/2019 15   02/16/2019 16   02/15/2019 15      Creatinine (mg/dL)   Date Value   02/16/2019 1.22 (H)   02/16/2019 1.21 (H)   02/15/2019 1.21 (H)    Calcium (mg/dL)   Date Value   02/16/2019 8.6   02/16/2019 8.4 (L)   02/15/2019 8.9         INR Troponin I A1C   No results found for: INR Troponin I ES (ug/L)   Date Value   02/15/2019 <0.015   02/15/2019 <0.015   02/15/2019 <0.015    Hemoglobin A1C (%)   Date Value   02/12/2019 6.3 (H)        Liver Panel  Protein Total (g/dL)   Date Value   02/16/2019 6.8   02/11/2019 6.9    Albumin (g/dL)   Date Value   02/16/2019 3.4   02/11/2019 3.7    Bilirubin Total (mg/dL)   Date Value   02/16/2019 0.5   02/11/2019 0.4      Alkaline Phosphatase (U/L)   Date Value   02/16/2019 72   02/11/2019 77    AST (U/L)   Date Value   02/16/2019 17   02/11/2019 18    ALT (U/L)   Date Value   02/16/2019 13   02/11/2019 15      No results found for: BILIDIRECT        Lipid Profile  Cholesterol (mg/dL)   Date Value   02/16/2019 151    HDL Cholesterol (mg/dL)   Date Value   02/16/2019 35 (L)    LDL Cholesterol Calculated (mg/dL)   Date Value   02/16/2019 78      Triglycerides (mg/dL)   Date Value   02/16/2019 189 (H)    No results found for: VINCENT           I have personally spent a total of 50 minutes providing care and consulting with this patient's medical providers today, with more than 50% of this time spent in consultation, coordination of care, and discussion with the patient and/or family regarding diagnostic results, prognosis, symptom management, risks and benefits of management options, and development of plan of care.     Telestroke Service Details  (for non-emergent stroke consult with tele)  Tele service began 02/16/19   1605   Tele service ended 02/16/19   1624   Type of service telemedicine diagnostic assessment of acute neurological changes   Reason telemedicine is appropriate patient requires assessment with a specialist for diagnosis and treatment of neurological symptoms   Mode of transmission secure interactive audio and video communication per [employer]   Originating site (patient location) [facility]    Distant site (provider location) [facility]

## 2019-02-16 NOTE — CONSULTS
[facility]    Neurology Consultation     Date of Admission:  2/11/2019  Date of Consult (When I saw the patient): 02/16/19    Assessment & Plan   Rhonda Mathur is a 79 year old female who was admitted on 2/11/2019. I was asked to see the patient for confusion.  She has been intermittently confused since an EGD was done to evaluate for abdominal pain.  Her daughter reports that she is normally high functioning and relatively independent.  On discussion with her today, she has trouble with following conversations as well as complex commands and basic calculations.  MRI of the brain was done, showing several new infarcts in the right MCA region.    Recommendations:  Contact stroke service through [facility] for further recommendations regarding care going forward.  I will order a CTA of the head and neck as an initial step.    I discussed the above diagnosis, assessment, and further testing with the patient.  Total time: 50 Minutes, with more than 50% of the time counseling the patient or coordination of care.       Maurice Champion MD    Code Status    DNR/DNI        Reason for Consult   Reason for consult: I was asked by Dr. Caicedo to evaluate this patient for confusion.      Chief Complaint   Confusion    History is obtained from the patient and the electronic medical chart.    History of Present Illness   Rhonda Mathur is a 79 year old female who initially presented to the ED with abdominal pain.  As part of her work up she had an EGD which identified several ulcers and duodenitis.  After the procedure she was having increased difficulty with confusion and orientation.  She was evaluated at that time and it was felt to be potentially due to medications associated with the EGD.  The next morning her mental status was improved, however by the next evening she was again confused and disoriented.    She has a history of atrial fibrillation as well as previous strokes.  She had an MRI done today which  showed several new right sided infarcts in the MCA territory.    Past Medical History   I have reviewed this patient's medical history and updated it with pertinent information if needed.   History reviewed. No pertinent past medical history.    Past Surgical History   I have reviewed this patient's surgical history and updated it with pertinent information if needed.  Past Surgical History:   Procedure Laterality Date     ESOPHAGOSCOPY, GASTROSCOPY, DUODENOSCOPY (EGD), COMBINED N/A 2/14/2019    Procedure: ESOPHAGOSCOPY, GASTROSCOPY, DUODENOSCOPY;  Surgeon: Alfredo Hawkins MD;  Location:  OR       Prior to Admission Medications   Prior to Admission Medications   Prescriptions Last Dose Informant Patient Reported? Taking?   acetaminophen (TYLENOL) 325 MG tablet   Yes Yes   Sig: Take 325-650 mg by mouth every 4 hours as needed for mild pain   albuterol (PROAIR HFA/PROVENTIL HFA/VENTOLIN HFA) 108 (90 Base) MCG/ACT inhaler   Yes Yes   Sig: Inhale 2-4 puffs into the lungs every 4 hours as needed for shortness of breath / dyspnea or wheezing   amLODIPine (NORVASC) 10 MG tablet 2/11/2019 at Unknown time  Yes Yes   Sig: Take 10 mg by mouth daily   apixaban ANTICOAGULANT (ELIQUIS) 5 MG tablet 2/11/2019 at x1  Yes Yes   Sig: Take 5 mg by mouth 2 times daily   aspirin 81 MG EC tablet 2/11/2019 at Unknown time  Yes Yes   Sig: Take 81 mg by mouth daily   atorvastatin (LIPITOR) 20 MG tablet 2/11/2019 at Unknown time  Yes Yes   Sig: Take 20 mg by mouth daily   bumetanide (BUMEX) 1 MG tablet 2/11/2019 at Unknown time  Yes Yes   Sig: Take 2 mg by mouth every morning   bumetanide (BUMEX) 1 MG tablet 2/10/2019 at Unknown time  Yes Yes   Sig: Take 1 mg by mouth In afternoon   carvedilol (COREG) 12.5 MG tablet 2/11/2019 at x1  Yes Yes   Sig: Take 12.5 mg by mouth 2 times daily (with meals)   gabapentin (NEURONTIN) 600 MG tablet 2/11/2019 at x1  Yes Yes   Sig: Take 600 mg by mouth 2 times daily   lisinopril (PRINIVIL/ZESTRIL) 5 MG  tablet 2/11/2019 at Unknown time  Yes Yes   Sig: Take 5 mg by mouth daily   metFORMIN (GLUCOPHAGE) 500 MG tablet 2/11/2019 at x1  Yes Yes   Sig: Take 500 mg by mouth 2 times daily (with meals)   nitroGLYcerin (NITROSTAT) 0.4 MG sublingual tablet   Yes Yes   Sig: Place 0.4 mg under the tongue every 5 minutes as needed for chest pain For chest pain place 1 tablet under the tongue every 5 minutes for 3 doses. If symptoms persist 5 minutes after 1st dose call 911.   omeprazole (PRILOSEC) 20 MG DR capsule 2/11/2019 at x1  Yes Yes   Sig: Take 20 mg by mouth 2 times daily   oxyCODONE-acetaminophen (PERCOCET) 5-325 MG tablet   Yes Yes   Sig: Take 1 tablet by mouth daily as needed for severe pain   oxybutynin ER (DITROPAN XL) 15 MG 24 hr tablet 2/11/2019 at Unknown time  Yes Yes   Sig: Take 15 mg by mouth daily   potassium chloride ER (K-DUR/KLOR-CON M) 20 MEQ CR tablet 2/11/2019 at Unknown time  Yes Yes   Sig: Take 20 mEq by mouth daily   vitamin B-12 (CYANOCOBALAMIN) 1000 MCG tablet 2/11/2019 at Unknown time  Yes Yes   Sig: Take 1,000 mcg by mouth daily      Facility-Administered Medications: None     Allergies   No Known Allergies    Review of Systems   The 10 point Review of Systems is negative other than noted in the HPI or here.     Physical Exam   Temp: 98.4  F (36.9  C) Temp src: Oral BP: 107/51 Pulse: 68 Heart Rate: 63 Resp: 16 SpO2: 95 % O2 Device: Nasal cannula Oxygen Delivery: 2 LPM  Vital Signs with Ranges  Temp:  [97.6  F (36.4  C)-99.8  F (37.7  C)] 98.4  F (36.9  C)  Pulse:  [60-68] 68  Heart Rate:  [54-74] 63  Resp:  [12-20] 16  BP: (101-169)/(47-63) 107/51  SpO2:  [94 %-100 %] 95 %  156 lbs 0 oz    General Appearance:  No acute distress  Neuro:       Mental Status Exam:    Awake, alert, oriented x3.  She has some difficulty with complex commands, calculations.  No obvious aphasia however.       Cranial Nerves:   Left facial droop (daugher notes a history of left facial droop and slurred speech  intermittently since previous stroke)           Motor:   Moves all extremities, left sided drift           Reflexes:  2+, symmetric       Sensory:  Grossly intact                   Coordination:   Grossly intact       Gait:  Deferred     CV: Irregular rate and rhythm      Data   Results for orders placed or performed during the hospital encounter of 02/11/19 (from the past 24 hour(s))   Glucose by meter   Result Value Ref Range    Glucose 89 70 - 99 mg/dL   Troponin I   Result Value Ref Range    Troponin I ES <0.015 0.000 - 0.045 ug/L   Glucose by meter   Result Value Ref Range    Glucose 87 70 - 99 mg/dL   Troponin I   Result Value Ref Range    Troponin I ES <0.015 0.000 - 0.045 ug/L   Glucose by meter   Result Value Ref Range    Glucose 102 (H) 70 - 99 mg/dL   Glucose by meter   Result Value Ref Range    Glucose 83 70 - 99 mg/dL   Glucose by meter   Result Value Ref Range    Glucose 66 (L) 70 - 99 mg/dL   Glucose by meter   Result Value Ref Range    Glucose 266 (H) 70 - 99 mg/dL   EKG 12-lead, tracing only   Result Value Ref Range    Interpretation ECG Click View Image link to view waveform and result    Blood gas arterial with oxyhemoglobin   Result Value Ref Range    pH Arterial 7.43 7.35 - 7.45 pH    pCO2 Arterial 50 (H) 35 - 45 mm Hg    pO2 Arterial 76 (L) 80 - 105 mm Hg    Bicarbonate Arterial 33 (H) 21 - 28 mmol/L    FIO2 2     Oxyhemoglobin Arterial 94 92 - 100 %    Base Excess Art 7.6 mmol/L   Basic metabolic panel   Result Value Ref Range    Sodium 135 133 - 144 mmol/L    Potassium 3.2 (L) 3.4 - 5.3 mmol/L    Chloride 97 94 - 109 mmol/L    Carbon Dioxide 31 20 - 32 mmol/L    Anion Gap 7 3 - 14 mmol/L    Glucose 194 (H) 70 - 99 mg/dL    Urea Nitrogen 16 7 - 30 mg/dL    Creatinine 1.21 (H) 0.52 - 1.04 mg/dL    GFR Estimate 42 (L) >60 mL/min/[1.73_m2]    GFR Estimate If Black 49 (L) >60 mL/min/[1.73_m2]    Calcium 8.4 (L) 8.5 - 10.1 mg/dL   Ammonia   Result Value Ref Range    Ammonia 17 10 - 50 umol/L   CBC  with platelets differential   Result Value Ref Range    WBC 6.5 4.0 - 11.0 10e9/L    RBC Count 4.41 3.8 - 5.2 10e12/L    Hemoglobin 11.7 11.7 - 15.7 g/dL    Hematocrit 38.8 35.0 - 47.0 %    MCV 88 78 - 100 fl    MCH 26.5 26.5 - 33.0 pg    MCHC 30.2 (L) 31.5 - 36.5 g/dL    RDW 15.6 (H) 10.0 - 15.0 %    Platelet Count 213 150 - 450 10e9/L    Diff Method Automated Method     % Neutrophils 60.0 %    % Lymphocytes 25.7 %    % Monocytes 12.6 %    % Eosinophils 0.9 %    % Basophils 0.3 %    % Immature Granulocytes 0.5 %    Nucleated RBCs 0 0 /100    Absolute Neutrophil 3.9 1.6 - 8.3 10e9/L    Absolute Lymphocytes 1.7 0.8 - 5.3 10e9/L    Absolute Monocytes 0.8 0.0 - 1.3 10e9/L    Absolute Eosinophils 0.1 0.0 - 0.7 10e9/L    Absolute Basophils 0.0 0.0 - 0.2 10e9/L    Abs Immature Granulocytes 0.0 0 - 0.4 10e9/L    Absolute Nucleated RBC 0.0    Lactic acid whole blood   Result Value Ref Range    Lactic Acid 0.9 0.7 - 2.0 mmol/L   Glucose by meter   Result Value Ref Range    Glucose 175 (H) 70 - 99 mg/dL   UA with Microscopic reflex to Culture   Result Value Ref Range    Color Urine Straw     Appearance Urine Clear     Glucose Urine Negative NEG^Negative mg/dL    Bilirubin Urine Negative NEG^Negative    Ketones Urine Negative NEG^Negative mg/dL    Specific Gravity Urine 1.003 1.003 - 1.035    Blood Urine Negative NEG^Negative    pH Urine 6.0 5.0 - 7.0 pH    Protein Albumin Urine Negative NEG^Negative mg/dL    Urobilinogen mg/dL 0.0 0.0 - 2.0 mg/dL    Nitrite Urine Negative NEG^Negative    Leukocyte Esterase Urine Negative NEG^Negative    Source Catheterized Urine     WBC Urine <1 0 - 5 /HPF    RBC Urine 0 0 - 2 /HPF   XR Chest Port 1 View    Narrative    XR CHEST PORT 1 VW  2/16/2019 4:37 AM     HISTORY: Mild hypoxia now with encephalopathy. Evaluate for occult  infiltrate.    COMPARISON: None.    FINDINGS: Semiupright portable chest. The heart is enlarged. There is  no pulmonary edema. The thoracic aorta is calcified. The  lungs are  clear. No pneumothorax.      Impression    IMPRESSION: No acute abnormality.   Glucose by meter   Result Value Ref Range    Glucose 99 70 - 99 mg/dL   Basic metabolic panel   Result Value Ref Range    Sodium 134 133 - 144 mmol/L    Potassium 3.3 (L) 3.4 - 5.3 mmol/L    Chloride 97 94 - 109 mmol/L    Carbon Dioxide 32 20 - 32 mmol/L    Anion Gap 5 3 - 14 mmol/L    Glucose 100 (H) 70 - 99 mg/dL    Urea Nitrogen 15 7 - 30 mg/dL    Creatinine 1.22 (H) 0.52 - 1.04 mg/dL    GFR Estimate 42 (L) >60 mL/min/[1.73_m2]    GFR Estimate If Black 48 (L) >60 mL/min/[1.73_m2]    Calcium 8.6 8.5 - 10.1 mg/dL   TSH with free T4 reflex   Result Value Ref Range    TSH 1.50 0.40 - 4.00 mU/L   Cortisol   Result Value Ref Range    Cortisol Serum 17.4 4 - 22 ug/dL   Glucose by meter   Result Value Ref Range    Glucose 27 (LL) 70 - 99 mg/dL   Glucose by meter   Result Value Ref Range    Glucose 243 (H) 70 - 99 mg/dL   MR Brain w/o & w Contrast    Narrative    MRI BRAIN WITHOUT AND WITH CONTRAST  2/16/2019 11:20 AM    HISTORY:  Altered level of consciousness (LOC), unexplained     TECHNIQUE:  Multiplanar, multisequence MRI of the brain without and  with 6.5mL Gadavist    COMPARISON: CT scan dated 2/15/2018    FINDINGS:  There is diffuse parenchymal volume loss.  White matter  changes are present in the cerebral hemispheres that are consistent  with small vessel ischemic disease in this age patient. There are  multiple small areas of restricted diffusion in the right hemisphere  including lesions in the right frontal lobe and right parietal lobe.  There is also a small area of restricted diffusion in the right  periventricular white matter. These multiple small areas of restricted  diffusion are consistent with an embolic process in the right middle  cerebral artery distribution.  There is no evidence of hemorrhage,  mass, acute infarct, or anomaly.  There are no gadolinium enhancing  lesions.   The facial structures appear normal.  The arteries at the  base of the brain and the dural venous sinuses appear patent.       Impression    IMPRESSION:  Multiple small areas of restricted diffusion in the right  hemisphere. These are consistent with recent infarcts. There are  compatible with an embolic process primarily in the right middle  cerebral artery distribution.     Potassium   Result Value Ref Range    Potassium 3.7 3.4 - 5.3 mmol/L   Glucose by meter   Result Value Ref Range    Glucose 106 (H) 70 - 99 mg/dL           Imaging and procedures:  I personally reviewed these images.

## 2019-02-16 NOTE — PROGRESS NOTES
"Rapid Response Note:    Rapid response was called overhead as the patient was noted to be essentially unresponsive.  On my arrival she was lying in bed.  Vitals had already been checked and she was vitally stable.  Notably, blood glucose was 66 when checked and she was given 1 amp of D50 with rise in blood sugars.    On chart review she had a similar episode earlier today during which code stroke was called.  She had a negative CT head.  The episode was felt possibly to be related to sedating medications given for her recent endoscopy.  She did clear appropriately.    /51   Pulse 57   Temp 98  F (36.7  C) (Oral)   Resp 18   Ht 1.676 m (5' 6\")   Wt 70.8 kg (156 lb)   SpO2 97%   BMI 25.18 kg/m    On my exam she is lying in bed with eyes mostly closed.  She would intermittently move her head and respond to loud voice.  Minimally following commands.  Heart was somewhat irregular with rate in the 50s.  Lungs clear to auscultation.  Normal work of breathing.  No wheezing.  On deep palpation of her entire abdomen her abdomen was soft with a benign exam.  No visible pain response at all.  Extremities were warm and well perfused.  No clonus or tremor.    A/P: Encephalopathy.  Cause unclear.  Consider toxic metabolic, occult infection among others.  --Very recent head CT negative and no focal neurologic findings now so we will not repeat that.    --Blood sugar seems 1 likely culprit though her response for a blood sugar of 66 seems somewhat out of proportion.    --No seizure activity but given recurrent episodes will check an EEG in the morning.    --check TSH and cortisol  --No fevers but will check a urine analysis to evaluate for occult infection as well as a pro-calcitonin.    --She is mildly hypoxic as well so we will check a chest x-ray to rule out infiltrate.    --Lab work was sent and she is reassuringly noted to have a normal lactate, relatively benign-appearing basic metabolic panel notable really only " for mild hypokalemia and stable CBC.  Arterial blood gas shows mild elevation in CO2 but overall quite compensated.  --She has been noted to have some cardiac pauses earlier in the day.  EKG was obtained and shows right bundle branch block but with a normal rate.  Will monitor on telemetry moving forward for any more significant episodes of bradycardia.  --Consider neurology consult in the morning  --Defer to rounding regarding ongoing workup but currently she appears hemodynamically stable and given that she recently had an episode I would favor monitoring closely for clearing and expanding her workup as above.  --Also, 0.4 Narcan given without significant effect.    Jamie Almanza MD    Evaluation and management time exclusive of procedures was 35 minutes critical care time including: urgent examination and evaluation of the patient, discussion of the patient's condition with other physicians and members of the care team, reviewing data and chart related to the patient, discussion of patient's condition with the family and time utilizing the EMR for documentation of this patient's care.

## 2019-02-16 NOTE — PROGRESS NOTES
[facility]  Hospitalist Progress Note  Rony Caicedo MD 02/16/2019    Reason for Stay (Diagnosis): Abdominal pain         Assessment and Plan:      Summary of Stay: Rhonda Mathur is a 79 year old female with multiple medical history such as chronic atrial fibrillation on oral anticoagulation, CAD, chronic heart failure with preserved ejection fraction, hypertension, diabetes mellitus non-insulin-requiring, dyslipidemia, prior anemia, currently living independently at home and presented in the emergency room mostly due to increasing episodes of abdominal discomfort and pain leading to decreased oral intake at least for the past 1-2 days.      Problem List:   1. Abdominal pain: GI consulted and endoscopy results reviewed.  Patient has peptic ulcer disease.  Continue PPI, appreciate GI input.  No NSAIDs.  Follow biopsy results and H. pylori testing  2. Hypokalemia: Replace per protocol  3. CHF: Stable  4. Type 2 diabetes: Blood sugar controlled  5. Hypertension, controlled  6. Generalized weakness: PT OT and discharge planning  7. Frequent cardiac pauses: Patient is asymptomatic without hypotension or chest pain.  Care discussed with cardiology team.  Plan is to decrease beta-blocker and monitor patient.  Continue telemetry monitoring and cardiology consultation obtained   8. AMS -acute encephalopathy on and off since EGD.  And RRT was noted last night.  Mental status much better this morning.  Blood sugar was 27 at one point in his was treated with action.  I obtained MRI of the brain which showed evidence small multiple areas of restricted diffusion in the right hemisphere consistent with recent infarct.  Neurology was consulted for encephalopathy earlier and recommending telemetry stroke team to be contacted and be consulted.  I consulted telemetry stroke team and entered stroke workup including echocardiogram, continue to monitor on telemetry   --Patient is stable, continue physical therapy and  "occupational therapy.     DVT Prophylaxis: Pneumatic Compression Devices    Code Status: DNR / DNI  Discharge Dispo: Home versus TCU  Estimated Disch Date / # of Days until Disch: Continue to monitor patient as she has evidence of stroke.  Patient may need to go to acute rehab on discharge.  Family will be notified.    Addendum  I spoke with GI physician Dr. Conde saw patient earlier.  Risk of anticoagulation discussed in Dr. Conde recommended that she can continue antiplatelets and anticoagulation with twice daily PPI if needed.  Tele stroke neurologist paged and will discuss with him as well.  Patient daughter Brianne was called and patient condition updated.        Interval History (Subjective):                Patient was seen and examined by me this morning.  Events from last night noted.  Patient had episode of encephalopathy and unresponsiveness and rapid response was called.  Patient blood sugar was low this morning at 27 dextrose injection.  Currently she is alert and oriented x3.  Her daughter Brianne is at bedside.  She is doing well this morning.  Etiology of her encephalopathy is still not clear as she was not hypoglycemic on other episodes of unresponsiveness  Plan is to get EEG, MRI of the brain, get neurologist consultation to assist with further management recommendations.  Addendum  Patient had MRI which was significant for acute/subacute stroke.  Patient was seen by neurology service.           Physical Exam:      Last Vital Signs:  /51 (BP Location: Right arm)   Pulse 68   Temp 98.4  F (36.9  C) (Oral)   Resp 16   Ht 1.676 m (5' 6\")   Wt 70.8 kg (156 lb)   SpO2 95%   BMI 25.18 kg/m        Intake/Output Summary (Last 24 hours) at 2/12/2019 1531  Last data filed at 2/12/2019 1500  Gross per 24 hour   Intake 400 ml   Output 800 ml   Net -400 ml       Constitutional:  Alert and oriented x3 but she is very slow   Respiratory: Clear to auscultation bilaterally, no crackles or wheezing "   Cardiovascular: Regular rate and rhythm, normal S1 and S2, and no murmur noted   Abdomen: Normal bowel sounds, soft, non-distended, non-tender   Skin: No rashes, no cyanosis, dry to touch   Neuro: , non focal    Extremities: No edema, normal range of motion   Other(s):        All other systems: Negative          Medications:      All current medications were reviewed with changes reflected in problem list.         Data:      All new lab and imaging data was reviewed.   Labs:  All laboratory and imaging data in the past 24 hours reviewed     Recent Labs   Lab 02/16/19  0233 02/15/19  1009 02/14/19  0735   WBC 6.5 7.5 5.9   HGB 11.7 12.4 11.7   HCT 38.8 40.7 38.5   MCV 88 87 87    235 237     No results for input(s): CULT in the last 168 hours.  Recent Labs   Lab 02/16/19  1143 02/16/19  0547 02/16/19  0233 02/15/19  1009 02/14/19  0735  02/12/19  0111  02/11/19  1917   NA  --  134 135 138 137   < >  --   --  138   POTASSIUM 3.7 3.3* 3.2* 3.7 3.6   < > 3.0*  --  2.7*   CHLORIDE  --  97 97 98 99   < >  --   --  96   CO2  --  32 31 33* 33*   < >  --   --  33*   ANIONGAP  --  5 7 7 5   < >  --   --  9   GLC  --  100* 194* 93 90   < >  --   --  107*   BUN  --  15 16 15 16   < >  --   --  14   CR  --  1.22* 1.21* 1.21* 1.15*   < >  --   --  1.01   GFRESTIMATED  --  42* 42* 42* 45*   < >  --    < > 52*   GFRESTBLACK  --  48* 49* 49* 52*   < >  --    < > 60*   CONRADO  --  8.6 8.4* 8.9 8.7   < >  --   --  8.4*   MAG  --   --   --   --  1.6  --  2.0  --   --    PROTTOTAL  --   --   --   --   --   --   --   --  6.9   ALBUMIN  --   --   --   --   --   --   --   --  3.7   BILITOTAL  --   --   --   --   --   --   --   --  0.4   ALKPHOS  --   --   --   --   --   --   --   --  77   AST  --   --   --   --   --   --   --   --  18   ALT  --   --   --   --   --   --   --   --  15    < > = values in this interval not displayed.       Recent Labs   Lab 02/16/19  1154 02/16/19  0847 02/16/19  0824 02/16/19  0547 02/16/19  0455  02/16/19  0248 02/16/19  0233  02/15/19  1009  02/14/19  0735  02/12/19  0734   GLC  --   --   --  100*  --   --  194*  --  93  --  90  --  99   * 243* 27*  --  99 175*  --    < >  --    < >  --    < >  --     < > = values in this interval not displayed.           No results for input(s): INR in the last 168 hours.        Recent Labs   Lab 02/15/19  1750 02/15/19  1351 02/15/19  1009   TROPI <0.015 <0.015 <0.015       Recent Results (from the past 48 hour(s))   Abd/pelvis CT,  IV  contrast only TRAUMA / AAA    Narrative    CT ABDOMEN AND PELVIS WITH CONTRAST   2/11/2019 7:45 PM     HISTORY: Epigastric abdominal pain radiating to back.    TECHNIQUE:  CT abdomen and pelvis with 81 mL [id number] IV. Radiation  dose for this scan was reduced using automated exposure control,  adjustment of the mA and/or kV according to patient size, or iterative  reconstruction technique.    COMPARISON: None.    FINDINGS:  Abdomen: The lung bases are unremarkable. The heart is enlarged. The  gallbladder is absent. The liver, spleen, pancreas, adrenal glands are  normal in appearance. There are several small low-attenuation cortical  lesions in the kidneys bilaterally which are likely cysts. There is  mild right renal atrophy when compared to the left. No hydronephrosis.  No abdominal or pelvic lymph node enlargement. There is  atherosclerotic calcification of aorta and its branches. No aneurysm.    Pelvis: There is no bowel obstruction or inflammation. No free  intraperitoneal gas or fluid. Uterus and adnexa appear grossly normal.  There are postoperative changes in the anterior abdominal wall.  Degenerative disease in the spine.      Impression    IMPRESSION:  1. No acute abnormality. No bowel obstruction or inflammation.  2. Cardiomegaly.    MEERA DAMIAN MD

## 2019-02-16 NOTE — PLAN OF CARE
Tele-Afib CVR HR 60 with BBB and ST depression and prolonged QT.  PT came to floor at 0405 from 6th floor and was unrespsonsive.  Chest Xray is pending.  EEG for AM.  Pt has frequent pulse rate drop below 50 then pops right back up over 50.  BG 99.  Potassium is infusing for potassium of 3.3.  Q4hr neuro checks.  Reg diet.  Daughter at bedside.  Pt did wake up and stated she was cold so gave her heated blankets.  Pt also said she saw smoke then went back to sleep.  Discharge possible when mental status improves.  Continue plan of care.

## 2019-02-16 NOTE — PROGRESS NOTES
Patient was seen and examined by me this morning.  Events from last night noted.  Patient had episode of encephalopathy and unresponsiveness and rapid response was called.  Patient blood sugar was low this morning at 27 dextrose injection.  Currently she is alert and oriented x3.  Her daughter Brianne is at bedside.  She is doing well this morning.  Etiology of her encephalopathy is still not clear as she was not hypoglycemic on other episodes of unresponsiveness  Plan is to get EEG, MRI of the brain, get neurologist consultation to assist with further management recommendations.

## 2019-02-16 NOTE — PLAN OF CARE
VS-99.8 orally, encouraged IS, ambulation, cdb.   Lung Sounds-clear, no cough, on room air, using IS upto 500-750. Needs encouragement to use.   O2-on room air.   GI-+bs, +flatus, lbm was 2/11 or 2/12--pt can't remember.... Did not want to take stool softner--had mashed potatoes for supper and she thought that would work for her. On reg diet. Bs 87 and after eating it was 102. At HS bs was 83--pt did not want to eat/drink anything. Gave a chocolate.   -voided x 1--missed hat completely, bladder scanned afterwards 566. Pt retried to void. Voided 400cc emely urine  IVF-sl  Dressings-none  CMS-has numbness and tingling in bilateral legs from knee down to toes, baseline from diabetes, mod dorsiplanter flexion. Scds on at night. +pp, neuro checks intact. Generalized weakness.   Drain-none  Activity-up with 1-2 assist and walker, unsteady when up, gait belt and walker needed. One assist to the bathroom, 2 assist in the hallway.   Pain-rates pain level a 5 down to a 0 after one percocet this shift.   D/C Plan-sws following along with PT/OT. Pt recommending ARF. Will reassess in am.   K 3.7, stool sample still needed.

## 2019-02-16 NOTE — PROGRESS NOTES
02/16/19 1300   General Information   Onset Date 02/11/19   Start of Care Date 02/16/19   Referring Physician Dr. Caicedo   Patient Profile Review/OT: Additional Occupational Profile Info See Profile for full history and prior level of function   Patient/Family Goals Statement None stated   Swallowing Evaluation Bedside swallow evaluation   Behavorial Observations WFL (within functional limits)   Respiratory Status Room air   Comments Pt was admitted 2/11/19 with abdominal pain and discomfort, resulting in decreased oral intake for 1-2 days PTA. Pt developed acute encephalopathy during admission. EGD on 2/14 showed gastric ulcers and duodenitis. Pt endorses ongoing stomach pain, but denies regurgitation of food. She is on a softer diet at baseline (as this is her preference).   Clinical Swallow Evaluation   Oral Musculature generally intact   Structural Abnormalities none present   Dentition upper and lower dentures   Mucosal Quality good   Mandibular Strength and Mobility impaired   Oral Labial Strength and Mobility impaired retraction;impaired seal;impaired pursing   Lingual Strength and Mobility impaired protrusion;impaired coordination   Velar Elevation other (see comments)  (Pt unable to open her mouth widely enough to visualize)   Buccal Strength and Mobility impaired   Laryngeal Function Throat clear;Swallow;Voicing initiated   Oral Musculature Comments Diffuse weakness   Additional Documentation Yes   Clinical Swallow Eval: Thin Liquid Texture Trial   Mode of Presentation, Thin Liquids self-fed;cup   Volume of Liquid or Food Presented 5 cup sips   Oral Phase of Swallow WFL   Pharyngeal Phase of Swallow reduction in laryngeal movement;impaired;repeated swallows   Diagnostic Statement No signs of penetration/aspiration. Delay in initiation of pharyngeal swallow.   Clinical Swallow Eval: Puree Solid Texture Trial   Mode of Presentation, Puree fed by clinician   Volume of Puree Presented 3 tsps of applesauce    Oral Phase, Puree WFL   Pharyngeal Phase, Puree impaired;wet vocal quality after swallow;repeated swallows;reduction in laryngeal movement   Diagnostic Statement Wet vocal quality noted 1x and cleared with cued throat clear. Delay in initiation of pharyngeal.    Clinical Swallow Eval: Solid Food Texture Trial   Mode of Presentation, Solid self-fed   Volume of Solid Food Presented 1 bite of huber cracker   Oral Phase, Solid other (see comments)  (prolonged mastication)   Pharyngeal Phase, Solid impaired;reduction in laryngeal movement;repeated swallows   Diagnostic Statement No signs of penetration/aspiration. Prolonged mastication.   General Therapy Interventions   Planned Therapy Interventions Dysphagia Treatment   Dysphagia treatment Oropharyngeal exercise training;Modified diet education;Instruction of safe swallow strategies   Swallow Eval: Clinical Impressions   Skilled Criteria for Therapy Intervention Skilled criteria met.  Treatment indicated.   Functional Assessment Scale (FAS) 5   Treatment Diagnosis Mild oropharyngeal dysphagia   Diet texture recommendations Dysphagia diet level 2;Thin liquids   Recommended Feeding/Eating Techniques maintain upright posture during/after eating for 30 mins;small sips/bites   Therapy Frequency 3 times/wk   Predicted Duration of Therapy Intervention (days/wks) 1 week   Risks and Benefits of Treatment have been explained. Yes   Patient, family and/or staff in agreement with Plan of Care Yes   Clinical Impression Comments Pt was seen for clinical swallow evaluation per MD order. Pt presents with mild oropharyngeal dysphagia characterized by diffuse oral weakness, delayed initiation of pharyngeal swallow, reduced hyolaryngeal excursion, pharyngeal inefficiency, and reduced airway protection. Pt was self-selecting soft foods at baseline, so it is unclear if this is dysphagia is chronic or acute in onset. RECOMMEND: Downgrade to NDD-II diet with thin liquids. Safe swallowing  strategies include upright for all PO; small bites/sips; one bite/sip at a time; eat smaller, more frequent meals; remain upright for 60 minutes after meals. Continue with any GI restrictions related to ulcers in terms of food selections. Speech will follow during admission to address impairments outlined above.    Total Evaluation Time   Total Evaluation Time (Minutes) 20

## 2019-02-16 NOTE — PLAN OF CARE
Pt A&Ox3, disorientated to situation, upon first rounds pt very lethargic, BG found to be 27, amp of D5 given, recheck 243, noon check 106, MRI and CT done, neuro to consult, tele-stroke initiated, potassium replaced and recheck was 3.7, PT/OT/SLP consulted - diet changed to DD2 w/thins, tele afib, will continue POC.

## 2019-02-16 NOTE — PROGRESS NOTES
RT- per phone conversation with RN, Dr Champion stated that the ordered EEG did not need to be performed.    Natalie Mix, RT  2/16/2019 2:28 PM

## 2019-02-16 NOTE — PLAN OF CARE
Pt had episode of unresponsiveness.  Unable to arouse or answer questions. Called RRT. JAYLAN REYES. Blood sugar was 66, gave D50. Recheck was 266. Straight cath for 800. UA sent , results were negative. Plan is to transfer to 3rd floor cardiac unit for better monitoring, EEG in morning and consult neurology. Updated daughter Fawn on the phone and in person when she arrived.

## 2019-02-16 NOTE — PLAN OF CARE
Discharge Planner PT   Patient plan for discharge: not stated  Current status:     Pt supine upon arrival, MRI completed showing CVA see med chart. Pt alert and oriented x 3, supine > sitting EOB with SBA and use of bed rail. good midline sitting balance. STS with FWW and close CGA, instability noted upon standing similar to previous session. Pt ambulated with FWW 20', decreased chase and L ft clearance compared to previous session. Pt reported feeling nauseated. Assited into chair with CGA. RN present. STS from chair with min A x 1 and use of FWW. Assisted with CGA and FWW to [facility]. Sit > supine with min A x 1 to elevate LE.  Barriers to return to prior living situation: A x 1, LLE weakness, inc time for motor planning, impaired balance and coordination   Recommendations for discharge: ARU  Rationale for recommendations: Pt would benefit from ARU stay for intense therapy as pt previously IND, now with impaired strength, balance, cognition requiring assist for all mobility          Entered by: Silvia Ovalle 02/16/2019 3:36 PM

## 2019-02-16 NOTE — PLAN OF CARE
Discharge Planner OT   Patient plan for discharge: None stated  Current status: Pt supine in bed upon OT arrival; pt agreeable to therapy. Pt completed bed mobility supine <> sit with mod I and bed rail. Pt completed sit <> stand transfer with mod I, FWW, and verbal cues from th for safe use of FWW. Pt ambulated from EOB to bathroom with min A for correction of LOB x2. Pt completed toilet transfer with mod I, grab bars, and FWW. Pt completing toileting tasks I'ly. Pt completed bed mobility sit <> supine with min A for management of BLE. Pt demo'd increased confusion during OT session and required repeated verbal directions and tactile cues during functional task engagement. Session ended with pt supine in bed. All needs in reach; bed alarm on.   Barriers to return to prior living situation: Increased confusion; decreased safety awareness; decreased ind with  I/ADL's  Recommendations for discharge: ARU  Rationale for recommendations: Per angelia review, pt ind at baseline. Pt would benefit from continued skilled OT services for increased strength, balance, safety, and ind with I/ADL's.        Entered by: Charline Tolbert 02/16/2019 2:33 PM

## 2019-02-16 NOTE — PROGRESS NOTES
Arterial blood gas drawn from left radial site. Pt is on 2L nasal canula. Compression was held at site for 5 minutes. No complications were encountered. Sample has been labeled and sent to lab.

## 2019-02-16 NOTE — PLAN OF CARE
Discharge Planner SLP   Patient plan for discharge: None stated  Current status: Pt was seen for clinical swallow evaluation per MD order. Pt presents with mild oropharyngeal dysphagia characterized by diffuse oral weakness, delayed initiation of pharyngeal swallow, reduced hyolaryngeal excursion, pharyngeal inefficiency, and reduced airway protection. Pt was self-selecting soft foods at baseline, so it is unclear if this is dysphagia is chronic or acute in onset. RECOMMEND: Downgrade to NDD-II diet with thin liquids. Safe swallowing strategies include upright for all PO; small bites/sips; one bite/sip at a time; eat smaller, more frequent meals; remain upright for 60 minutes after meals. Continue with any GI restrictions related to ulcers in terms of food selections. Speech will follow during admission to address impairments outlined above.   Barriers to return to prior living situation: Fatigue, weakness  Recommendations for discharge: Ongoing dysphagia intervention at the next level of care  Rationale for recommendations: To maximize swallow safety and function       Entered by: Denise Mock 02/16/2019 1:55 PM

## 2019-02-16 NOTE — PROGRESS NOTES
Brief GI note:    Overnight:   Patient with decreased responsiveness  MRI shows embolic like stroke    GI Problem List:  1. Gastritis, PUD  2. Delayed gastric emptying, may be related to narcotics    Plan:  - follow up pathology  - PPI indefinitely    Given events of past 24 hours, GI will sign off. Please call with questions or concerns.     Raya Guzman MD on 2/16/2019 at 12:53 PM   Cell [phone]

## 2019-02-17 ENCOUNTER — APPOINTMENT (OUTPATIENT)
Dept: OCCUPATIONAL THERAPY | Facility: CLINIC | Age: 79
DRG: 383 | End: 2019-02-17
Attending: INTERNAL MEDICINE
Payer: MEDICARE

## 2019-02-17 ENCOUNTER — APPOINTMENT (OUTPATIENT)
Dept: CARDIOLOGY | Facility: CLINIC | Age: 79
DRG: 383 | End: 2019-02-17
Attending: INTERNAL MEDICINE
Payer: MEDICARE

## 2019-02-17 ENCOUNTER — APPOINTMENT (OUTPATIENT)
Dept: PHYSICAL THERAPY | Facility: CLINIC | Age: 79
DRG: 383 | End: 2019-02-17
Payer: MEDICARE

## 2019-02-17 LAB
ANION GAP SERPL CALCULATED.3IONS-SCNC: 7 MMOL/L (ref 3–14)
BUN SERPL-MCNC: 17 MG/DL (ref 7–30)
CALCIUM SERPL-MCNC: 9.1 MG/DL (ref 8.5–10.1)
CHLORIDE SERPL-SCNC: 98 MMOL/L (ref 94–109)
CO2 SERPL-SCNC: 32 MMOL/L (ref 20–32)
CREAT SERPL-MCNC: 1.23 MG/DL (ref 0.52–1.04)
ERYTHROCYTE [DISTWIDTH] IN BLOOD BY AUTOMATED COUNT: 15.4 % (ref 10–15)
GFR SERPL CREATININE-BSD FRML MDRD: 41 ML/MIN/{1.73_M2}
GLUCOSE BLDC GLUCOMTR-MCNC: 120 MG/DL (ref 70–99)
GLUCOSE BLDC GLUCOMTR-MCNC: 81 MG/DL (ref 70–99)
GLUCOSE BLDC GLUCOMTR-MCNC: 83 MG/DL (ref 70–99)
GLUCOSE BLDC GLUCOMTR-MCNC: 98 MG/DL (ref 70–99)
GLUCOSE BLDC GLUCOMTR-MCNC: 98 MG/DL (ref 70–99)
GLUCOSE BLDC GLUCOMTR-MCNC: 99 MG/DL (ref 70–99)
GLUCOSE SERPL-MCNC: 84 MG/DL (ref 70–99)
HCT VFR BLD AUTO: 40.6 % (ref 35–47)
HGB BLD-MCNC: 12.4 G/DL (ref 11.7–15.7)
MCH RBC QN AUTO: 26.3 PG (ref 26.5–33)
MCHC RBC AUTO-ENTMCNC: 30.5 G/DL (ref 31.5–36.5)
MCV RBC AUTO: 86 FL (ref 78–100)
PLATELET # BLD AUTO: 226 10E9/L (ref 150–450)
POTASSIUM SERPL-SCNC: 3.2 MMOL/L (ref 3.4–5.3)
POTASSIUM SERPL-SCNC: 4.3 MMOL/L (ref 3.4–5.3)
RBC # BLD AUTO: 4.71 10E12/L (ref 3.8–5.2)
SODIUM SERPL-SCNC: 137 MMOL/L (ref 133–144)
WBC # BLD AUTO: 5.2 10E9/L (ref 4–11)

## 2019-02-17 PROCEDURE — 93306 TTE W/DOPPLER COMPLETE: CPT | Mod: 26 | Performed by: INTERNAL MEDICINE

## 2019-02-17 PROCEDURE — A9270 NON-COVERED ITEM OR SERVICE: HCPCS | Mod: GY | Performed by: INTERNAL MEDICINE

## 2019-02-17 PROCEDURE — 12000000 ZZH R&B MED SURG/OB

## 2019-02-17 PROCEDURE — 25000132 ZZH RX MED GY IP 250 OP 250 PS 637: Mod: GY | Performed by: INTERNAL MEDICINE

## 2019-02-17 PROCEDURE — 97112 NEUROMUSCULAR REEDUCATION: CPT | Mod: GP

## 2019-02-17 PROCEDURE — 36415 COLL VENOUS BLD VENIPUNCTURE: CPT | Performed by: INTERNAL MEDICINE

## 2019-02-17 PROCEDURE — 97116 GAIT TRAINING THERAPY: CPT | Mod: GP

## 2019-02-17 PROCEDURE — 00000146 ZZHCL STATISTIC GLUCOSE BY METER IP

## 2019-02-17 PROCEDURE — 25800025 ZZH RX 258: Performed by: INTERNAL MEDICINE

## 2019-02-17 PROCEDURE — 97535 SELF CARE MNGMENT TRAINING: CPT | Mod: GO

## 2019-02-17 PROCEDURE — C9113 INJ PANTOPRAZOLE SODIUM, VIA: HCPCS | Performed by: INTERNAL MEDICINE

## 2019-02-17 PROCEDURE — 85027 COMPLETE CBC AUTOMATED: CPT | Performed by: INTERNAL MEDICINE

## 2019-02-17 PROCEDURE — 25000128 H RX IP 250 OP 636: Performed by: INTERNAL MEDICINE

## 2019-02-17 PROCEDURE — 84132 ASSAY OF SERUM POTASSIUM: CPT | Performed by: INTERNAL MEDICINE

## 2019-02-17 PROCEDURE — 80048 BASIC METABOLIC PNL TOTAL CA: CPT | Performed by: INTERNAL MEDICINE

## 2019-02-17 PROCEDURE — 99233 SBSQ HOSP IP/OBS HIGH 50: CPT | Performed by: INTERNAL MEDICINE

## 2019-02-17 PROCEDURE — 40000264 ECHOCARDIOGRAM COMPLETE

## 2019-02-17 RX ORDER — ACETAMINOPHEN 650 MG/1
650 SUPPOSITORY RECTAL EVERY 4 HOURS PRN
Status: DISCONTINUED | OUTPATIENT
Start: 2019-02-17 | End: 2019-02-26 | Stop reason: HOSPADM

## 2019-02-17 RX ORDER — ACYCLOVIR 200 MG/1
30 CAPSULE ORAL ONCE
Status: COMPLETED | OUTPATIENT
Start: 2019-02-17 | End: 2019-02-17

## 2019-02-17 RX ORDER — ACETAMINOPHEN 325 MG/1
650 TABLET ORAL EVERY 4 HOURS PRN
Status: DISCONTINUED | OUTPATIENT
Start: 2019-02-17 | End: 2019-02-26 | Stop reason: HOSPADM

## 2019-02-17 RX ORDER — ONDANSETRON 2 MG/ML
4 INJECTION INTRAMUSCULAR; INTRAVENOUS EVERY 6 HOURS PRN
Status: DISCONTINUED | OUTPATIENT
Start: 2019-02-17 | End: 2019-02-26 | Stop reason: HOSPADM

## 2019-02-17 RX ORDER — ONDANSETRON 4 MG/1
4 TABLET, ORALLY DISINTEGRATING ORAL EVERY 6 HOURS PRN
Status: DISCONTINUED | OUTPATIENT
Start: 2019-02-17 | End: 2019-02-26 | Stop reason: HOSPADM

## 2019-02-17 RX ORDER — METOCLOPRAMIDE 5 MG/1
5 TABLET ORAL EVERY 6 HOURS PRN
Status: DISCONTINUED | OUTPATIENT
Start: 2019-02-17 | End: 2019-02-26 | Stop reason: HOSPADM

## 2019-02-17 RX ORDER — PROCHLORPERAZINE 25 MG
12.5 SUPPOSITORY, RECTAL RECTAL EVERY 12 HOURS PRN
Status: DISCONTINUED | OUTPATIENT
Start: 2019-02-17 | End: 2019-02-26 | Stop reason: HOSPADM

## 2019-02-17 RX ORDER — METOCLOPRAMIDE HYDROCHLORIDE 5 MG/ML
5 INJECTION INTRAMUSCULAR; INTRAVENOUS EVERY 6 HOURS PRN
Status: DISCONTINUED | OUTPATIENT
Start: 2019-02-17 | End: 2019-02-26 | Stop reason: HOSPADM

## 2019-02-17 RX ORDER — PROCHLORPERAZINE MALEATE 5 MG
5 TABLET ORAL EVERY 6 HOURS PRN
Status: DISCONTINUED | OUTPATIENT
Start: 2019-02-17 | End: 2019-02-26 | Stop reason: HOSPADM

## 2019-02-17 RX ADMIN — ATORVASTATIN CALCIUM 20 MG: 20 TABLET, FILM COATED ORAL at 08:22

## 2019-02-17 RX ADMIN — ACETAMINOPHEN 650 MG: 325 TABLET, FILM COATED ORAL at 17:20

## 2019-02-17 RX ADMIN — POTASSIUM CHLORIDE 20 MEQ: 1500 TABLET, EXTENDED RELEASE ORAL at 12:05

## 2019-02-17 RX ADMIN — AMLODIPINE BESYLATE 10 MG: 10 TABLET ORAL at 12:04

## 2019-02-17 RX ADMIN — PANTOPRAZOLE SODIUM 40 MG: 40 INJECTION, POWDER, FOR SOLUTION INTRAVENOUS at 21:28

## 2019-02-17 RX ADMIN — CYANOCOBALAMIN TAB 1000 MCG 1000 MCG: 1000 TAB at 08:22

## 2019-02-17 RX ADMIN — APIXABAN 5 MG: 5 TABLET, FILM COATED ORAL at 08:22

## 2019-02-17 RX ADMIN — APIXABAN 5 MG: 5 TABLET, FILM COATED ORAL at 21:28

## 2019-02-17 RX ADMIN — ACETAMINOPHEN 650 MG: 325 TABLET, FILM COATED ORAL at 21:28

## 2019-02-17 RX ADMIN — POTASSIUM CHLORIDE 20 MEQ: 1500 TABLET, EXTENDED RELEASE ORAL at 08:21

## 2019-02-17 RX ADMIN — POTASSIUM CHLORIDE 40 MEQ: 1500 TABLET, EXTENDED RELEASE ORAL at 08:21

## 2019-02-17 RX ADMIN — FAMOTIDINE 20 MG: 20 TABLET ORAL at 08:22

## 2019-02-17 RX ADMIN — ACETAMINOPHEN 650 MG: 325 TABLET, FILM COATED ORAL at 12:04

## 2019-02-17 RX ADMIN — SODIUM CHLORIDE 30 ML: 9 INJECTION, SOLUTION INTRAMUSCULAR; INTRAVENOUS; SUBCUTANEOUS at 11:33

## 2019-02-17 RX ADMIN — BUMETANIDE 2 MG: 2 TABLET ORAL at 08:21

## 2019-02-17 RX ADMIN — PANTOPRAZOLE SODIUM 40 MG: 40 INJECTION, POWDER, FOR SOLUTION INTRAVENOUS at 08:21

## 2019-02-17 RX ADMIN — SUCRALFATE 1 G: 1 SUSPENSION ORAL at 17:25

## 2019-02-17 RX ADMIN — SUCRALFATE 1 G: 1 SUSPENSION ORAL at 06:49

## 2019-02-17 RX ADMIN — OXYBUTYNIN CHLORIDE 15 MG: 5 TABLET, EXTENDED RELEASE ORAL at 08:21

## 2019-02-17 RX ADMIN — LISINOPRIL 5 MG: 5 TABLET ORAL at 12:04

## 2019-02-17 ASSESSMENT — ACTIVITIES OF DAILY LIVING (ADL)
ADLS_ACUITY_SCORE: 14
ADLS_ACUITY_SCORE: 14
ADLS_ACUITY_SCORE: 15
ADLS_ACUITY_SCORE: 14
ADLS_ACUITY_SCORE: 16
ADLS_ACUITY_SCORE: 14

## 2019-02-17 ASSESSMENT — MIFFLIN-ST. JEOR: SCORE: 1218.39

## 2019-02-17 NOTE — CONSULTS
Discharge Planner   Discharge Plans in progress: Yes, therapy recommending ARU at discharge. Contacted  ARU liaison for referral.   Barriers to discharge plan: Pending acceptance to ARU. ARU requesting OT cognitive evaluation and further clarification for support at home once patient discharges.   Follow up plan: CM will continue to follow for discharge planning.        Entered by: Alesha Lopez 02/17/2019 11:23 AM       Alesha MENESES  Care Transition Services  [phone]    Update: 1400 Met with patient, daughter Brianne, and Dr. Caicedo at bedside. Updated patient that patient is appropriate for  ARU. Daughter and patient had questions about ARU and given information from [employer]. Patient expressed concern with transferring to ARU as she and  have a vacation planned on 2/24/19 to travel to [place] with family. Patient states that this will be the last vacation that her and her  take together. Daughter expressed concerns with patient continuing with vacation plans considering recent diagnosis and hospitalization. Dr. Caicedo encouraged patient to discharge to ARU as recommended and stated to patient that if patient progressed in strength could then consider going on vacation as planned. Patient not wanting to discharge to ARU, expressing interest in Home therapy. Explained to patient the difference in level of intensity of therapy between ARU and home therapy.     Update:[id number] Spoke with patient and daughter who are now in agreement to ARU. Requesting referral to be sent to ARU in [place] as this is close to the daughter. Referral sent to [facility] ARU per request of patient.

## 2019-02-17 NOTE — PLAN OF CARE
Diagnosis: AMS   MRI and CT completed, see results. Telestroke completed today, see note.       Vitals: BP soft, all other VSS.   Orientation: Alert but disoriented to place.   Respiratory: On RA, sating appropriately. Lungs sound clear.   Cards/Tele: Afib/Aflutter (Hr 58) with some peaked T's.   GI/: Some nausea/abd discomfort noted, Ice chips given.   Skin: Scattered bruises  Labs: K 3.7. Bg 80's-110's. No coverage given.   Pain: C/O pain to abdomen/ back, received tylenol x1.     Diet: DD2 + thin   Activity: A1 + walker       Plan for discharge: Couple days out to ARU.     Will continue to monitor.

## 2019-02-17 NOTE — PLAN OF CARE
Orientation: A&Ox4  VS: BP meds held early morning- BP elevated at 1130 check meds given  LS: clear, RA  Tele: Afib/BBB/prolonged QT  GI: hypoactive  : voiding  Skin: intact  Activity: Ax1 w/ walker  Pain: denies  Lines/IV: PIV  Plan: discharge to acute rehab possibly tomorrow

## 2019-02-17 NOTE — PLAN OF CARE
Discharge Planner PT   Patient plan for discharge: Not stated  Current status: Progressed to CGA STS no assistive device, improved stability, no LOB to L with transfers this day. Focus on progressing gait, limited by feeling nauseated. 55' x 2 with FWW and CGA, improved stability and walker management, no LOB. Progressed to gait with use of RHR and CGA, inc instability noted. Focus on standing balance activities as pt demo impaired LLE coordination and dynamic balance     Barriers to return to prior living situation: A x 1, impaired balance, coordination. IND prior. Care giver for      Recommendations for discharge: ARU  Rationale for recommendations: Pt would benefit from ARU stay as pt previously IND with mobility and is motivated to return home. Has both OT/PT needs. Participates well in therapy. Pt currently living in IND living side of facility,  lives in [facility]. Family involved and supportive.  If pt/family refuse then recommend home with 24/7 assist and HHPT to address above impairments          Entered by: Silvia Ovalle 02/17/2019 10:03 AM

## 2019-02-17 NOTE — PLAN OF CARE
Discharge Planner OT   Patient plan for discharge: ARU  Current status: Pt completed MoCA scoring 26/30 (26+ = normal) placing pt in normal category. Pt with deficit in recall (3/5) and abstract and executive skills. Pt educated on results of screen and implications with regards to IADLs. Pt's insight into deficits and the need for ongoing assist has significantly improved since initial evaluation. Pt very motivated to participate and improve functioning.   Barriers to return to prior living situation: Current level of assist for ADLs/transfers, decreased strength/functional activity tolerance  Recommendations for discharge: ARU  Rationale for recommendations: Pt would benefit from ongoing skilled OT to maximize safety and indep in all ADLs/IADLs and mobility tasks. Pt is motivated to return to indep PLOF, has strong family support. Pt reports facility could increase services if needed.         Entered by: Blanche Watts 02/17/2019 4:05 PM

## 2019-02-17 NOTE — PLAN OF CARE
OT session attempted. Pt currently having nausea and pain in stomach. Declines OT participation at this time, requesting to rest.

## 2019-02-17 NOTE — PROGRESS NOTES
Patient was seen and examined by me this morning.  She feels nauseous today and generalized weakness.  Appetite is poor.  Patient up in chair appears to have a flat affect.  Plan discussed with nursing staff  Plan is social service to coordinate discharge to rehab tomorrow

## 2019-02-17 NOTE — PROGRESS NOTES
Brief Vascular Neuro Note    Patient not seen over telemedicine.    Stroke Evaluation summarized:  MRI/Head CT: Scattered small ischemic strokes bilateral hemisphere  Intracranial Vascular Imaging: No significant narrowing  Cervical Carotid and Vertebral Artery Vascular Imaging: Right vert occlusion  Echocardiogram: Pending  EKG/Telemetry: Afib  LDL: 78  A1c: 6.3  Troponin: Negative x 3  Other testing: Not Applicable     Stroke Education  -Patient/family advised regarding healthy diet and regular aerobic exercise.  -Patient/family advised regarding the importance of tobacco cessation and discussed the use of adjunctive medications and nicotine supplementation  -Patient/family advised regarding the signs and symptoms of stroke and that immediate presentation to an ED for evaluation is critical.     PHQ-2 Depression Screening  Over the last 2 weeks, how many days have you noted: Never   (0 points) Several  (1 point) More than half  (2 points) Nearly all  (3 points)   Little interest/pleasure in doing things?      1       Feeling down, depressed, or hopeless?   1          PHQ-2 depression score: 2, consistent with a negative screen for depression.       Impression  Ischemic Stroke due to cardioembolism  Afib     Recommendations  Acute Ischemic Stroke (without tPA) Recommendations  - Neurochecks  - Aim for normotension  - Euthermia, Euglycemia  - Continue Eliquis; Does not need Aspirin from stroke perspective  - Statin  - TTE with Bubble Study - pending  - Telemetry  - Bedside Glucose Monitoring  - PT/OT/SLP  - PM&R  - Stroke Education     Patient Follow-up    - in 4-6 weeks with local neurologist     Please contact the Stroke Service with any questions.     Frederick Drake MD  Neurology  February 17, 2019     Text Page ([phone])

## 2019-02-17 NOTE — PROGRESS NOTES
[facility]  Hospitalist Progress Note  Rony Caicedo MD 02/17/2019    Reason for Stay (Diagnosis):   Abdominal pain  Peptic ulcer disease  Acute CVA         Assessment and Plan:      Summary of Stay: Rhonda Mathur is a 79 year old female with multiple medical history such as chronic atrial fibrillation on oral anticoagulation, CAD, chronic heart failure with preserved ejection fraction, hypertension, diabetes mellitus non-insulin-requiring, dyslipidemia, prior anemia, currently living independently at home and presented in the emergency room mostly due to increasing episodes of abdominal discomfort and pain leading to decreased oral intake at least for the past 1-2 days.      Hospital course: Patient was closely monitored for abdominal pain.  Her abdominal pain persisted and gastroenterology team was consulted to assist with further management.  Patient underwent endoscopy which revealed duodenitis and nonbleeding gastric ulcers with no stigmata of bleeding which was biopsied.  Patient was started on PPI and closely monitored.  Following endoscopy, patient became encephalopathic which is on and off which was initially attributed to benzodiazepine narcotic medication received for endoscopy.  Due to persistent encephalopathy, MRI was obtained which showed evidence of ischemic stroke for which stroke neurologist was consulted.  Patient was started on her Eliquis which she was on.  She remained stable   Physical therapy, occupational therapy and social service consulted to assist with discharge planning.  Patient is a candidate  for acute rehab    Problem List:   1. Abdominal pain: GI consulted and endoscopy results reviewed.  Patient has peptic ulcer disease.  Continue PPI, appreciate GI input.  No NSAIDs.  Follow biopsy results and H. pylori testing  -Abdominal pain much better.  She  2. Hypokalemia: Replace per protocol  3. CHF: Stable  4. Type 2 diabetes: Blood sugar controlled  5. Hypertension,  controlled  6. Generalized weakness: PT OT and discharge planning  7. Frequent cardiac pauses: Patient is asymptomatic without hypotension or chest pain.  Care discussed with cardiology team.  Plan is to decrease beta-blocker and monitor patient.  Continue telemetry monitoring and cardiology consultation obtained   8. AMS -acute encephalopathy on and off since EGD.  And RRT was noted last night.  Mental status much better this morning.  Blood sugar was 27 at one point in his was treated with action.  I obtained MRI of the brain which showed evidence small multiple areas of restricted diffusion in the right hemisphere consistent with recent infarct.  Neurology was consulted for encephalopathy earlier and recommending telemetry stroke team to be contacted and be consulted.  I consulted telemetry stroke team and entered stroke workup including echocardiogram, continue to monitor on telemetry   --Patient is stable, continue physical therapy and occupational therapy.   --Echocardiogram is pending  --I did speak with gastroenterology regarding Eliquis in a setting of peptic ulcer disease.  Dr. Conde recommended that it is okay to continue Eliquis.  Input from stroke neurologist and gastroenterology appreciated.    DVT Prophylaxis: Pneumatic Compression Devices    Code Status: DNR / DNI  Discharge Dispo: Acute rehab unit  Estimated Disch Date / # of Days until Disch: Patient can be discharged to acute rehab unit in the next 1 or 2 days if bed is available.  Extensive discussion held at bedside regarding patient discharge plan.  Multiple family present at the discussion and patient wants to go home but family wanted her to go to acute rehab unit as recommended by PT and OT.  Of note patient is planning to go to [place] on vacation next week.  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  Tentatively discharge to acute rehab unit tomorrow  but patient is not 100% sure if she  "wants to go to acute rehab unit.         Interval History (Subjective):              Patient was seen and examined by me this morning.  She feels better.  No complaint of abdominal pain.  No weakness of extremities.  No vision changes or speech difficulty.  She has generalized body weakness and not interested in going to TCU or acute rehab unit.  We had a long discussion at bedside in presence of care coordinator, patient daughter Brianne and granddaughter as well.          Physical Exam:      Last Vital Signs:  /66 (BP Location: Right arm)   Pulse 78   Temp 96.7  F (35.9  C) (Oral)   Resp 20   Ht 1.676 m (5' 6\")   Wt 73.7 kg (162 lb 6.4 oz)   SpO2 96%   BMI 26.21 kg/m      She is      Constitutional:  Alert and oriented x3 but she is very slow   Respiratory: Clear to auscultation bilaterally, no crackles or wheezing   Cardiovascular: Regular rate and rhythm, normal S1 and S2, and no murmur noted   Abdomen: Normal bowel sounds, soft, non-distended, non-tender   Skin: No rashes, no cyanosis, dry to touch   Neuro: , non focal    Extremities: No edema, normal range of motion   Other(s):        All other systems: Negative          Medications:      All current medications were reviewed with changes reflected in problem list.         Data:      All new lab and imaging data was reviewed.   Labs:  All laboratory and imaging data in the past 24 hours reviewed     Recent Labs   Lab 02/17/19  0617 02/16/19  0233 02/15/19  1009   WBC 5.2 6.5 7.5   HGB 12.4 11.7 12.4   HCT 40.6 38.8 40.7   MCV 86 88 87    213 235     No results for input(s): CULT in the last 168 hours.  Recent Labs   Lab 02/17/19  0617 02/16/19  1521 02/16/19  1143 02/16/19  0547 02/16/19  0233  02/14/19  0735  02/12/19  0111  02/11/19  1917     --   --  134 135   < > 137   < >  --   --  138   POTASSIUM 3.2*  --  3.7 3.3* 3.2*   < > 3.6   < > 3.0*  --  2.7*   CHLORIDE 98  --   --  97 97   < > 99   < >  --   --  96   CO2 32  --   --  32 " 31   < > 33*   < >  --   --  33*   ANIONGAP 7  --   --  5 7   < > 5   < >  --   --  9   GLC 84  --   --  100* 194*   < > 90   < >  --   --  107*   BUN 17  --   --  15 16   < > 16   < >  --   --  14   CR 1.23*  --   --  1.22* 1.21*   < > 1.15*   < >  --   --  1.01   GFRESTIMATED 41*  --   --  42* 42*   < > 45*   < >  --    < > 52*   GFRESTBLACK 48*  --   --  48* 49*   < > 52*   < >  --    < > 60*   CONRADO 9.1  --   --  8.6 8.4*   < > 8.7   < >  --   --  8.4*   MAG  --   --   --   --   --   --  1.6  --  2.0  --   --    PROTTOTAL  --  6.8  --   --   --   --   --   --   --   --  6.9   ALBUMIN  --  3.4  --   --   --   --   --   --   --   --  3.7   BILITOTAL  --  0.5  --   --   --   --   --   --   --   --  0.4   ALKPHOS  --  72  --   --   --   --   --   --   --   --  77   AST  --  17  --   --   --   --   --   --   --   --  18   ALT  --  13  --   --   --   --   --   --   --   --  15    < > = values in this interval not displayed.       Recent Labs   Lab 02/17/19  1157 02/17/19  0806 02/17/19  0617 02/17/19  0613 02/17/19  0142 02/16/19  2102  02/16/19  0547  02/16/19  0233  02/15/19  1009  02/14/19  0735   GLC  --   --  84  --   --   --   --  100*  --  194*  --  93  --  90   BGM 98 120*  --  83 81 105*   < >  --    < >  --    < >  --    < >  --     < > = values in this interval not displayed.           No results for input(s): INR in the last 168 hours.        Recent Labs   Lab 02/15/19  1750 02/15/19  1351 02/15/19  1009   TROPI <0.015 <0.015 <0.015       Recent Results (from the past 48 hour(s))   Abd/pelvis CT,  IV  contrast only TRAUMA / AAA    Narrative    CT ABDOMEN AND PELVIS WITH CONTRAST   2/11/2019 7:45 PM     HISTORY: Epigastric abdominal pain radiating to back.    TECHNIQUE:  CT abdomen and pelvis with 81 mL Isovue-370 IV. Radiation  dose for this scan was reduced using automated exposure control,  adjustment of the mA and/or kV according to patient size, or iterative  reconstruction technique.    COMPARISON:  None.    FINDINGS:  Abdomen: The lung bases are unremarkable. The heart is enlarged. The  gallbladder is absent. The liver, spleen, pancreas, adrenal glands are  normal in appearance. There are several small low-attenuation cortical  lesions in the kidneys bilaterally which are likely cysts. There is  mild right renal atrophy when compared to the left. No hydronephrosis.  No abdominal or pelvic lymph node enlargement. There is  atherosclerotic calcification of aorta and its branches. No aneurysm.    Pelvis: There is no bowel obstruction or inflammation. No free  intraperitoneal gas or fluid. Uterus and adnexa appear grossly normal.  There are postoperative changes in the anterior abdominal wall.  Degenerative disease in the spine.      Impression    IMPRESSION:  1. No acute abnormality. No bowel obstruction or inflammation.  2. Cardiomegaly.    MEERA DAMIAN MD

## 2019-02-18 ENCOUNTER — APPOINTMENT (OUTPATIENT)
Dept: CT IMAGING | Facility: CLINIC | Age: 79
DRG: 383 | End: 2019-02-18
Attending: PHYSICIAN ASSISTANT
Payer: MEDICARE

## 2019-02-18 ENCOUNTER — APPOINTMENT (OUTPATIENT)
Dept: SPEECH THERAPY | Facility: CLINIC | Age: 79
DRG: 383 | End: 2019-02-18
Payer: MEDICARE

## 2019-02-18 ENCOUNTER — APPOINTMENT (OUTPATIENT)
Dept: MRI IMAGING | Facility: CLINIC | Age: 79
DRG: 383 | End: 2019-02-18
Attending: PHYSICIAN ASSISTANT
Payer: MEDICARE

## 2019-02-18 LAB
COPATH REPORT: NORMAL
ERYTHROCYTE [DISTWIDTH] IN BLOOD BY AUTOMATED COUNT: 15.5 % (ref 10–15)
GLUCOSE BLDC GLUCOMTR-MCNC: 106 MG/DL (ref 70–99)
GLUCOSE BLDC GLUCOMTR-MCNC: 108 MG/DL (ref 70–99)
GLUCOSE BLDC GLUCOMTR-MCNC: 109 MG/DL (ref 70–99)
GLUCOSE BLDC GLUCOMTR-MCNC: 111 MG/DL (ref 70–99)
GLUCOSE BLDC GLUCOMTR-MCNC: 87 MG/DL (ref 70–99)
GLUCOSE BLDC GLUCOMTR-MCNC: 89 MG/DL (ref 70–99)
HCT VFR BLD AUTO: 40.6 % (ref 35–47)
HGB BLD-MCNC: 12.6 G/DL (ref 11.7–15.7)
INTERPRETATION ECG - MUSE: NORMAL
MCH RBC QN AUTO: 27 PG (ref 26.5–33)
MCHC RBC AUTO-ENTMCNC: 31 G/DL (ref 31.5–36.5)
MCV RBC AUTO: 87 FL (ref 78–100)
PLATELET # BLD AUTO: 266 10E9/L (ref 150–450)
POTASSIUM SERPL-SCNC: 4.1 MMOL/L (ref 3.4–5.3)
RBC # BLD AUTO: 4.67 10E12/L (ref 3.8–5.2)
WBC # BLD AUTO: 6.1 10E9/L (ref 4–11)

## 2019-02-18 PROCEDURE — 12000000 ZZH R&B MED SURG/OB

## 2019-02-18 PROCEDURE — A9270 NON-COVERED ITEM OR SERVICE: HCPCS | Mod: GY | Performed by: PHYSICIAN ASSISTANT

## 2019-02-18 PROCEDURE — 84132 ASSAY OF SERUM POTASSIUM: CPT | Performed by: INTERNAL MEDICINE

## 2019-02-18 PROCEDURE — 25000132 ZZH RX MED GY IP 250 OP 250 PS 637: Mod: GY | Performed by: PHYSICIAN ASSISTANT

## 2019-02-18 PROCEDURE — 70450 CT HEAD/BRAIN W/O DYE: CPT

## 2019-02-18 PROCEDURE — 00000146 ZZHCL STATISTIC GLUCOSE BY METER IP

## 2019-02-18 PROCEDURE — 25000132 ZZH RX MED GY IP 250 OP 250 PS 637: Mod: GY | Performed by: INTERNAL MEDICINE

## 2019-02-18 PROCEDURE — C9113 INJ PANTOPRAZOLE SODIUM, VIA: HCPCS | Performed by: INTERNAL MEDICINE

## 2019-02-18 PROCEDURE — 85027 COMPLETE CBC AUTOMATED: CPT | Performed by: INTERNAL MEDICINE

## 2019-02-18 PROCEDURE — 70551 MRI BRAIN STEM W/O DYE: CPT

## 2019-02-18 PROCEDURE — A9270 NON-COVERED ITEM OR SERVICE: HCPCS | Mod: GY | Performed by: INTERNAL MEDICINE

## 2019-02-18 PROCEDURE — 25000128 H RX IP 250 OP 636: Performed by: INTERNAL MEDICINE

## 2019-02-18 PROCEDURE — 92526 ORAL FUNCTION THERAPY: CPT | Mod: GN

## 2019-02-18 PROCEDURE — 99233 SBSQ HOSP IP/OBS HIGH 50: CPT | Performed by: INTERNAL MEDICINE

## 2019-02-18 PROCEDURE — 36415 COLL VENOUS BLD VENIPUNCTURE: CPT | Performed by: INTERNAL MEDICINE

## 2019-02-18 RX ORDER — ASPIRIN 81 MG/1
81 TABLET, CHEWABLE ORAL DAILY
Status: COMPLETED | OUTPATIENT
Start: 2019-02-18 | End: 2019-02-24

## 2019-02-18 RX ORDER — ATORVASTATIN CALCIUM 20 MG/1
20 TABLET, FILM COATED ORAL EVERY EVENING
Status: DISCONTINUED | OUTPATIENT
Start: 2019-02-18 | End: 2019-02-18

## 2019-02-18 RX ORDER — ATORVASTATIN CALCIUM 20 MG/1
20 TABLET, FILM COATED ORAL EVERY EVENING
Status: DISCONTINUED | OUTPATIENT
Start: 2019-02-18 | End: 2019-02-21

## 2019-02-18 RX ORDER — DEXTROSE MONOHYDRATE 25 G/50ML
25-50 INJECTION, SOLUTION INTRAVENOUS
Status: DISCONTINUED | OUTPATIENT
Start: 2019-02-18 | End: 2019-02-18

## 2019-02-18 RX ORDER — CARVEDILOL 3.12 MG/1
3.12 TABLET ORAL 2 TIMES DAILY WITH MEALS
Status: DISCONTINUED | OUTPATIENT
Start: 2019-02-18 | End: 2019-02-22

## 2019-02-18 RX ORDER — CARVEDILOL 3.12 MG/1
3.12 TABLET ORAL 2 TIMES DAILY WITH MEALS
Status: DISCONTINUED | OUTPATIENT
Start: 2019-02-18 | End: 2019-02-18

## 2019-02-18 RX ORDER — NICOTINE POLACRILEX 4 MG
15-30 LOZENGE BUCCAL
Status: DISCONTINUED | OUTPATIENT
Start: 2019-02-18 | End: 2019-02-18

## 2019-02-18 RX ADMIN — ACETAMINOPHEN 650 MG: 325 TABLET, FILM COATED ORAL at 15:42

## 2019-02-18 RX ADMIN — ACETAMINOPHEN 650 MG: 325 TABLET, FILM COATED ORAL at 20:40

## 2019-02-18 RX ADMIN — ASPIRIN 81 MG 81 MG: 81 TABLET ORAL at 18:59

## 2019-02-18 RX ADMIN — PANTOPRAZOLE SODIUM 40 MG: 40 INJECTION, POWDER, FOR SOLUTION INTRAVENOUS at 12:51

## 2019-02-18 RX ADMIN — PANTOPRAZOLE SODIUM 40 MG: 40 INJECTION, POWDER, FOR SOLUTION INTRAVENOUS at 20:40

## 2019-02-18 RX ADMIN — APIXABAN 5 MG: 5 TABLET, FILM COATED ORAL at 20:40

## 2019-02-18 RX ADMIN — ATORVASTATIN CALCIUM 20 MG: 20 TABLET, FILM COATED ORAL at 20:40

## 2019-02-18 RX ADMIN — CARVEDILOL 3.12 MG: 3.12 TABLET, FILM COATED ORAL at 18:59

## 2019-02-18 RX ADMIN — SODIUM CHLORIDE 1000 ML: 9 INJECTION, SOLUTION INTRAVENOUS at 08:24

## 2019-02-18 ASSESSMENT — MIFFLIN-ST. JEOR: SCORE: 1194.8

## 2019-02-18 ASSESSMENT — ACTIVITIES OF DAILY LIVING (ADL)
ADLS_ACUITY_SCORE: 14
ADLS_ACUITY_SCORE: 15

## 2019-02-18 NOTE — PLAN OF CARE
Pt A/Ox4, VSS, c/o abdominal pain 7/10- PRN tylenol given x2, Tele: Afib CVR/BBB/ prolonged QT, K+ rechecked 4.3, BG 99 & 99, poor intake this shift- needs encouragement, DD2 w/ thins diet, up Ax1 w/ walker, neuros q4h- intact, BS- hypoactive, PIV LA SL, continue POC.

## 2019-02-18 NOTE — PROGRESS NOTES
Care Coordination:  Call received from Mamie at [facility] [phone]. She was requesting additional chart documents from PT/OT from yesterday with their recommendation for ARU. PT/OT chart notes from 2/17 faxed to fax # [phone]. She will call back after they have assessed her.    Kaelyn Stephens RN CTS

## 2019-02-18 NOTE — PLAN OF CARE
OT: Attempted to see for OT POC.  Pt not available, at CT scan.  Checked in second time, nursing reported pt unable to tolerate activity.  Will cancel session today.

## 2019-02-18 NOTE — PLAN OF CARE
Upon initial rounds pt was unresponsive, VSS but only responding to painful stimuli, MD came to assess, fluid bolus, CT and MRI was ordered, around 1330 pt became more alert - disorientated to situation only and calling appropriately.  's, diet advanced to regular, awaiting test results, will continue POC.

## 2019-02-18 NOTE — PROGRESS NOTES
Discharge Planner   Discharge Plans in progress: [facility] accepted when pt medically ready  Barriers to discharge plan: pt not medically ready for discharge          Entered by: Kaelyn Stephens 02/18/2019 2:18 PM

## 2019-02-18 NOTE — PROGRESS NOTES
I was called for asymptomatic 2-2.5 second pauses. Chart reviewed.  These are not new. Plan has been to decrease dose of coreg and monitor. I decreased dose from 6.25 to 3.125 mg bid

## 2019-02-18 NOTE — PROGRESS NOTES
Care Coordination:  Call received again from Mamie at [facility] ARU admissions. They have assessed pt and feel she is appropriate for admission to their ARU. They can accept her when she is medically ready. They feel she would be appropriate for a 5-7 day stay.    On day of discharge prior to pt going to ARU they will need from MD:  -discharge orders  -discharge summary (no MD to MD report needed)  -RN to RN report [phone]    Orders on discharge can be faxed to [facility] at fax # [phone].    Per Hospitalist, pt is not medically ready for discharge today and will not be for another couple days. CTS will update [facility] daily with plan of care, [phone]. Met with pt and family at bedside to [facility]. [facility] are [facility] to plan at this time. Pamphlet for [facility] given to pt/family. Transportation also discussed and family prefers to transport on discharge. Will cont to follow.    [facility]  [address] [place]  [address]  *use any entrance  *she will be on [id number]    Kaelyn Stephens RN CTS

## 2019-02-18 NOTE — PROGRESS NOTES
"Page texted/hospitalist \"Tele is showing patient is having 2 - 2.5 second pauses. Pt is showing no symptoms.   Please advise if would like interventions.\"  "

## 2019-02-18 NOTE — PROGRESS NOTES
[facility]  Hospitalist Progress Note  Name: Rhonda Mathur    MRN: [id number]  YOB: 1940    Age: 79 year old  Date of admission: 2/11/2019  Primary care provider: Diego Taveras      Reason for Stay (Diagnosis): Abdominal pain/peptic ulcer disease/CVA         Assessment and Plan:      Summary of Stay:  Rhonda Mathur is a 79 year old female with multiple medical history such as chronic atrial fibrillation on oral anticoagulation, CAD, chronic heart failure with preserved ejection fraction, hypertension, diabetes mellitus non-insulin-requiring, dyslipidemia, prior anemia, currently living independently at home and presented in the emergency room mostly due to increasing episodes of abdominal discomfort and pain leading to decreased oral intake at least for the past 1-2 days.       Hospital course: Patient was closely monitored for abdominal pain.  Her abdominal pain persisted and gastroenterology team was consulted to assist with further management.  Patient underwent endoscopy which revealed duodenitis and nonbleeding gastric ulcers with no stigmata of bleeding which was biopsied.  Patient was started on PPI and closely monitored.  Following endoscopy, patient became encephalopathic which is on and off which was initially attributed to benzodiazepine narcotic medication received for endoscopy.    Due to persistent encephalopathy, MRI was obtained which showed evidence of ischemic stroke which was felt to be embolic in nature and patient was started on Eliquis.  Echocardiogram with bubble study was negative but did demonstrate a small mobile filamentous echodensity noted on the aortic valve which could represent valvular calcification or a fibrinous strand versus a small papillary fibroelastoma.    On the morning of 2/18/19, patient was less responsive again.  CT of the head was negative for any hemorrhagic transformation.  Repeat MRI was unchanged.    Problem  "List/Plan:  1. CVA, felt to be thromboembolic in nature with history of chronic atrial fibrillation: Patient was started on Eliquis.  Discussed on a few occasions with telemetry stroke specialist today.  Negative head CT for hemorrhagic transformation and repeat MRI was unchanged.  Only recommended continuing Eliquis and a baby aspirin for at least 3 weeks.  Allow for some permissive hypertension with goal systolic blood pressure to be between 120-160.  2. Hypertension: Okay to restart Coreg but at a lower dose of 3.125 mg p.o. twice daily with parameters to hold if systolic blood pressure less than 120.  3. Abdominal pain: Patient initially presented with this.  Status post endoscopy with findings of nonbleeding gastric ulcer and duodenitis.  Continue PPI.  4. History of CHF: Chronically on Bumex, lisinopril, and Coreg.  Bumex and lisinopril currently on hold to allow for some permissive hypertension.  Continue Coreg in the setting of chronic atrial fibrillation.  5. Type 2 diabetes: Continue medium intensity sliding scale.  Metformin on hold for now.  6. Cardiac pauses: Otherwise asymptomatic.  Reducing Coreg to 3.125 mg p.o. twice daily for now.      DVT Prophylaxis: Eliquis  Code Status: DNR / DNI  Discharge Dispo: Suspect TCU versus acute rehab  Estimated Disch Date / # of Days until Disch: Anticipate 2-3 more days in the hospital until mental status is improved    Time spent: Greater than 45 minutes.  Daughters and other family members updated on plan of cares.        Interval History (Subjective):      Limited historian secondary to decreased level of consciousness but on reassessment in the afternoon, patient was more alert and able to answer appropriately to simple questions.         Physical Exam:      Vital signs:  Temp: 97.1  F (36.2  C) Temp src: Axillary BP: 178/76   Heart Rate: 71 Resp: 16 SpO2: 96 % O2 Device: None (Room air) Oxygen Delivery: 2 LPM Height: 167.6 cm (5' 6\") Weight: 71.3 kg (157 lb 3.2 " "oz)  Estimated body mass index is 25.37 kg/m  as calculated from the following:    Height as of this encounter: 1.676 m (5' 6\").    Weight as of this encounter: 71.3 kg (157 lb 3.2 oz).    I/O last 3 completed shifts:  In: 120 [P.O.:120]  Out: 300 [Urine:300]  Vitals:    02/13/19 0541 02/14/19 0718 02/15/19 0705 02/17/19 0616   Weight: 71.8 kg (158 lb 3.2 oz) 72.6 kg (160 lb) 70.8 kg (156 lb) 73.7 kg (162 lb 6.4 oz)    02/18/19 0547   Weight: 71.3 kg (157 lb 3.2 oz)       Constitutional:  somnolent with decreased level of responsiveness but able to protect airway.   Respiratory: Nl work of breathing.  Diminished breath sounds at the bases   Cardiovascular:  Irregularly irregular, normal S1 and S2, and no murmur noted   Abdomen: Normal bowel sounds, soft, non-distended, non-tender   Skin: No rashes, no cyanosis, dry to touch   Neuro:  Unable to assess   Extremities:  Trace edema, normal range of motion   HEENT Normocephalic, atraumatic, normal nasal turbinates; oropharynx clear   Neck Supple; nl inspection; trachea midline; no thryomegaly   Psychiatric:  Decreased level of consciousness.  Unable to assess          Medications:      All current medications were reviewed with changes reflected in problem list.         Data:      All new lab and imaging data was reviewed.   Labs:  No results for input(s): CULT in the last 168 hours.  Recent Labs   Lab 02/18/19  0718 02/17/19  0617 02/16/19  0233   WBC 6.1 5.2 6.5   HGB 12.6 12.4 11.7   HCT 40.6 40.6 38.8   MCV 87 86 88    226 213     Recent Labs   Lab 02/18/19  0718 02/17/19  1620 02/17/19  0617 02/16/19  1521  02/16/19  0547 02/16/19  0233  02/14/19  0735  02/12/19  0111  02/11/19  1917   NA  --   --  137  --   --  134 135   < > 137   < >  --   --  138   POTASSIUM 4.1 4.3 3.2*  --    < > 3.3* 3.2*   < > 3.6   < > 3.0*  --  2.7*   CHLORIDE  --   --  98  --   --  97 97   < > 99   < >  --   --  96   CO2  --   --  32  --   --  32 31   < > 33*   < >  --   --  33* "   ANIONGAP  --   --  7  --   --  5 7   < > 5   < >  --   --  9   GLC  --   --  84  --   --  100* 194*   < > 90   < >  --   --  107*   BUN  --   --  17  --   --  15 16   < > 16   < >  --   --  14   CR  --   --  1.23*  --   --  1.22* 1.21*   < > 1.15*   < >  --   --  1.01   GFRESTIMATED  --   --  41*  --   --  42* 42*   < > 45*   < >  --    < > 52*   GFRESTBLACK  --   --  48*  --   --  48* 49*   < > 52*   < >  --    < > 60*   CONRADO  --   --  9.1  --   --  8.6 8.4*   < > 8.7   < >  --   --  8.4*   MAG  --   --   --   --   --   --   --   --  1.6  --  2.0  --   --    PROTTOTAL  --   --   --  6.8  --   --   --   --   --   --   --   --  6.9   ALBUMIN  --   --   --  3.4  --   --   --   --   --   --   --   --  3.7   BILITOTAL  --   --   --  0.5  --   --   --   --   --   --   --   --  0.4   ALKPHOS  --   --   --  72  --   --   --   --   --   --   --   --  77   AST  --   --   --  17  --   --   --   --   --   --   --   --  18   ALT  --   --   --  13  --   --   --   --   --   --   --   --  15    < > = values in this interval not displayed.      Imaging:   Recent Results (from the past 24 hour(s))   CT Head w/o Contrast    Narrative    CT SCAN OF THE HEAD WITHOUT CONTRAST   2/18/2019 9:52 AM     HISTORY: Stroke, follow up; recent right middle cerebral artery  territory infarcts with newly depressed mental status. History of  atrial fibrillation.    TECHNIQUE:  Axial images of the head and coronal reformations without  IV contrast material. Radiation dose for this scan was reduced using  automated exposure control, adjustment of the mA and/or kV according  to patient size, or iterative reconstruction technique.    COMPARISON: CT head 2/15/2019 and CT angiogram and MRI 2/16/2019.    FINDINGS:  There is generalized atrophy of the brain.  There is low  attenuation in the white matter of the cerebral hemispheres consistent  with sequelae of small vessel ischemic disease. Scattered  calcifications are seen in the right middle cerebral  artery territory  in the subarachnoid space indicating embolized calcified  atherosclerotic plaque. These are unchanged compared with the recent  previous noncontrast head CT scan. There is no evidence of  intracranial hemorrhage, mass, acute infarct or anomaly.     The visualized portions of the sinuses and mastoids appear normal.  There is no evidence of trauma.      Impression    IMPRESSION:   1. The tiny infarcts seen on the recent MRI are not visible on this CT  scan.  2. No change in multiple calcifications consistent with embolized  calcified atherosclerotic plaque in the right middle cerebral artery  territory. Age is indeterminate.  3. Atrophy of the brain.  White matter changes consistent with  sequelae of small vessel ischemic disease. This is unchanged.    STEVE NUR MD   MR Brain w/o Contrast    Narrative    MRI BRAIN WITHOUT CONTRAST February 18, 2019 12:25 PM    HISTORY: Stroke, follow up; worsening mental status.    TECHNIQUE: Multiplanar, multisequence MRI of the brain without  gadolinium IV contrast material.      COMPARISON: CT angiogram 2/16/2019, CT head this morning, and MRI  brain 2/16/2019.    FINDINGS: There is generalized atrophy of the brain. White matter  changes are seen in the cerebral hemispheres consistent with sequelae  of small vessel ischemic disease. The recent cortical infarcts in the  lateral aspect of the right frontal and parietal lobes and to a lesser  extent the deep white matter adjacent to the body of the right lateral  ventricle have become much less apparent, and several of the tiny  infarcts are no longer visible. The main ones that  remain visible are  in the right parietal lobe. A few tiny foci of limited diffusion in  the left post central gyrus and the subarachnoid space in the left  posteromedial parietal lobe are also visible, with the infarct no  longer visible on the subarachnoid space lesion still present. These  suggest that the shower of emboli that were  present on the right also  slightly affected the left parietal lobe. No new infarcts are visible.  There is no evidence of hemorrhage or mass. Old lacunar infarcts are  seen in the cerebellar hemispheres, unchanged.    The facial structures appear normal. The arteries at the base of the  brain and the dural venous sinuses appear patent.       Impression    IMPRESSION:    1. Decrease in the conspicuity of the previously identified infarcts  in the cerebral hemispheres, mainly on the right but also in the left  postcentral gyrus.  2. Signal abnormalities related to embolized atheromatous are present  bilaterally but more prominent on the right and are unchanged.  3. Brain atrophy and white matter changes consistent with sequelae of  small vessel ischemic disease, unchanged.  4. Old lacunar infarcts in the cerebellar hemispheres, unchanged.    MD Lexa COVARRUBIAS -[phone]

## 2019-02-18 NOTE — CONSULTS
"CLINICAL NUTRITION SERVICES  -  ASSESSMENT NOTE      MALNUTRITION:  % Weight Loss: No weight loss noted over the past month or during admit  % Intake:  </= 50% for >/= 5 days (severe malnutrition)  Subcutaneous Fat Loss:  None observed --> in face  Muscle Loss:  Temporal region mild depletion --> in face, patient denied full exam  Fluid Retention:  None noted    Malnutrition Diagnosis: Non-Severe malnutrition  In Context of:  Acute illness or injury  Chronic illness or disease        REASON FOR ASSESSMENT  Rhonda Mathur is a 79 year old female seen by Registered Dietitian for LOS.      NUTRITION HISTORY  - Information obtained from patient, family at bedside.   - Patient with a h/o CAD, CHF with preserved EF, DMII (on oral agent PTA).  Admitted 2/2 abdominal pain, critical hypokalemia.  - Decreased PO intakes x 1-2 days PTA.  Abdominal pain for months, worsened by PO intakes.   - Resides in independent senior apartment.  Noted  resides in memory care at this facility.    - Despite potential for pain to impact PO intakes as above, patient/family report 1 meal/day has been baseline for a period of \"at least a year\".    - Has trialed oral supplements and hates all options.  Wrinkled nose.  - Specifics hard to obtain, \"I'll ask you one question then you can leave\".  She is upset with most recent diet, see below.   - NKFA.      CURRENT NUTRITION ORDERS  Diet Order:     DD2 + thins --> now cancelled    Current Intake/Tolerance:  Previously on regular diet order.  SLP bedside swallow evaluation 2/16 - downgrade to DD2 + thins.  Mentation, lethargy, weakness, dysphagia, initially abdominal pain, decreased appetite all impacting oral intakes since admission.  Bites-75% meal consumption since admit.  Patient hates her DD2 diet.  \"Everything's ground, they told me soft\".        PHYSICAL FINDINGS  Observed  [place]  See below, exam limited per patient request  Obtained from Chart/Interdisciplinary Team  GI assess 2/13: EGD " "2/15 --> gastric ulcers and duodenitis  AMS AM of 2/15, code stroke: head CT unremarkable  RRT AM of 2/16: unresponsive  Neurology assess 2/16: brain MRI showing several new infarcts --> subacute stroke  Stooling patterns reviewed    ANTHROPOMETRICS  Height: 5' 6\"  Weight: 71.3 kg (157#)  Body mass index is 25.37 kg/m .  Weight Status:  Overweight BMI 25-29.9  IBW: 59.1 kg (130#)  % IBW: 121%  Weight History:  Wt Readings from Last 10 Encounters:   02/18/19 71.3 kg (157 lb 3.2 oz)   - Wt of 162# from 1/23/209.  Stability over the past month?  Patient is unsure of UBW.  Family in room confirm patient does not consistently weigh self in home setting.      LABS  Labs reviewed including BG trends during admit:  Lab Results   Component Value Date    A1C 6.3 02/12/2019       MEDICATIONS  Medications reviewed including insulin and diuretic      ASSESSED NUTRITION NEEDS PER APPROVED PRACTICE GUIDELINES:    Dosing Weight 62.2 kg - adjusted weight   Estimated Energy Needs: 1011-6016 kcals (25-30 Kcal/Kg)  Justification: maintenance  Estimated Protein Needs: 75-93 grams protein (1.2-1.8 g pro/Kg)  Justification: preservation of lean body mass, likely slight repletion needs  Estimated Fluid Needs: per MD  Justification: per MD    MALNUTRITION:  % Weight Loss: No weight loss noted over the past month or during admit  % Intake:  </= 50% for >/= 5 days (severe malnutrition)  Subcutaneous Fat Loss:  None observed --> in face  Muscle Loss:  Temporal region mild depletion --> in face, patient denied full exam  Fluid Retention:  None noted    Malnutrition Diagnosis: Non-Severe malnutrition  In Context of:  Acute illness or injury  Chronic illness or disease    NUTRITION DIAGNOSIS:  Inadequate oral intake related to decreased appetite, abdominal pain?, dislike of diet per SLP as evidenced by meeting <50% needs via bites-75% meal consumption x 7 days since admission, e/o muscle loss, coding of malnutrition.       NUTRITION " INTERVENTIONS  Recommendations / Nutrition Prescription  Diet per SLP/MD.    With advancement, ok for high protein smoothies or milkshakes.  Patient denied scheduled supplements.       Implementation  Nutrition education: Provided education on diet per SLP.  Reviewed common foods well-tolerated on DD2 diet.  Reviewed supplements and how to order.    Medical Food Supplement: As above.    Collaboration and Referral of Nutrition care: Discussed POC with team during rounds and diet concerns with RN.  Printed DD2 menu for patient.        Nutrition Goals  Patient to consume at least 50% of meals TID consistently or the equivalent with supplements to show improvement in PO intakes.       MONITORING AND EVALUATION:  Progress towards goals will be monitored and evaluated per protocol and Practice Guidelines        Sofi Schroeder RD, LD  Clinical Dietitian  3rd floor/ICU: [phone]  All other floors: [phone]  Weekend/holiday: [phone]

## 2019-02-18 NOTE — PLAN OF CARE
VSS. HR - oralia.   Tele; Afib, BBB, prolonged QT.  Pt up with ast x 1, gait belt and walker. Unsteady this morning.   BG 89, and again at 6am was 109.

## 2019-02-18 NOTE — PLAN OF CARE
Discharge Planner SLP   Patient plan for discharge: Did not discuss  Current status: pt tolerated thin liquids, puree, and regular solids. Mastication of regular solids is slow, but pt is able to clear oral cavity given sufficient time and using a liquid wash. Pt's intake has been poor (less than 50%) per dietician, pt reports this is secondary to the ground food.    Recommend regular solids and thin liquids - pt must be seated upright, take small bites/sips, slow rate of intake, chew carefully and alternate solids and liquids.     Barriers to return to prior living situation: Below baseline  Recommendations for discharge: ARU  Rationale for recommendations: SLP at next level of care        Entered by: Kady Randhawa 02/18/2019 3:05 PM

## 2019-02-19 ENCOUNTER — APPOINTMENT (OUTPATIENT)
Dept: CT IMAGING | Facility: CLINIC | Age: 79
DRG: 383 | End: 2019-02-19
Attending: INTERNAL MEDICINE
Payer: MEDICARE

## 2019-02-19 LAB
ALBUMIN UR-MCNC: NEGATIVE MG/DL
ANION GAP SERPL CALCULATED.3IONS-SCNC: 6 MMOL/L (ref 3–14)
APPEARANCE UR: CLEAR
BILIRUB UR QL STRIP: NEGATIVE
BUN SERPL-MCNC: 15 MG/DL (ref 7–30)
CALCIUM SERPL-MCNC: 8.8 MG/DL (ref 8.5–10.1)
CHLORIDE SERPL-SCNC: 103 MMOL/L (ref 94–109)
CO2 SERPL-SCNC: 29 MMOL/L (ref 20–32)
COLOR UR AUTO: ABNORMAL
CREAT SERPL-MCNC: 1.11 MG/DL (ref 0.52–1.04)
ERYTHROCYTE [DISTWIDTH] IN BLOOD BY AUTOMATED COUNT: 15.4 % (ref 10–15)
ERYTHROCYTE [DISTWIDTH] IN BLOOD BY AUTOMATED COUNT: 15.5 % (ref 10–15)
GFR SERPL CREATININE-BSD FRML MDRD: 47 ML/MIN/{1.73_M2}
GLUCOSE BLDC GLUCOMTR-MCNC: 107 MG/DL (ref 70–99)
GLUCOSE BLDC GLUCOMTR-MCNC: 115 MG/DL (ref 70–99)
GLUCOSE BLDC GLUCOMTR-MCNC: 119 MG/DL (ref 70–99)
GLUCOSE BLDC GLUCOMTR-MCNC: 121 MG/DL (ref 70–99)
GLUCOSE BLDC GLUCOMTR-MCNC: 79 MG/DL (ref 70–99)
GLUCOSE BLDC GLUCOMTR-MCNC: 87 MG/DL (ref 70–99)
GLUCOSE BLDC GLUCOMTR-MCNC: 91 MG/DL (ref 70–99)
GLUCOSE BLDC GLUCOMTR-MCNC: 93 MG/DL (ref 70–99)
GLUCOSE BLDC GLUCOMTR-MCNC: 97 MG/DL (ref 70–99)
GLUCOSE SERPL-MCNC: 89 MG/DL (ref 70–99)
GLUCOSE UR STRIP-MCNC: NEGATIVE MG/DL
HCT VFR BLD AUTO: 40.1 % (ref 35–47)
HCT VFR BLD AUTO: 40.6 % (ref 35–47)
HGB BLD-MCNC: 12.3 G/DL (ref 11.7–15.7)
HGB BLD-MCNC: 12.5 G/DL (ref 11.7–15.7)
HGB UR QL STRIP: NEGATIVE
KETONES UR STRIP-MCNC: NEGATIVE MG/DL
LEUKOCYTE ESTERASE UR QL STRIP: NEGATIVE
MCH RBC QN AUTO: 26.2 PG (ref 26.5–33)
MCH RBC QN AUTO: 26.7 PG (ref 26.5–33)
MCHC RBC AUTO-ENTMCNC: 30.7 G/DL (ref 31.5–36.5)
MCHC RBC AUTO-ENTMCNC: 30.8 G/DL (ref 31.5–36.5)
MCV RBC AUTO: 86 FL (ref 78–100)
MCV RBC AUTO: 87 FL (ref 78–100)
MRSA DNA SPEC QL NAA+PROBE: NEGATIVE
NITRATE UR QL: NEGATIVE
PH UR STRIP: 8 PH (ref 5–7)
PLATELET # BLD AUTO: 237 10E9/L (ref 150–450)
PLATELET # BLD AUTO: 243 10E9/L (ref 150–450)
POTASSIUM SERPL-SCNC: 3.8 MMOL/L (ref 3.4–5.3)
RBC # BLD AUTO: 4.68 10E12/L (ref 3.8–5.2)
RBC # BLD AUTO: 4.69 10E12/L (ref 3.8–5.2)
RBC #/AREA URNS AUTO: <1 /HPF (ref 0–2)
SODIUM SERPL-SCNC: 138 MMOL/L (ref 133–144)
SOURCE: ABNORMAL
SP GR UR STRIP: 1.02 (ref 1–1.03)
SPECIMEN SOURCE: NORMAL
SQUAMOUS #/AREA URNS AUTO: <1 /HPF (ref 0–1)
TROPONIN I SERPL-MCNC: <0.015 UG/L (ref 0–0.04)
UROBILINOGEN UR STRIP-MCNC: 0 MG/DL (ref 0–2)
WBC # BLD AUTO: 5.7 10E9/L (ref 4–11)
WBC # BLD AUTO: 6.1 10E9/L (ref 4–11)
WBC #/AREA URNS AUTO: <1 /HPF (ref 0–5)

## 2019-02-19 PROCEDURE — 87640 STAPH A DNA AMP PROBE: CPT | Performed by: INTERNAL MEDICINE

## 2019-02-19 PROCEDURE — 85027 COMPLETE CBC AUTOMATED: CPT | Performed by: STUDENT IN AN ORGANIZED HEALTH CARE EDUCATION/TRAINING PROGRAM

## 2019-02-19 PROCEDURE — 25000132 ZZH RX MED GY IP 250 OP 250 PS 637: Mod: GY | Performed by: INTERNAL MEDICINE

## 2019-02-19 PROCEDURE — A9270 NON-COVERED ITEM OR SERVICE: HCPCS | Mod: GY | Performed by: INTERNAL MEDICINE

## 2019-02-19 PROCEDURE — 36415 COLL VENOUS BLD VENIPUNCTURE: CPT | Performed by: INTERNAL MEDICINE

## 2019-02-19 PROCEDURE — 25000128 H RX IP 250 OP 636: Performed by: INTERNAL MEDICINE

## 2019-02-19 PROCEDURE — 95812 EEG 41-60 MINUTES: CPT

## 2019-02-19 PROCEDURE — 85027 COMPLETE CBC AUTOMATED: CPT | Performed by: INTERNAL MEDICINE

## 2019-02-19 PROCEDURE — 36415 COLL VENOUS BLD VENIPUNCTURE: CPT | Performed by: STUDENT IN AN ORGANIZED HEALTH CARE EDUCATION/TRAINING PROGRAM

## 2019-02-19 PROCEDURE — 81001 URINALYSIS AUTO W/SCOPE: CPT | Performed by: STUDENT IN AN ORGANIZED HEALTH CARE EDUCATION/TRAINING PROGRAM

## 2019-02-19 PROCEDURE — C9113 INJ PANTOPRAZOLE SODIUM, VIA: HCPCS | Performed by: INTERNAL MEDICINE

## 2019-02-19 PROCEDURE — 20000003 ZZH R&B ICU

## 2019-02-19 PROCEDURE — 70498 CT ANGIOGRAPHY NECK: CPT

## 2019-02-19 PROCEDURE — 99233 SBSQ HOSP IP/OBS HIGH 50: CPT | Performed by: STUDENT IN AN ORGANIZED HEALTH CARE EDUCATION/TRAINING PROGRAM

## 2019-02-19 PROCEDURE — 40000275 ZZH STATISTIC RCP TIME EA 10 MIN

## 2019-02-19 PROCEDURE — 00000146 ZZHCL STATISTIC GLUCOSE BY METER IP

## 2019-02-19 PROCEDURE — 25000132 ZZH RX MED GY IP 250 OP 250 PS 637: Mod: GY | Performed by: PHYSICIAN ASSISTANT

## 2019-02-19 PROCEDURE — G0407 INPT/TELE FOLLOW UP 25: HCPCS | Mod: G0 | Performed by: PSYCHIATRY & NEUROLOGY

## 2019-02-19 PROCEDURE — 70450 CT HEAD/BRAIN W/O DYE: CPT

## 2019-02-19 PROCEDURE — 87641 MR-STAPH DNA AMP PROBE: CPT | Performed by: INTERNAL MEDICINE

## 2019-02-19 PROCEDURE — 80048 BASIC METABOLIC PNL TOTAL CA: CPT | Performed by: INTERNAL MEDICINE

## 2019-02-19 PROCEDURE — A9270 NON-COVERED ITEM OR SERVICE: HCPCS | Mod: GY | Performed by: PHYSICIAN ASSISTANT

## 2019-02-19 PROCEDURE — 0042T CT HEAD PERFUSION WITH CONTRAST: CPT

## 2019-02-19 PROCEDURE — 84484 ASSAY OF TROPONIN QUANT: CPT | Performed by: STUDENT IN AN ORGANIZED HEALTH CARE EDUCATION/TRAINING PROGRAM

## 2019-02-19 RX ORDER — IOPAMIDOL 755 MG/ML
500 INJECTION, SOLUTION INTRAVASCULAR ONCE
Status: COMPLETED | OUTPATIENT
Start: 2019-02-19 | End: 2019-02-19

## 2019-02-19 RX ORDER — LABETALOL 20 MG/4 ML (5 MG/ML) INTRAVENOUS SYRINGE
10-40 EVERY 4 HOURS PRN
Status: DISCONTINUED | OUTPATIENT
Start: 2019-02-19 | End: 2019-02-19

## 2019-02-19 RX ORDER — LABETALOL 20 MG/4 ML (5 MG/ML) INTRAVENOUS SYRINGE
10 EVERY 4 HOURS PRN
Status: DISCONTINUED | OUTPATIENT
Start: 2019-02-19 | End: 2019-02-26 | Stop reason: HOSPADM

## 2019-02-19 RX ADMIN — CARVEDILOL 3.12 MG: 3.12 TABLET, FILM COATED ORAL at 09:28

## 2019-02-19 RX ADMIN — ACETAMINOPHEN 650 MG: 325 TABLET, FILM COATED ORAL at 20:14

## 2019-02-19 RX ADMIN — OXYBUTYNIN CHLORIDE 15 MG: 5 TABLET, EXTENDED RELEASE ORAL at 09:27

## 2019-02-19 RX ADMIN — FAMOTIDINE 20 MG: 20 TABLET ORAL at 09:27

## 2019-02-19 RX ADMIN — APIXABAN 5 MG: 5 TABLET, FILM COATED ORAL at 09:27

## 2019-02-19 RX ADMIN — IOPAMIDOL 120 ML: 755 INJECTION, SOLUTION INTRAVENOUS at 10:37

## 2019-02-19 RX ADMIN — ATORVASTATIN CALCIUM 20 MG: 20 TABLET, FILM COATED ORAL at 20:14

## 2019-02-19 RX ADMIN — ASPIRIN 81 MG 81 MG: 81 TABLET ORAL at 09:28

## 2019-02-19 RX ADMIN — PANTOPRAZOLE SODIUM 40 MG: 40 INJECTION, POWDER, FOR SOLUTION INTRAVENOUS at 09:29

## 2019-02-19 RX ADMIN — APIXABAN 5 MG: 5 TABLET, FILM COATED ORAL at 20:15

## 2019-02-19 RX ADMIN — CARVEDILOL 3.12 MG: 3.12 TABLET, FILM COATED ORAL at 18:03

## 2019-02-19 RX ADMIN — ONDANSETRON 4 MG: 2 INJECTION INTRAMUSCULAR; INTRAVENOUS at 14:34

## 2019-02-19 RX ADMIN — SODIUM CHLORIDE 80 ML: 9 INJECTION, SOLUTION INTRAVENOUS at 10:37

## 2019-02-19 ASSESSMENT — ACTIVITIES OF DAILY LIVING (ADL)
ADLS_ACUITY_SCORE: 16
ADLS_ACUITY_SCORE: 15
ADLS_ACUITY_SCORE: 15
ADLS_ACUITY_SCORE: 18
ADLS_ACUITY_SCORE: 17
ADLS_ACUITY_SCORE: 15

## 2019-02-19 ASSESSMENT — MIFFLIN-ST. JEOR
SCORE: 1172.12
SCORE: 1193.9

## 2019-02-19 NOTE — PROGRESS NOTES
[facility]    Medicine Progress Note - Hospitalist Service       Date of Admission:  2/11/2019  Date of Service: 02/19/2019    Assessment & Plan       Summary of Stay:  Rhonda Mathur is a 79 year old female with multiple medical history such as chronic atrial fibrillation on oral anticoagulation, CAD, chronic heart failure with preserved ejection fraction, hypertension, diabetes mellitus non-insulin-requiring, dyslipidemia, prior anemia, currently living independently at home and presented in the emergency room mostly due to increasing episodes of abdominal discomfort and pain leading to decreased oral intake at least for the past 1-2 days.       Hospital course: Patient was closely monitored for abdominal pain.  Her abdominal pain persisted and gastroenterology team was consulted to assist with further management.  Patient underwent endoscopy which revealed duodenitis and nonbleeding gastric ulcers with no stigmata of bleeding which was biopsied.  Patient was started on PPI and closely monitored.  Following endoscopy, patient became encephalopathic which is on and off which was initially attributed to benzodiazepine narcotic medication received for endoscopy.     Due to persistent encephalopathy, MRI was obtained which showed evidence of ischemic stroke which was felt to be embolic in nature and patient was started on Eliquis.  Echocardiogram with bubble study was negative but did demonstrate a small mobile filamentous echodensity noted on the aortic valve which could represent valvular calcification or a fibrinous strand versus a small papillary fibroelastoma.     On the morning of 2/18/19, patient was less responsive again.  CT of the head was negative for any hemorrhagic transformation.  Repeat MRI was unchanged.    Again had episode on 02/19 of becoming unresponsive lethargic. Stroke code was activated and CTA/CT head showed no acute occlusion/hemorrhagic conversion/ischemia. Transferred to ICU  amd patient's mental status slowly improved. Video EEG was obtained and read is pending.      Problem List/Plan:    1. CVA, felt to be thromboembolic in nature with history of chronic atrial fibrillation: Patient was started on Eliquis.  Negative head CT for hemorrhagic transformation and repeat MRI was unchanged.  Only recommended continuing Eliquis indefinitely and a baby aspirin for 7 days. Then Eliquis alone. Allow for some permissive hypertension with goal systolic blood pressure to be between 120-160.  2. Acute Encephalopathy: unclear etiology foe these episodes of AMS and unresponsiveness. Multiple imaging tests including MRI and CTA/CT head have not shown any new acute findings to explain these symptoms. Possible related to seizures? Video EEG is pending, discussed with stroke neuro, consider trial of AEDs if symptoms recurrent. Keep in ICU for today.     3. Hypertension: Coreg restarted a lower dose of 3.125 mg p.o. twice daily with parameters to hold if systolic blood pressure less than 120.  4. Abdominal pain: Patient initially presented with this.  Status post endoscopy with findings of nonbleeding gastric ulcer and duodenitis. Continue PPI.  5. History of CHF: Chronically on Bumex, lisinopril, and Coreg.  Bumex and lisinopril currently on hold for now.  6. Hypertension.  Continue Coreg in the setting of chronic atrial fibrillation.  7. Type 2 diabetes: Continue medium intensity sliding scale.  Metformin on hold for now.  8. Cardiac pauses: Otherwise asymptomatic.  Reducing Coreg to 3.125 mg p.o. twice daily for now.        DVT Prophylaxis: Eliquis  Code Status: DNR / DNI  Discharge Dispo: Suspect TCU versus acute rehab  Estimated Disch Date / # of Days until Disch: Anticipate 2-3 more days in the hospital until mental status is improved     Diet: NPO for Medical/Clinical Reasons Except for: Meds, Ice Chips    DVT Prophylaxis: Eliquis  Ramirez Catheter: not present  Code Status: DNR/DNI      Disposition  Plan   Expected discharge: 2 - 3 days, recommended to transitional care unit once mental status at baseline.  Entered: Leroy Cassidy MD 02/19/2019, 3:23 PM       The patient's care was discussed with the Bedside Nurse, Patient, Patient's Family and Stroke Neurologist Dr. Colvin.      Patient, family, interdisciplinary team involved in care and agrees with plan.  Total time - Greater than 35 min. More than 50% of time spent in direct patient care, care coordination, patient/caregiver counseling, and formalizing plan of care.     Leroy Cassidy MD  Hospitalist Service  [facility]    ______________________________________________________________________    Interval History     Rapid response called at 10:17  Nurse called into room by support staff, pt was in bathroom, and became slightly dizzy. She was guided to sit in bed and became stuporous and unresponsive not voicing any complaints. Did not arouse to sternal rub and code stroke was called.      Data reviewed today: I reviewed all medications, new labs and imaging results over the last 24 hours. I personally reviewed the head CT image(s) showing see below.    Physical Exam   Vital Signs: Temp: 98.6  F (37  C) Temp src: Oral BP: 112/66 Pulse: 69 Heart Rate: 67 Resp: 11 SpO2: 98 % O2 Device: None (Room air) Oxygen Delivery: 4 LPM  Weight: 157 lbs 0 oz     Constitutional: somnolent with decreased level of responsiveness but able to protect airway.   Respiratory: initially breathing was shallow and irregular on exam but later in day showed Nl work of breathing.  Diminished breath sounds at the bases   Cardiovascular:  Irregularly irregular, normal S1 and S2, and no murmur noted   Abdomen: Normal bowel sounds, soft, non-distended, non-tender   Skin: No rashes, no cyanosis, dry to touch   Neuro: Unable to assess due to altered mental status   Extremities: Trace edema, normal range of motion   HEENT Normocephalic, atraumatic, normal nasal turbinates; oropharynx  "clear   Neck Supple; nl inspection; trachea midline; no thryomegaly   Psychiatric: Decreased level of consciousness.  Unable to assess        Data   Recent Labs   Lab 02/19/19  1112 02/19/19  0659 02/18/19  0718 02/17/19  1620 02/17/19  0617 02/16/19  1521  02/16/19  0547  02/15/19  1750 02/15/19  1351   WBC 6.1 5.7 6.1  --  5.2  --   --   --    < >  --   --    HGB 12.5 12.3 12.6  --  12.4  --   --   --    < >  --   --    MCV 87 86 87  --  86  --   --   --    < >  --   --     243 266  --  226  --   --   --    < >  --   --    NA  --  138  --   --  137  --   --  134   < >  --   --    POTASSIUM  --  3.8 4.1 4.3 3.2*  --    < > 3.3*   < >  --   --    CHLORIDE  --  103  --   --  98  --   --  97   < >  --   --    CO2  --  29  --   --  32  --   --  32   < >  --   --    BUN  --  15  --   --  17  --   --  15   < >  --   --    CR  --  1.11*  --   --  1.23*  --   --  1.22*   < >  --   --    ANIONGAP  --  6  --   --  7  --   --  5   < >  --   --    CONRADO  --  8.8  --   --  9.1  --   --  8.6   < >  --   --    GLC  --  89  --   --  84  --   --  100*   < >  --   --    ALBUMIN  --   --   --   --   --  3.4  --   --   --   --   --    PROTTOTAL  --   --   --   --   --  6.8  --   --   --   --   --    BILITOTAL  --   --   --   --   --  0.5  --   --   --   --   --    ALKPHOS  --   --   --   --   --  72  --   --   --   --   --    ALT  --   --   --   --   --  13  --   --   --   --   --    AST  --   --   --   --   --  17  --   --   --   --   --    TROPI <0.015  --   --   --   --   --   --   --   --  <0.015 <0.015    < > = values in this interval not displayed.     Recent Results (from the past 24 hour(s))   CT Head w/o Contrast    Narrative    CT SCAN OF THE HEAD WITHOUT CONTRAST   2/19/2019 10:41 AM     HISTORY: \"Code Stroke\".    TECHNIQUE:  Axial images of the head and coronal reformations without  IV contrast material. Radiation dose for this scan was reduced using  automated exposure control, adjustment of the mA and/or kV " "according  to patient size, or iterative reconstruction technique.    COMPARISON: Brain MR 2/18/2019. Head CT 2/18/2019.    FINDINGS: No evidence of acute intracranial hemorrhage. No mass effect  or midline shift. There is mild patchy periventricular white matter  hypodensities that are nonspecific but likely related to chronic  microvascular ischemic disease. Ventricular size is within normal  limits without evidence of hydrocephalus. Scattered areas of  subarachnoid calcifications in the right cerebral hemisphere are  unchanged.    The visualized portions of the sinuses and mastoids appear normal. The  bony calvarium and bones of the skull base appear intact.       Impression    IMPRESSION:     1. No evidence of acute intracranial hemorrhage, mass, or herniation.  2. No significant change of multiple calcifications throughout the  right cerebral hemisphere representing age-indeterminate calcified  emboli in the right middle cerebral artery territory.    SHELLY HUFFMAN MD   CTA Head Neck with Contrast    Narrative    CT ANGIOGRAM OF THE HEAD AND NECK WITH CONTRAST  CT HEAD PERFUSION WITH CONTRAST  2/19/2019 10:46 AM     HISTORY: \"Code Stroke\"     TECHNIQUE:  CT angiography with an injection of 70mL Isovue-370  (accession [id number]), 50mL Isovue-370 (accession [id number]) IV with  scans through the head and neck. Images were transferred to a separate  3-D workstation where multiplanar reformations and 3-D images were  created. Estimates of carotid stenoses are made relative to the distal  internal carotid artery diameters except as noted. Radiation dose for  this scan was reduced using automated exposure control, adjustment of  the mA and/or kV according to patient size, or iterative  reconstruction technique.     Perfusion scans were performed at three levels with injection of  additional IV nonionic contrast and saline flush.  These images were  processed on a separate 3-D workstation.     COMPARISON: CTA head and " neck 2/16/2019.     CT HEAD FINDINGS: No contrast enhancing lesions. CT perfusion images  demonstrates subtle increase in time to drain as well as T-max in the  right MCA territory. No obvious decreased cerebral blood volume or  cerebral blood flow.     CT ANGIOGRAM HEAD FINDINGS: No definite vascular cutoff of the  proximal major intracranial arteries. Again seen are two separate  dense areas of calcification of the M3 branches of the right MCA in  the sylvian fissure. The M3 branches distal to these calcifications  are again patent but may be slightly small in size. No other definite  vascular cutoff is appreciated, although the more distal right M3 and  M4 branches are at or below the spatial resolution of CTA.    There is calcified atherosclerotic disease of the cavernous and  supraclinoid carotid arteries resulting in moderate stenosis on the  right and mild stenosis on the left. No aneurysm is appreciated.    CT ANGIOGRAM NECK FINDINGS:   Marked calcified atherosclerotic disease at the aortic arch and  origins of the great vessels. There is mild-to-moderate stenosis of  the origins of the great vessels from the aortic arch.    Right carotid artery: The right common and internal carotid arteries  are patent. Sequela of previous right carotid endarterectomy with  multiple surgical clips around the right carotid artery. Scattered  soft and calcified plaque in the right common carotid artery and  proximal internal carotid artery. No significant stenosis. The right  carotid bifurcation is widely patent.     Left carotid artery: The left common and internal carotid arteries are  patent. Scattered calcification throughout the carotid artery and the  common carotid artery, carotid bifurcation, and proximal internal  carotid artery. This results in approximately 59% stenosis by NASCET  criteria.     Vertebral arteries: The left vertebral artery is patent throughout its  course without significant stenosis. The right  "vertebral artery is  patent at its origin. Right vertebral artery is smaller than the left.  There is gradual decrease of opacification of the distal V2 and V3  segments of the vertebral artery, although the opacification is  improved compared to prior CTA. This could represent an  age-indeterminate dissection.    Other findings: Heterogeneous 1.7 cm right thyroid gland nodule.       Impression    IMPRESSION:   1. No definite vascular cutoff of the proximal major intracranial  arteries. Dense calcifications within the right M3 branches are again  seen likely due to age-indeterminate calcified emboli or calcified  atherosclerotic disease.  2. CT perfusion images demonstrate subtle abnormality in the right MCA  territory which could represent ischemia. The previously visualized  cortical infarcts on brain MRI are not appreciated.  3. Again seen is decreased opacification of the distal right vertebral  artery in its V2 and V3 segments, possibly due to dissection although  the opacification is improved since prior CTA. The improvement in  opacification could be due to differences in timing of the CTA versus  improved flow in the vertebral artery.  4. Marked calcified atherosclerotic disease at the aortic arch causing  mild-to-moderate stenosis at the origins of the great vessels.  5. Atherosclerotic disease in the carotid arteries bilaterally. There  is approximately 59% stenosis of the left carotid artery by NASCET  criteria. Sequela of previous right carotid endarterectomy without  significant residual stenosis.  6. Heterogeneous 1.7 cm right thyroid gland nodule. Recommend further  evaluation with thyroid ultrasound on a nonemergent basis.    Results discussed with nurse Jessica Dillon at 11:07 AM on 2/19/2019.     SHELLY HUFFMAN MD   CT Head Perfusion w Contrast    Narrative    CT ANGIOGRAM OF THE HEAD AND NECK WITH CONTRAST  CT HEAD PERFUSION WITH CONTRAST  2/19/2019 10:46 AM     HISTORY: \"Code Stroke\"     TECHNIQUE: "  CT angiography with an injection of 70mL Isovue-370  (accession [id number]), 50mL Isovue-370 (accession [id number]) IV with  scans through the head and neck. Images were transferred to a separate  3-D workstation where multiplanar reformations and 3-D images were  created. Estimates of carotid stenoses are made relative to the distal  internal carotid artery diameters except as noted. Radiation dose for  this scan was reduced using automated exposure control, adjustment of  the mA and/or kV according to patient size, or iterative  reconstruction technique.     Perfusion scans were performed at three levels with injection of  additional IV nonionic contrast and saline flush.  These images were  processed on a separate 3-D workstation.     COMPARISON: CTA head and neck 2/16/2019.     CT HEAD FINDINGS: No contrast enhancing lesions. CT perfusion images  demonstrates subtle increase in time to drain as well as T-max in the  right MCA territory. No obvious decreased cerebral blood volume or  cerebral blood flow.     CT ANGIOGRAM HEAD FINDINGS: No definite vascular cutoff of the  proximal major intracranial arteries. Again seen are two separate  dense areas of calcification of the M3 branches of the right MCA in  the sylvian fissure. The M3 branches distal to these calcifications  are again patent but may be slightly small in size. No other definite  vascular cutoff is appreciated, although the more distal right M3 and  M4 branches are at or below the spatial resolution of CTA.    There is calcified atherosclerotic disease of the cavernous and  supraclinoid carotid arteries resulting in moderate stenosis on the  right and mild stenosis on the left. No aneurysm is appreciated.    CT ANGIOGRAM NECK FINDINGS:   Marked calcified atherosclerotic disease at the aortic arch and  origins of the great vessels. There is mild-to-moderate stenosis of  the origins of the great vessels from the aortic arch.    Right carotid artery: The  right common and internal carotid arteries  are patent. Sequela of previous right carotid endarterectomy with  multiple surgical clips around the right carotid artery. Scattered  soft and calcified plaque in the right common carotid artery and  proximal internal carotid artery. No significant stenosis. The right  carotid bifurcation is widely patent.     Left carotid artery: The left common and internal carotid arteries are  patent. Scattered calcification throughout the carotid artery and the  common carotid artery, carotid bifurcation, and proximal internal  carotid artery. This results in approximately 59% stenosis by NASCET  criteria.     Vertebral arteries: The left vertebral artery is patent throughout its  course without significant stenosis. The right vertebral artery is  patent at its origin. Right vertebral artery is smaller than the left.  There is gradual decrease of opacification of the distal V2 and V3  segments of the vertebral artery, although the opacification is  improved compared to prior CTA. This could represent an  age-indeterminate dissection.    Other findings: Heterogeneous 1.7 cm right thyroid gland nodule.       Impression    IMPRESSION:   1. No definite vascular cutoff of the proximal major intracranial  arteries. Dense calcifications within the right M3 branches are again  seen likely due to age-indeterminate calcified emboli or calcified  atherosclerotic disease.  2. CT perfusion images demonstrate subtle abnormality in the right MCA  territory which could represent ischemia. The previously visualized  cortical infarcts on brain MRI are not appreciated.  3. Again seen is decreased opacification of the distal right vertebral  artery in its V2 and V3 segments, possibly due to dissection although  the opacification is improved since prior CTA. The improvement in  opacification could be due to differences in timing of the CTA versus  improved flow in the vertebral artery.  4. Marked  calcified atherosclerotic disease at the aortic arch causing  mild-to-moderate stenosis at the origins of the great vessels.  5. Atherosclerotic disease in the carotid arteries bilaterally. There  is approximately 59% stenosis of the left carotid artery by NASCET  criteria. Sequela of previous right carotid endarterectomy without  significant residual stenosis.  6. Heterogeneous 1.7 cm right thyroid gland nodule. Recommend further  evaluation with thyroid ultrasound on a nonemergent basis.    Results discussed with nurse Jessica Dillon at 11:07 AM on 2/19/2019.     SHELLY HUFFMAN MD     Medications     - MEDICATION INSTRUCTIONS -       - MEDICATION INSTRUCTIONS -         apixaban ANTICOAGULANT  5 mg Oral BID     aspirin  81 mg Oral Daily     atorvastatin  20 mg Oral QPM     carvedilol  3.125 mg Oral BID w/meals     famotidine  20 mg Oral Daily     insulin aspart  1-6 Units Subcutaneous Q4H     oxybutynin ER  15 mg Oral Daily     pantoprazole (PROTONIX) IV  40 mg Intravenous BID

## 2019-02-19 NOTE — PLAN OF CARE
A&O x 4, assist x 1, regular diet,  & 91, tele A Fib CVR w/BBB & prolonged QT, several pauses through out shift, neuro checks q4 hrs, slight difference between strength left  vs. Right , will continue with POC.

## 2019-02-19 NOTE — PLAN OF CARE
Pt alert and oriented. Assist with one and walker GB.  Tele-A-fib, CVR BBB, prolonged QT  Neuros intact.   C/O mild pain, declines Tylenol.   Plan to discharge home in 2-3 days.

## 2019-02-19 NOTE — PLAN OF CARE
Rapid response called at 10:17  Nurse called into room by support staff, pt was in bathroom, got slightly dizzy. Guided to sit in bed. Became unresponsive.   Nurse came in. Vitals being done. Called RR.    MD ordered CT, CTA. Float  New IV placed. Pt returned from CT, Still unresponsive, Tele stroke called and in done. Pt then transferred to ICU.

## 2019-02-19 NOTE — PROGRESS NOTES
"Approximately 1400 pt starting to arouse a bit. MD, Dr. Cassidy present at bedside. Pt moving head spontaneously and following some commands. Pt attempting to communicate by using \"yes\" and \"no\" answers. Pt intermittently cooperates with exam. Squeezes right hand, does not on left. Does not move LEs to command during RN assessment. Visual fields difficult to assess as pt not cooperating with exam. Does attempt to look at RN and follow voice. Pt states \"yes\" when asked if nausea as pt attempting to dry heave. Zofran given per order. Pt resting comfortably with family at bedside. No change in vital signs. See flowsheet for additional assessment information.  "

## 2019-02-19 NOTE — PLAN OF CARE
VSS, afebrile, RA sats mid 90's.  Pain in back and headache, decreased with Tylenol.  Tele Afib, CVR.  Yobani Regular diet with no swallowing difficulty, appetite poor.  Disoriented when first waking up, otherwise A&Ox4.  Up to chair/BSC with a of 1.   POC reivewed with multiple family members, questions answered.

## 2019-02-19 NOTE — PROGRESS NOTES
Vascular Neurology Progress Note      Chief complaint: Abdominal Pain      Overnight events: Patient was unresponsive this morning after breakfast. Was witnessed by family. Walked to BR and then reported she felt light headed, walked back to bed, then laid down and was unresponsive and not talking. No twitching or abnormal movements seen by family. She had an episode of this yesterday as well and it resolved completely.      The patient was previously seen by my colleague Dr. Drake on 2/16 and 2/17 for right-side infarcts and he recommended continuing anticoagulation.     Impression/Plan:  Ischemic Stroke due to large-artery atherosclerosis (embolus/thrombosis). Atheroembolism from severe aortic arch plaque vs less likely atrial fibrillation    Episode of unresponsiveness of unclear etiology. Not seizure as pt had EEG while still encephalopathic. No explanation on imaging for the episodes. Would consider conversion disorder in the differential. Continue to observe. This afternoon, EEG 1 hr read by Dr. Telles (who discussed with Dr. Colvin) reports no seizures and there were no findings on the EEG to explain her unresponsiveness. Nursing notes in late afternoon indicate patient is now up and walking.    Recommendations:  - Continue anticoagulation with Eliquis  - Add aspirin 81mg for only 7 days and then stop (due to severe aortic arch disease)  - Continue statin. Goal LDL 40-70. Titrate with PCP  - Please recall neurology if patient has any further episodes of unresponsiveness. No further inpatient workup needed    Patient follow up:  - in the next 1 week(s) with PCP  - in 4-6 weeks with local neurologist    Racheal Hill PA-C  Neurology  02/19/2019 5:07 PM  Pager: [phone]    ____________________________________________________________________      Medications    Scheduled medications    apixaban ANTICOAGULANT  5 mg Oral BID     aspirin  81 mg Oral Daily     atorvastatin  20 mg Oral QPM     carvedilol  3.125 mg  Oral BID w/meals     famotidine  20 mg Oral Daily     insulin aspart  1-6 Units Subcutaneous Q4H     oxybutynin ER  15 mg Oral Daily     pantoprazole (PROTONIX) IV  40 mg Intravenous BID       Infusion medications    - MEDICATION INSTRUCTIONS -       - MEDICATION INSTRUCTIONS -         PRN medications  Current Facility-Administered Medications   Medication Dose Route Frequency     acetaminophen  650 mg Oral Q4H PRN    Or     acetaminophen  650 mg Rectal Q4H PRN     albuterol  2-4 puff Inhalation Q4H PRN     glucose  15-30 g Oral Q15 Min PRN    Or     dextrose  25-50 mL Intravenous Q15 Min PRN    Or     glucagon  1 mg Subcutaneous Q15 Min PRN     ibuprofen  400 mg Oral Q6H PRN     labetalol  10 mg Intravenous Q4H PRN     lidocaine viscous 2% 15 mL and maalox/mylanta w/ simethicone 15 mL (GI COCKTAIL)  30 mL Oral TID PRN     magnesium sulfate  4 g Intravenous Q4H PRN     - MEDICATION INSTRUCTIONS -   Does not apply Continuous PRN     melatonin  1 mg Oral At Bedtime PRN     metoclopramide  5 mg Oral Q6H PRN    Or     metoclopramide  5 mg Intravenous Q6H PRN     naloxone  0.1-0.4 mg Intravenous Q2 Min PRN     ondansetron  4 mg Oral Q6H PRN    Or     ondansetron  4 mg Intravenous Q6H PRN     ondansetron  4 mg Oral Q6H PRN    Or     ondansetron  4 mg Intravenous Q6H PRN     - MEDICATION INSTRUCTIONS -   Does not apply Continuous PRN     potassium chloride  20-40 mEq Oral or Feeding Tube Q2H PRN     potassium chloride with lidocaine  10 mEq Intravenous Q1H PRN     potassium chloride  10 mEq Intravenous Q1H PRN     potassium chloride  20 mEq Intravenous Q1H PRN     potassium chloride  20-40 mEq Oral Q2H PRN     prochlorperazine  5 mg Intravenous Q6H PRN    Or     prochlorperazine  5 mg Oral Q6H PRN    Or     prochlorperazine  12.5 mg Rectal Q12H PRN     senna-docusate  1 tablet Oral BID PRN    Or     senna-docusate  2 tablet Oral BID PRN     sodium chloride (PF)  3 mL Intravenous q1 min prn       Allergies  No Known  Allergies      Physical Examination    Vital Signs:  Temp:  [96.2  F (35.7  C)-98.6  F (37  C)] 98.5  F (36.9  C)  Pulse:  [61-88] 61  Heart Rate:  [59-73] 66  Resp:  [8-23] 8  BP: (112-197)/() 137/71  SpO2:  [93 %-100 %] 100 %     Neuro:  Mental status: unresponsive. Follows no commands. Eyes closed  Cranial nerves:  keeps eyes closed, symmetric grimace, does not protrude tongue  Motor: no movement seen  Reflexes: unable to test due to telestroke  Sensory: minimal grimace to sternal rub. No withdraw to pain in any extremity  Coordination: unable to test. unresponsive  Gait: unable to test. unresponsive        Labs/Studies:  CBC:     Recent Labs   Lab 02/19/19  1112 02/19/19  0659 02/18/19  0718   WBC 6.1 5.7 6.1   RBC 4.68 4.69 4.67   HGB 12.5 12.3 12.6   HCT 40.6 40.1 40.6    243 266     Basic Metabolic Panel:   Recent Labs   Lab 02/19/19  0659 02/18/19  0718 02/17/19  1620 02/17/19  0617  02/16/19  0547     --   --  137  --  134   POTASSIUM 3.8 4.1 4.3 3.2*   < > 3.3*   CHLORIDE 103  --   --  98  --  97   CO2 29  --   --  32  --  32   BUN 15  --   --  17  --  15   CR 1.11*  --   --  1.23*  --  1.22*   GLC 89  --   --  84  --  100*   CONRADO 8.8  --   --  9.1  --  8.6    < > = values in this interval not displayed.     Liver panel:  Recent Labs   Lab Test 02/16/19  1521 02/11/19  1917   PROTTOTAL 6.8 6.9   ALBUMIN 3.4 3.7   BILITOTAL 0.5 0.4   ALKPHOS 72 77   AST 17 18   ALT 13 15     INR:No lab results found.   Lipid Profile:  Recent Labs   Lab Test 02/16/19  1521   CHOL 151   HDL 35*   LDL 78   TRIG 189*     A1C:   Recent Labs   Lab Test 02/12/19  0734   A1C 6.3*     Troponin I:   Recent Labs   Lab 02/19/19  1112 02/15/19  1750 02/15/19  1351   TROPI <0.015 <0.015 <0.015         Imaging:  I personally reviewed all of the following studies:    CT head without contrast 10:41 am:  IMPRESSION:     1. No evidence of acute intracranial hemorrhage, mass, or herniation.  2. No significant change of  multiple calcifications throughout the  right cerebral hemisphere representing age-indeterminate calcified  emboli in the right middle cerebral artery territory.    CT-P, CTA head/neck:  IMPRESSION:   1. No definite vascular cutoff of the proximal major intracranial  arteries. Dense calcifications within the right M3 branches are again  seen likely due to age-indeterminate calcified emboli or calcified  atherosclerotic disease.  2. CT perfusion images demonstrate subtle abnormality in the right MCA  territory which could represent ischemia. The previously visualized  cortical infarcts on brain MRI are not appreciated.  3. Again seen is decreased opacification of the distal right vertebral  artery in its V2 and V3 segments, possibly due to dissection although  the opacification is improved since prior CTA. The improvement in  opacification could be due to differences in timing of the CTA versus  improved flow in the vertebral artery.  4. Marked calcified atherosclerotic disease at the aortic arch causing  mild-to-moderate stenosis at the origins of the great vessels.  5. Atherosclerotic disease in the carotid arteries bilaterally. There  is approximately 59% stenosis of the left carotid artery by NASCET  criteria. Sequela of previous right carotid endarterectomy without  significant residual stenosis.  6. Heterogeneous 1.7 cm right thyroid gland nodule. Recommend further  evaluation with thyroid ultrasound on a nonemergent basis.        I have personally spent a total of 60 minutes providing care and consulting with this patient's medical providers today, with more than 50% of this time spent in consultation, coordination of care, and discussion with the patient and/or family regarding diagnostic results, prognosis, symptom management, risks and benefits of management options, and development of plan of care.     Telestroke Service Details  (for non-emergent stroke consult with tele)  Tele service began 02/19/19    1100   Tele service ended 02/19/19   1120   Type of service telemedicine diagnostic assessment of acute neurological changes   Reason telemedicine is appropriate patient requires assessment with a specialist for diagnosis and treatment of neurological symptoms   Mode of transmission secure interactive audio and video communication per [place]   Originating site (patient location) [facility]    Distant site (provider location) [facility]

## 2019-02-19 NOTE — PLAN OF CARE
OT: Session attempted; 1st attempt - pt eating breakfast    2nd attempt - working with another provider.     Addendum - pt with change in medical status, now transferred to ICU. Not appropriate for therapy at this time.

## 2019-02-19 NOTE — PROGRESS NOTES
**Sw is aware of consult ordered on 2/17/18.      Brief Sw Note:    Sw is following the pt along with CC.  Pt has been accepted at [facility] when discharge ready.  CC arranged this yesterday.  Sw is available as needed.

## 2019-02-19 NOTE — PLAN OF CARE
ICU End of Shift Summary.  For vital signs and complete assessments, please see documentation flowsheets.     Pertinent assessments: tele is afib cvr, on RA sats in upper 90s to 100%. No c/o pain. A&ox4. Neuropathy in lower legs and feet at baseline.   Major Shift Events: Passed bedside swallow study, diet advanced as tolerated. Up to bsc with walker and assist of one with gait belt. No nausea reported.  Plan (Upcoming Events): stay in icu overnight  Discharge/Transfer Needs: tbd    Bedside Shift Report Completed : y  Bedside Safety Check Completed:y

## 2019-02-19 NOTE — PROGRESS NOTES
STAT EEG w/ Video ordered.  One hour EEG w/ video done.  #[id number]  Notified [facility] to read.

## 2019-02-19 NOTE — PROGRESS NOTES
Pt received into room 362 s/p RR for decreased LOC and stroke code was called. Pt obtunded and unresponsive upon arrival to unit. Breathing was adequate but shallow and irregular at times. Neuro exam performed. Pt does open eyes to pain and occasionally voice but not following any commands, no attempt to communicate and no movement noted in any extremities per neuro exam. No withdrawal or response to pain stimuli. Pupils are round and equally reactive with somewhat of a downward congugate gaze. Pt rhythm a-fib with HR in 60's. Vitals stable with permissible HTN. Neuro consulted and performed exam per tele stroke. EEG ordered and tech at bedside. Family present at bedside. See flowsheet for additional assessment information.

## 2019-02-20 ENCOUNTER — APPOINTMENT (OUTPATIENT)
Dept: PHYSICAL THERAPY | Facility: CLINIC | Age: 79
DRG: 383 | End: 2019-02-20
Payer: MEDICARE

## 2019-02-20 ENCOUNTER — APPOINTMENT (OUTPATIENT)
Dept: OCCUPATIONAL THERAPY | Facility: CLINIC | Age: 79
DRG: 383 | End: 2019-02-20
Payer: MEDICARE

## 2019-02-20 LAB
ANION GAP SERPL CALCULATED.3IONS-SCNC: 6 MMOL/L (ref 3–14)
BUN SERPL-MCNC: 14 MG/DL (ref 7–30)
CALCIUM SERPL-MCNC: 8.8 MG/DL (ref 8.5–10.1)
CHLORIDE SERPL-SCNC: 104 MMOL/L (ref 94–109)
CO2 SERPL-SCNC: 27 MMOL/L (ref 20–32)
CREAT SERPL-MCNC: 1.06 MG/DL (ref 0.52–1.04)
CRP SERPL-MCNC: <2.9 MG/L (ref 0–8)
ERYTHROCYTE [DISTWIDTH] IN BLOOD BY AUTOMATED COUNT: 15.3 % (ref 10–15)
ERYTHROCYTE [SEDIMENTATION RATE] IN BLOOD BY WESTERGREN METHOD: 7 MM/H (ref 0–30)
GFR SERPL CREATININE-BSD FRML MDRD: 49 ML/MIN/{1.73_M2}
GLUCOSE BLDC GLUCOMTR-MCNC: 105 MG/DL (ref 70–99)
GLUCOSE BLDC GLUCOMTR-MCNC: 108 MG/DL (ref 70–99)
GLUCOSE BLDC GLUCOMTR-MCNC: 113 MG/DL (ref 70–99)
GLUCOSE BLDC GLUCOMTR-MCNC: 83 MG/DL (ref 70–99)
GLUCOSE BLDC GLUCOMTR-MCNC: 94 MG/DL (ref 70–99)
GLUCOSE BLDC GLUCOMTR-MCNC: 95 MG/DL (ref 70–99)
GLUCOSE BLDC GLUCOMTR-MCNC: 98 MG/DL (ref 70–99)
GLUCOSE SERPL-MCNC: 86 MG/DL (ref 70–99)
HCT VFR BLD AUTO: 41 % (ref 35–47)
HGB BLD-MCNC: 12.6 G/DL (ref 11.7–15.7)
MCH RBC QN AUTO: 26.5 PG (ref 26.5–33)
MCHC RBC AUTO-ENTMCNC: 30.7 G/DL (ref 31.5–36.5)
MCV RBC AUTO: 86 FL (ref 78–100)
PLATELET # BLD AUTO: 222 10E9/L (ref 150–450)
POTASSIUM SERPL-SCNC: 3.8 MMOL/L (ref 3.4–5.3)
RBC # BLD AUTO: 4.76 10E12/L (ref 3.8–5.2)
SODIUM SERPL-SCNC: 137 MMOL/L (ref 133–144)
WBC # BLD AUTO: 5.9 10E9/L (ref 4–11)

## 2019-02-20 PROCEDURE — A9270 NON-COVERED ITEM OR SERVICE: HCPCS | Mod: GY | Performed by: INTERNAL MEDICINE

## 2019-02-20 PROCEDURE — 97535 SELF CARE MNGMENT TRAINING: CPT | Mod: GO

## 2019-02-20 PROCEDURE — 25000132 ZZH RX MED GY IP 250 OP 250 PS 637: Mod: GY | Performed by: PSYCHIATRY & NEUROLOGY

## 2019-02-20 PROCEDURE — 85027 COMPLETE CBC AUTOMATED: CPT | Performed by: STUDENT IN AN ORGANIZED HEALTH CARE EDUCATION/TRAINING PROGRAM

## 2019-02-20 PROCEDURE — 97530 THERAPEUTIC ACTIVITIES: CPT | Mod: GO

## 2019-02-20 PROCEDURE — A9270 NON-COVERED ITEM OR SERVICE: HCPCS | Mod: GY | Performed by: PHYSICIAN ASSISTANT

## 2019-02-20 PROCEDURE — 97110 THERAPEUTIC EXERCISES: CPT | Mod: GP

## 2019-02-20 PROCEDURE — 25000128 H RX IP 250 OP 636: Performed by: INTERNAL MEDICINE

## 2019-02-20 PROCEDURE — 99207 ZZC CDG-MDM COMPONENT: MEETS MODERATE - UP CODED: CPT | Performed by: INTERNAL MEDICINE

## 2019-02-20 PROCEDURE — A9270 NON-COVERED ITEM OR SERVICE: HCPCS | Mod: GY | Performed by: PSYCHIATRY & NEUROLOGY

## 2019-02-20 PROCEDURE — 99233 SBSQ HOSP IP/OBS HIGH 50: CPT | Performed by: INTERNAL MEDICINE

## 2019-02-20 PROCEDURE — C9113 INJ PANTOPRAZOLE SODIUM, VIA: HCPCS | Performed by: INTERNAL MEDICINE

## 2019-02-20 PROCEDURE — 85652 RBC SED RATE AUTOMATED: CPT | Performed by: STUDENT IN AN ORGANIZED HEALTH CARE EDUCATION/TRAINING PROGRAM

## 2019-02-20 PROCEDURE — 86140 C-REACTIVE PROTEIN: CPT | Performed by: STUDENT IN AN ORGANIZED HEALTH CARE EDUCATION/TRAINING PROGRAM

## 2019-02-20 PROCEDURE — 97116 GAIT TRAINING THERAPY: CPT | Mod: GP

## 2019-02-20 PROCEDURE — 36415 COLL VENOUS BLD VENIPUNCTURE: CPT | Performed by: STUDENT IN AN ORGANIZED HEALTH CARE EDUCATION/TRAINING PROGRAM

## 2019-02-20 PROCEDURE — 12000000 ZZH R&B MED SURG/OB

## 2019-02-20 PROCEDURE — 80048 BASIC METABOLIC PNL TOTAL CA: CPT | Performed by: STUDENT IN AN ORGANIZED HEALTH CARE EDUCATION/TRAINING PROGRAM

## 2019-02-20 PROCEDURE — 25000132 ZZH RX MED GY IP 250 OP 250 PS 637: Mod: GY | Performed by: INTERNAL MEDICINE

## 2019-02-20 PROCEDURE — 25000132 ZZH RX MED GY IP 250 OP 250 PS 637: Mod: GY | Performed by: PHYSICIAN ASSISTANT

## 2019-02-20 PROCEDURE — 00000146 ZZHCL STATISTIC GLUCOSE BY METER IP

## 2019-02-20 RX ORDER — DONEPEZIL HYDROCHLORIDE 5 MG/1
5 TABLET, FILM COATED ORAL AT BEDTIME
Status: DISCONTINUED | OUTPATIENT
Start: 2019-02-20 | End: 2019-02-25

## 2019-02-20 RX ORDER — PANTOPRAZOLE SODIUM 40 MG/1
40 TABLET, DELAYED RELEASE ORAL 2 TIMES DAILY
Status: DISCONTINUED | OUTPATIENT
Start: 2019-02-20 | End: 2019-02-26 | Stop reason: HOSPADM

## 2019-02-20 RX ORDER — MEMANTINE HYDROCHLORIDE 5 MG/1
5 TABLET ORAL DAILY
Status: DISCONTINUED | OUTPATIENT
Start: 2019-02-21 | End: 2019-02-26 | Stop reason: HOSPADM

## 2019-02-20 RX ADMIN — OXYBUTYNIN CHLORIDE 15 MG: 5 TABLET, EXTENDED RELEASE ORAL at 08:55

## 2019-02-20 RX ADMIN — DONEPEZIL HYDROCHLORIDE 5 MG: 5 TABLET ORAL at 21:23

## 2019-02-20 RX ADMIN — PANTOPRAZOLE SODIUM 40 MG: 40 INJECTION, POWDER, FOR SOLUTION INTRAVENOUS at 08:55

## 2019-02-20 RX ADMIN — APIXABAN 5 MG: 5 TABLET, FILM COATED ORAL at 21:05

## 2019-02-20 RX ADMIN — ATORVASTATIN CALCIUM 20 MG: 20 TABLET, FILM COATED ORAL at 21:05

## 2019-02-20 RX ADMIN — PANTOPRAZOLE SODIUM 40 MG: 40 TABLET, DELAYED RELEASE ORAL at 21:05

## 2019-02-20 RX ADMIN — ASPIRIN 81 MG 81 MG: 81 TABLET ORAL at 08:55

## 2019-02-20 RX ADMIN — APIXABAN 5 MG: 5 TABLET, FILM COATED ORAL at 08:54

## 2019-02-20 RX ADMIN — CARVEDILOL 3.12 MG: 3.12 TABLET, FILM COATED ORAL at 09:28

## 2019-02-20 ASSESSMENT — ACTIVITIES OF DAILY LIVING (ADL)
ADLS_ACUITY_SCORE: 15

## 2019-02-20 NOTE — PLAN OF CARE
Discharge Planner PT   Patient plan for discharge: States she is going to discharge to a rehab   Current status: Patient transferred to ICU after unresponsive episode. PT POC and goals still remain appropriate. Patient in chair upon arrival of therapist, agreeable to working with PT. BP pre-activity 93/63. Sit<>stand from chair throughout session with FWW and Min A to CGA, needing a few attempts to achieve full standing each time. VCs for hand placement to initiate transfer. Patient ambulated 20 feet x 2 with FWW and Min A for balance support and walker management. Slow, unsteady, reciprocal gait noted. Gait noted to be effortful with patient requiring increased attention to complete tasks; varied step length and base of support noted throughout. Often varying from scissoring gait to WBOS. Patient required seated rest break before completing again. Completed seated strengthening exercises with cues for technique, patient tolerating well. BP post-activity 118/68. Patient in chair at end of session with all needs in reach. RN aware of session.   Barriers to return to prior living situation: Current level of A, decreased balance, decreased tolerance to activity   Recommendations for discharge: ARU   Rationale for recommendations: Patient previously independent at baseline and now requiring increased level of A for all mobility. Patient would benefit from continued intensive skilled therapy to further improve strength and balance deficits as well as address functional limitations to decrease risk of falls and improve overall mobility and ambulation prior to safe discharge home.        Entered by: Lacy Manjarrez 02/20/2019 1:55 PM

## 2019-02-20 NOTE — PROGRESS NOTES
"SPIRITUAL HEALTH SERVICES Progress Note  [facility] ICU    SH f/u per pt's change in medical status and now in ICU. Met with pt, Rhonda, and her niece, Mary.   Rhonda is sitting up in chair and slow to respond to questions. She voices the following:    Desire to \"go home and see my .\"     Shared memories of shoveling snow together with her spouse, Castillo.    Love of her children.     Pt does not express specific needs other than her desire to go home. She is inquiring about getting a ride home. Noted that SW would help with her discharge planning and affirmed her desire to be with her . Pt declines offer for prayer at this time. Pt's niece, Mary, supportive and calming presence to Rhonda.     Will continue to follow for emotional support and see pt per length of stay protocol.    FADIA Bueno.  Staff    Pager #[phone]     "

## 2019-02-20 NOTE — PROGRESS NOTES
[facility]  Hospitalist Progress Note  Ziyad Strickland MD 02/20/19    Reason for Stay (Diagnosis): Confusion, CVA         Assessment and Plan:      Summary of Stay:  Rhonda Mathur is a 79 year old female with multiple medical history such as chronic atrial fibrillation on oral anticoagulation, CAD, chronic heart failure with preserved ejection fraction, CKD stage II-III, hypertension, diabetes mellitus non-insulin-requiring, dyslipidemia, prior anemia, currently living independently at home and presented in the emergency room mostly due to increasing episodes of abdominal discomfort and pain leading to decreased oral intake at least for the past 1-2 days.  Patient was closely monitored for abdominal pain.  Her abdominal pain persisted and gastroenterology team was consulted to assist with further management.  Patient underwent endoscopy which revealed duodenitis and nonbleeding gastric ulcers with no stigmata of bleeding which was biopsied.  Patient was started on PPI and closely monitored.    Following endoscopy, patient became encephalopathic which is on and off which was initially attributed to benzodiazepine narcotic medication received for endoscopy.  Due to persistent encephalopathy, MRI was obtained which showed evidence of ischemic stroke which was felt to be embolic in nature and patient was started on Eliquis.  Echocardiogram with bubble study was negative but did demonstrate a small mobile filamentous echodensity noted on the aortic valve which could represent valvular calcification or a fibrinous strand versus a small papillary fibroelastoma.     On the morning of 2/18/19, patient was less responsive again.  CT of the head was negative for any hemorrhagic transformation.  Repeat MRI was unchanged.     Again had episode on 02/19 of becoming unresponsive lethargic. Stroke code was activated and CTA/CT head showed no acute occlusion/hemorrhagic conversion/ischemia. Transferred to ICU amd  patient's mental status slowly improved.  An EEG was obtained that showed some diffuse slowing consistent with mild encephalopathy.    Main issue is persistent confusion along with staring episodes that last for a couple of minutes.  Will ask general neurology to revisit today.     Problem List/Plan:     1. Acute CVA: felt to be thromboembolic in nature with history of chronic atrial fibrillation: Patient was started on Eliquis.  Negative head CT for hemorrhagic transformation and repeat MRI was unchanged.  Stroke team was consulted and they have recommended continuing Eliquis indefinitely and a baby aspirin for 7 days. Then Eliquis alone. Allow for some permissive hypertension with goal systolic blood pressure to be between 120-160.  2. Persistent acute encephalopathy: Patient is having recurrent episodes of confusion.  She also had a prolonged period of unresponsiveness lasting a few hours.  During this time an EEG was obtained that showed mild acute encephalopathy, but no sign of seizure.  Question if this was done during a postictal state or not.  She does continue to have episodes of staring into space lasting a minute or 2 and then she goes back to what she was doing.  I witnessed these episodes and she did not have any pauses on telemetry, hypotension, or hypoglycemia at that time.  Question absence seizure.  She does have symptoms consistent with delirium waxing and waning throughout the day.  For example she poor the last part of her coffee into her hashbrowns after looking at for a while as if she was confused to what it was.  She was also fidgety with her cell phone and seemed confused.  Interestingly she is oriented to the month and year along with location.  Only questions if there was some underlying dementia.  If so certainly could have delirium secondary to hospitalization and her stroke, however this degree of symptoms without another obvious etiology is a little bit surprising.  She does not have  any sign of infection or electrolyte abnormality. Multiple imaging tests including MRI and CTA/CT head have not shown any new acute findings to explain these symptoms.  Request general neurology consultation to reevaluate these episodes today per family request.  Continue delirium prevention cares.  3. Abdominal pain: Patient initially presented with this.  Status post endoscopy with findings of nonbleeding gastric ulcer and duodenitis. Continue Protonix 40 mg twice daily p.o.  4. Chronic diastolic CHF, HTN: Chronically on Bumex 2 mg a.m. and 1 mg afternoon, amlodipine 10 mg daily, lisinopril 5 mg daily, and Coreg 12.5 mg twice daily.  Allowing for some permissive hypertension given acute stroke.  Blood pressures actually been on the lower side despite holding her PTA regimen.  As of now have only restarted carvedilol at lower dose 3.125 mg twice daily.  5. Type 2 diabetes mellitus: Continue medium intensity sliding scale.  Metformin on hold for now.  6. Cardiac pauses: Otherwise asymptomatic.  Reducing Coreg to 3.125 mg p.o. twice daily for now.  7. CKD stage II-III: Creatinine 1-1 0.1.  Likely baseline and representative of some mild CKD.      DVT Prophylaxis: eliquis  Code Status: DNR / DNI  FEN: regular diet  Discharge Dispo: TCU vs ARU, PT/OT/SW following  Estimated Disch Date / # of Days until Disch: Transferred to medical floor with remote telemetry due to stable condition.  If confusion improves 1-2 days        Interval History (Subjective):      Assumed care today.  No acute events overnight.  Vitals have been stable.  She does continue to have brief episodes where she stares into space for a minute or 2 and then resumes which she is doing.  She is oriented to the month and date.  Exhibiting some odd behaviors such as while we were talking to poor the last bit of her coffee after hashbrowns and seemed to be confused by the cell phone in front of her.  She denies any pain, dizziness, vision changes,  "lightheadedness, shortness of breath, palpitations.                  Physical Exam:      Last Vital Signs:  /83   Pulse 58   Temp 97.8  F (36.6  C) (Oral)   Resp 28   Ht 1.676 m (5' 6\")   Wt 71.2 kg (157 lb)   SpO2 100%   BMI 25.34 kg/m        Intake/Output Summary (Last 24 hours) at 2/20/2019 1238  Last data filed at 2/20/2019 0800  Gross per 24 hour   Intake 480 ml   Output 1550 ml   Net -1070 ml       Constitutional: Awake, NAD  Eyes: sclera white   HEENT:  MMM  Respiratory: lungs cta bilaterally, no crackles or wheeze  Cardiovascular: Irregular, irregular rhythm.  Regular rate.  High-pitched 1/6 systolic murmur  GI: non-tender, not distended, bowel sounds present  Skin: no rash   Musculoskeletal/extremities: No edema  Neurologic: Alert.  Oriented to month and year along with location.  Could not remember the name of the president.  Had a few different episodes where she seemed to stare into space for 2 minutes and then resume when she was doing.  Did pour coffee into her hashbrowns and seemed to be confused by this and was also confused by her cell phone  Psychiatric: calm, cooperative          Medications:      All current medications were reviewed with changes reflected in problem list.         Data:      All new lab and imaging data was reviewed.   Labs:  Recent Labs   Lab 02/20/19  0531 02/19/19  0659 02/18/19  0718  02/17/19  0617    138  --   --  137   POTASSIUM 3.8 3.8 4.1   < > 3.2*   CHLORIDE 104 103  --   --  98   CO2 27 29  --   --  32   ANIONGAP 6 6  --   --  7   GLC 86 89  --   --  84   BUN 14 15  --   --  17   CR 1.06* 1.11*  --   --  1.23*   GFRESTIMATED 49* 47*  --   --  41*   GFRESTBLACK 57* 54*  --   --  48*   CONRADO 8.8 8.8  --   --  9.1    < > = values in this interval not displayed.     Recent Labs   Lab 02/20/19  0531 02/19/19  1112 02/19/19  0659   WBC 5.9 6.1 5.7   HGB 12.6 12.5 12.3   HCT 41.0 40.6 40.1   MCV 86 87 86    237 243     Recent Labs   Lab 02/20/19  1201 " "02/20/19  0754 02/20/19  0531 02/20/19  0357 02/19/19  2337 02/19/19  2309  02/19/19  0659  02/17/19  0617  02/16/19  0547  02/16/19  0233   GLC  --   --  86  --   --   --   --  89  --  84  --  100*  --  194*   BGM 95 105*  --  98 97 79   < >  --    < >  --    < >  --    < >  --     < > = values in this interval not displayed.      Imaging:   Recent Results (from the past 48 hour(s))   CT Head w/o Contrast    Narrative    CT SCAN OF THE HEAD WITHOUT CONTRAST   2/19/2019 10:41 AM     HISTORY: \"Code Stroke\".    TECHNIQUE:  Axial images of the head and coronal reformations without  IV contrast material. Radiation dose for this scan was reduced using  automated exposure control, adjustment of the mA and/or kV according  to patient size, or iterative reconstruction technique.    COMPARISON: Brain MR 2/18/2019. Head CT 2/18/2019.    FINDINGS: No evidence of acute intracranial hemorrhage. No mass effect  or midline shift. There is mild patchy periventricular white matter  hypodensities that are nonspecific but likely related to chronic  microvascular ischemic disease. Ventricular size is within normal  limits without evidence of hydrocephalus. Scattered areas of  subarachnoid calcifications in the right cerebral hemisphere are  unchanged.    The visualized portions of the sinuses and mastoids appear normal. The  bony calvarium and bones of the skull base appear intact.       Impression    IMPRESSION:     1. No evidence of acute intracranial hemorrhage, mass, or herniation.  2. No significant change of multiple calcifications throughout the  right cerebral hemisphere representing age-indeterminate calcified  emboli in the right middle cerebral artery territory.    SHELLY HUFFMAN MD   CTA Head Neck with Contrast    Narrative    CT ANGIOGRAM OF THE HEAD AND NECK WITH CONTRAST  CT HEAD PERFUSION WITH CONTRAST  2/19/2019 10:46 AM     HISTORY: \"Code Stroke\"     TECHNIQUE:  CT angiography with an injection of 70mL " Isovue-370  (accession [id number]), 50mL Isovue-370 (accession [id number]) IV with  scans through the head and neck. Images were transferred to a separate  3-D workstation where multiplanar reformations and 3-D images were  created. Estimates of carotid stenoses are made relative to the distal  internal carotid artery diameters except as noted. Radiation dose for  this scan was reduced using automated exposure control, adjustment of  the mA and/or kV according to patient size, or iterative  reconstruction technique.     Perfusion scans were performed at three levels with injection of  additional IV nonionic contrast and saline flush.  These images were  processed on a separate 3-D workstation.     COMPARISON: CTA head and neck 2/16/2019.     CT HEAD FINDINGS: No contrast enhancing lesions. CT perfusion images  demonstrates subtle increase in time to drain as well as T-max in the  right MCA territory. No obvious decreased cerebral blood volume or  cerebral blood flow.     CT ANGIOGRAM HEAD FINDINGS: No definite vascular cutoff of the  proximal major intracranial arteries. Again seen are two separate  dense areas of calcification of the M3 branches of the right MCA in  the sylvian fissure. The M3 branches distal to these calcifications  are again patent but may be slightly small in size. No other definite  vascular cutoff is appreciated, although the more distal right M3 and  M4 branches are at or below the spatial resolution of CTA.    There is calcified atherosclerotic disease of the cavernous and  supraclinoid carotid arteries resulting in moderate stenosis on the  right and mild stenosis on the left. No aneurysm is appreciated.    CT ANGIOGRAM NECK FINDINGS:   Marked calcified atherosclerotic disease at the aortic arch and  origins of the great vessels. There is mild-to-moderate stenosis of  the origins of the great vessels from the aortic arch.    Right carotid artery: The right common and internal carotid  arteries  are patent. Sequela of previous right carotid endarterectomy with  multiple surgical clips around the right carotid artery. Scattered  soft and calcified plaque in the right common carotid artery and  proximal internal carotid artery. No significant stenosis. The right  carotid bifurcation is widely patent.     Left carotid artery: The left common and internal carotid arteries are  patent. Scattered calcification throughout the carotid artery and the  common carotid artery, carotid bifurcation, and proximal internal  carotid artery. This results in approximately 59% stenosis by NASCET  criteria.     Vertebral arteries: The left vertebral artery is patent throughout its  course without significant stenosis. The right vertebral artery is  patent at its origin. Right vertebral artery is smaller than the left.  There is gradual decrease of opacification of the distal V2 and V3  segments of the vertebral artery, although the opacification is  improved compared to prior CTA. This could represent an  age-indeterminate dissection.    Other findings: Heterogeneous 1.7 cm right thyroid gland nodule.       Impression    IMPRESSION:   1. No definite vascular cutoff of the proximal major intracranial  arteries. Dense calcifications within the right M3 branches are again  seen likely due to age-indeterminate calcified emboli or calcified  atherosclerotic disease.  2. CT perfusion images demonstrate subtle abnormality in the right MCA  territory which could represent ischemia. The previously visualized  cortical infarcts on brain MRI are not appreciated.  3. Again seen is decreased opacification of the distal right vertebral  artery in its V2 and V3 segments, possibly due to dissection although  the opacification is improved since prior CTA. The improvement in  opacification could be due to differences in timing of the CTA versus  improved flow in the vertebral artery.  4. Marked calcified atherosclerotic disease at the  "aortic arch causing  mild-to-moderate stenosis at the origins of the great vessels.  5. Atherosclerotic disease in the carotid arteries bilaterally. There  is approximately 59% stenosis of the left carotid artery by NASCET  criteria. Sequela of previous right carotid endarterectomy without  significant residual stenosis.  6. Heterogeneous 1.7 cm right thyroid gland nodule. Recommend further  evaluation with thyroid ultrasound on a nonemergent basis.    Results discussed with nurse Jessica Dillon at 11:07 AM on 2/19/2019.     SHELLY HUFFMAN MD   CT Head Perfusion w Contrast    Narrative    CT ANGIOGRAM OF THE HEAD AND NECK WITH CONTRAST  CT HEAD PERFUSION WITH CONTRAST  2/19/2019 10:46 AM     HISTORY: \"Code Stroke\"     TECHNIQUE:  CT angiography with an injection of 70mL Isovue-370  (accession [id number]), 50mL Isovue-370 (accession [id number]) IV with  scans through the head and neck. Images were transferred to a separate  3-D workstation where multiplanar reformations and 3-D images were  created. Estimates of carotid stenoses are made relative to the distal  internal carotid artery diameters except as noted. Radiation dose for  this scan was reduced using automated exposure control, adjustment of  the mA and/or kV according to patient size, or iterative  reconstruction technique.     Perfusion scans were performed at three levels with injection of  additional IV nonionic contrast and saline flush.  These images were  processed on a separate 3-D workstation.     COMPARISON: CTA head and neck 2/16/2019.     CT HEAD FINDINGS: No contrast enhancing lesions. CT perfusion images  demonstrates subtle increase in time to drain as well as T-max in the  right MCA territory. No obvious decreased cerebral blood volume or  cerebral blood flow.     CT ANGIOGRAM HEAD FINDINGS: No definite vascular cutoff of the  proximal major intracranial arteries. Again seen are two separate  dense areas of calcification of the M3 branches of the " right MCA in  the sylvian fissure. The M3 branches distal to these calcifications  are again patent but may be slightly small in size. No other definite  vascular cutoff is appreciated, although the more distal right M3 and  M4 branches are at or below the spatial resolution of CTA.    There is calcified atherosclerotic disease of the cavernous and  supraclinoid carotid arteries resulting in moderate stenosis on the  right and mild stenosis on the left. No aneurysm is appreciated.    CT ANGIOGRAM NECK FINDINGS:   Marked calcified atherosclerotic disease at the aortic arch and  origins of the great vessels. There is mild-to-moderate stenosis of  the origins of the great vessels from the aortic arch.    Right carotid artery: The right common and internal carotid arteries  are patent. Sequela of previous right carotid endarterectomy with  multiple surgical clips around the right carotid artery. Scattered  soft and calcified plaque in the right common carotid artery and  proximal internal carotid artery. No significant stenosis. The right  carotid bifurcation is widely patent.     Left carotid artery: The left common and internal carotid arteries are  patent. Scattered calcification throughout the carotid artery and the  common carotid artery, carotid bifurcation, and proximal internal  carotid artery. This results in approximately 59% stenosis by NASCET  criteria.     Vertebral arteries: The left vertebral artery is patent throughout its  course without significant stenosis. The right vertebral artery is  patent at its origin. Right vertebral artery is smaller than the left.  There is gradual decrease of opacification of the distal V2 and V3  segments of the vertebral artery, although the opacification is  improved compared to prior CTA. This could represent an  age-indeterminate dissection.    Other findings: Heterogeneous 1.7 cm right thyroid gland nodule.       Impression    IMPRESSION:   1. No definite vascular  cutoff of the proximal major intracranial  arteries. Dense calcifications within the right M3 branches are again  seen likely due to age-indeterminate calcified emboli or calcified  atherosclerotic disease.  2. CT perfusion images demonstrate subtle abnormality in the right MCA  territory which could represent ischemia. The previously visualized  cortical infarcts on brain MRI are not appreciated.  3. Again seen is decreased opacification of the distal right vertebral  artery in its V2 and V3 segments, possibly due to dissection although  the opacification is improved since prior CTA. The improvement in  opacification could be due to differences in timing of the CTA versus  improved flow in the vertebral artery.  4. Marked calcified atherosclerotic disease at the aortic arch causing  mild-to-moderate stenosis at the origins of the great vessels.  5. Atherosclerotic disease in the carotid arteries bilaterally. There  is approximately 59% stenosis of the left carotid artery by NASCET  criteria. Sequela of previous right carotid endarterectomy without  significant residual stenosis.  6. Heterogeneous 1.7 cm right thyroid gland nodule. Recommend further  evaluation with thyroid ultrasound on a nonemergent basis.    Results discussed with nurse Jessica Dillon at 11:07 AM on 2/19/2019.     MD Ziyad CAPUTO MD

## 2019-02-20 NOTE — PLAN OF CARE
ICU End of Shift Summary.  For vital signs and complete assessments, please see documentation flowsheets.     Pertinent assessments: A&O. Able to express needs. Afebrile. Low back pain controlled with tylenol, orthopedic EO, and massage.  Up with SBA 2 walker and GB to BSC. Tolerating Reg diet but c/o poor appetite. Denies Nausea. DTV &  ml urine.  Voided 250 ml clear yellow urine. +BS all 4 quad. Last BM 2/18.  BMG 79-98.  Major Shift Events: HR 42-60's. Asymmtomatic. MD aware. Plan to continue to monitor.   Plan (Upcoming Events): Continue ICU cares. Need stool sample. PT/OT & swallow eval today. Discharge/Transfer Needs: discontinue to TCU. Resides at [facility].    Bedside Shift Report Completed : yes  Bedside Safety Check Completed: yes

## 2019-02-20 NOTE — CONSULTS
Neurology Consult Note  [facility]       [2019]                                                                                       Admission Date: 2019  Hospital Day: 10      Patient: Rohnda Mathur      : 1940  MRN:  [id number]     CC:      Chief Complaint   Patient presents with     Abdominal Pain       Consult Request:  Referring Provider:  Bret Myers MD  Primary Care Provider:  Diego Taveras MD        HPI:  Rhonda Mathur is a 79 year old yo female who developed acute confusion after EGD on 19. MRi of her brain on 19 showed multiple acute infarcts in the right MCA territory. She was evaluated by Stroke service and was started on Eliquis and Aspirin. She had episodes of unresponsiveness on  and . Repeat head CT did not show any new stroke or hemorrhagic transformation.Her EEG was negative for active epileptiform activity. There was mild encephalopathy. Her family thinks that she has episodes of increased confusion that can last many hours. There is no focal motor activity during these spells.  She had mild memory deficits at her baseline. She lives in an independent living facility.          A complete review of symptoms was performed including vascular, infectious, cardiovascular, pulmonary, gastrointestinal, endocrinological, hematologic, dermatologic, musculoskeletal, and neurological. All were normal except as above.    PAST MEDICAL HISTORY:  ALLERGIES: No Known Allergies  Tobacco:    History   Smoking Status     Not on file   Smokeless Tobacco     Not on file     Alcohol:  Social History    Substance and Sexual Activity      Alcohol use: Not on file    MEDICATIONS:       CURRENTLY SCHEDULED MEDICATIONS     apixaban ANTICOAGULANT  5 mg Oral BID     aspirin  81 mg Oral Daily     atorvastatin  20 mg Oral QPM     carvedilol  3.125 mg Oral BID w/meals     insulin aspart  1-6 Units Subcutaneous Q4H     oxybutynin ER  15 mg  Oral Daily     pantoprazole  40 mg Oral BID          HOME MEDICATIONS  Medications Prior to Admission   Medication Sig Dispense Refill Last Dose     acetaminophen (TYLENOL) 325 MG tablet Take 325-650 mg by mouth every 4 hours as needed for mild pain        albuterol (PROAIR HFA/PROVENTIL HFA/VENTOLIN HFA) 108 (90 Base) MCG/ACT inhaler Inhale 2-4 puffs into the lungs every 4 hours as needed for shortness of breath / dyspnea or wheezing        amLODIPine (NORVASC) 10 MG tablet Take 10 mg by mouth daily   2/11/2019 at Unknown time     apixaban ANTICOAGULANT (ELIQUIS) 5 MG tablet Take 5 mg by mouth 2 times daily   2/11/2019 at x1     aspirin 81 MG EC tablet Take 81 mg by mouth daily   2/11/2019 at Unknown time     atorvastatin (LIPITOR) 20 MG tablet Take 20 mg by mouth daily   2/11/2019 at Unknown time     bumetanide (BUMEX) 1 MG tablet Take 2 mg by mouth every morning   2/11/2019 at Unknown time     bumetanide (BUMEX) 1 MG tablet Take 1 mg by mouth In afternoon   2/10/2019 at Unknown time     carvedilol (COREG) 12.5 MG tablet Take 12.5 mg by mouth 2 times daily (with meals)   2/11/2019 at x1     gabapentin (NEURONTIN) 600 MG tablet Take 600 mg by mouth 2 times daily   2/11/2019 at x1     lisinopril (PRINIVIL/ZESTRIL) 5 MG tablet Take 5 mg by mouth daily   2/11/2019 at Unknown time     metFORMIN (GLUCOPHAGE) 500 MG tablet Take 500 mg by mouth 2 times daily (with meals)   2/11/2019 at x1     nitroGLYcerin (NITROSTAT) 0.4 MG sublingual tablet Place 0.4 mg under the tongue every 5 minutes as needed for chest pain For chest pain place 1 tablet under the tongue every 5 minutes for 3 doses. If symptoms persist 5 minutes after 1st dose call 911.        omeprazole (PRILOSEC) 20 MG DR capsule Take 20 mg by mouth 2 times daily   2/11/2019 at x1     oxybutynin ER (DITROPAN XL) 15 MG 24 hr tablet Take 15 mg by mouth daily   2/11/2019 at Unknown time     oxyCODONE-acetaminophen (PERCOCET) 5-325 MG tablet Take 1 tablet by mouth daily  "as needed for severe pain        potassium chloride ER (K-DUR/KLOR-CON M) 20 MEQ CR tablet Take 20 mEq by mouth daily   2/11/2019 at Unknown time     vitamin B-12 (CYANOCOBALAMIN) 1000 MCG tablet Take 1,000 mcg by mouth daily   2/11/2019 at Unknown time     MEDICAL HISTORY  History reviewed. No pertinent past medical history.  SURGICAL HISTORY  Past Surgical History:   Procedure Laterality Date     ESOPHAGOSCOPY, GASTROSCOPY, DUODENOSCOPY (EGD), COMBINED N/A 2/14/2019    Procedure: ESOPHAGOSCOPY, GASTROSCOPY, DUODENOSCOPY;  Surgeon: Alfredo Hawkins MD;  Location:  OR     FAMILY HISTORY    History reviewed. No pertinent family history.  SOCIAL HISTORY  Social History     Socioeconomic History     Marital status:      Spouse name: None     Number of children: None     Years of education: None     Highest education level: None   Occupational History     None   Social Needs     Financial resource strain: None     Food insecurity:     Worry: None     Inability: None     Transportation needs:     Medical: None     Non-medical: None   Tobacco Use     Smoking status: None   Substance and Sexual Activity     Alcohol use: None     Drug use: None     Sexual activity: None   Lifestyle     Physical activity:     Days per week: None     Minutes per session: None     Stress: None   Relationships     Social connections:     Talks on phone: None     Gets together: None     Attends Hinduism service: None     Active member of club or organization: None     Attends meetings of clubs or organizations: None     Relationship status: None     Intimate partner violence:     Fear of current or ex partner: None     Emotionally abused: None     Physically abused: None     Forced sexual activity: None   Other Topics Concern     None   Social History Narrative     None            Height: 167.6 cm (5' 6\")     Temp: 97.9  F (36.6  C)   Weight: 71.2 kg (157 lb)(with seizure pads)    Temp src: Oral         BP: (!) 111/93   Heart Rate: 61 " "    Estimated body mass index is 25.34 kg/m  as calculated from the following:    Height as of this encounter: 1.676 m (5' 6\").    Weight as of this encounter: 71.2 kg (157 lb).    Resp: 21   SpO2: 98 %   O2 Device: None (Room air)     Blood Pressure:   BP Readings from Last 3 Encounters:   19 (!) 111/93     T24 : Temp (24hrs), Av.9  F (36.6  C), Min:97.7  F (36.5  C), Max:98.4  F (36.9  C)       GENERAL EXAMINATION:  HEENT: PERRLA, EOMI.  Neck: Supple.       NEUROLOGICAL EXAMINATION  Mental Status:  Patient is awake, alert, but disoriented to place and date. MOCA score- 9/30 indicative of severe cognitive deficits.    Cranial Nerves: Pupils are equal and reactive to light. Extraocular movements are intact. There is no nystagmus in the horizontal or vertical planes.No facial sensory deficits. No facial weakness. The tongue is midline.     Motor: Muscle tone is normal. There is no pronator drift. There is no muscle atrophy. The strength is 5/5 in upper and lower extremities bilaterally. Deep tendon reflexes are 2+ and symmetric in his biceps, triceps, knees, and ankles. Plantars are flexor. .       Coordination: Finger to nose -normal.    Gait: not evaluated.    .  LABORATORY RESULTS      Recent Labs   Lab 19  0531 19  1112 19  0659   WBC 5.9 6.1 5.7   HGB 12.6 12.5 12.3   HCT 41.0 40.6 40.1   MCV 86 87 86    237 243     Recent Labs   Lab 19  0531 19  0659 19  0718  19  0617 19  1521  19  0735    138  --   --  137  --    < > 137   POTASSIUM 3.8 3.8 4.1   < > 3.2*  --    < > 3.6   CHLORIDE 104 103  --   --  98  --    < > 99   CO2 27 29  --   --  32  --    < > 33*   ANIONGAP 6 6  --   --  7  --    < > 5   GLC 86 89  --   --  84  --    < > 90   BUN 14 15  --   --  17  --    < > 16   CR 1.06* 1.11*  --   --  1.23*  --    < > 1.15*   GFRESTIMATED 49* 47*  --   --  41*  --    < > 45*   GFRESTBLACK 57* 54*  --   --  48*  --    < > 52*   CONRADO 8.8 8.8 "  --   --  9.1  --    < > 8.7   MAG  --   --   --   --   --   --   --  1.6   PROTTOTAL  --   --   --   --   --  6.8  --   --    ALBUMIN  --   --   --   --   --  3.4  --   --    BILITOTAL  --   --   --   --   --  0.5  --   --    ALKPHOS  --   --   --   --   --  72  --   --    AST  --   --   --   --   --  17  --   --    ALT  --   --   --   --   --  13  --   --     < > = values in this interval not displayed.     Recent Labs   Lab 02/20/19  0531   SED 7   CRP <2.9       IMAGING RESULTS     No results found for this or any previous visit (from the past 24 hour(s)).    _____________________________________________________________________________  _____________________________________________________________________________          ASSESSMENT     1. Increased Confusion- Vascular dementia with probable Alzheimer's at baseline. discharge Oxybutynin which may worsen her cognition. She had no epileptiform activity on EEG, lessening the possibility of seizures.        RECOMMENDATIONS   1. Start patient on Aricept 5 mg after dinner and Namenda 5 mg qam.  2. Watchful observation for unexplained episodes of unresponsiveness.  3. TCU and f/u as outpatient in 1 month

## 2019-02-20 NOTE — PROCEDURES
Procedure Date: 2019      ELECTROENCEPHALOGRAM       EEG #       DATE OF RECORDIN2019      DURATION OF RECORDIN hour and 1 minute      CLINICAL HISTORY:  This patient is an 79-year-old female with history of multiple medical problems, who presented with altered mental status.  An EEG was requested for evaluation for seizures versus encephalopathy.      TECHNICAL SUMMARY: This continuous video- EEG monitoring procedure was performed with 23 scalp electrodes in 10-20 electrode system placements, and additional scalp, precordial and other surface electrodes used for electrical referencing and artifact detection.  Video monitoring was utilized and periodically reviewed by EEG technologists and the physician for electroclinical correlations.     RESULTS:   BACKGROUND ACTIVITIES:  The background activities of this EEG were symmetric and consisted of a poorly formed 9 Hz posterior dominant rhythm, which attenuates with eye opening.  There are intermittent brief bursts of mixed theta and delta activities during wakefulness.  Hyperventilation and photic stimulation were not performed.      No sleep stages were recorded.      No interictal epileptiform activities were observed.      ICTAL ACTIVITIES:  None.      IMPRESSION:  This EEG is mildly abnormal due to the presence of intermittent brief bursts of theta and delta slowing.  These findings are suggestive of mild diffuse encephalopathy of which the etiology is nonspecific.  No seizures were recorded.         ORION VILLANUEVA MD             D: 2019   T: 2019   MT: MIGUEL ÁNGEL      Name:     MARGO CANDELARIO   MRN:      [id number]        Account:        [id number]   :      1940           Procedure Date: 2019      Document: [id number]

## 2019-02-20 NOTE — PROGRESS NOTES
Pt transferred to 330 from 362 via WC escorted by RN. Report given to Jessica, no further questions. All belongings sent with pt at time of transfer. Oriented to room and unit. See flowsheet for additional assessment information.

## 2019-02-20 NOTE — PLAN OF CARE
"Discharge Planner OT   Patient plan for discharge: ARU  Current status: Pt now transferred to ICU due to unresponsive episode, goals and POC reviewed and remain appropriate. Pt completed bed mobility supine > sit EOB with min A. Pt completed sit > stand from EOB to FWW with CGA, required min A for steadying support for transition to BSC - pt noted to be crossing her feet and attempting to \"step over\" object on floor despite clear path. Increased difficulty with mobility. Pt required mod A for toileting tasks including posterior hygiene and threading/mgmt of brief in stance. Pt ambulated short distance to bedside chair, again requiring min A for safe mgmt of FWW and balance support. /68 post activity. Pt A&Ox3, however noted delayed responses as compared to previous sessions.   Barriers to return to prior living situation: Current level of assist for ADLs/transfers, decreased strength/functional activity tolerance  Recommendations for discharge: ARU  Rationale for recommendations: Pt would benefit from ongoing skilled OT to maximize safety and indep in all ADLs/IADLs and mobility tasks. Pt is motivated to return to indep PLOF, has strong family support. Pt reports facility could increase services if needed.         Entered by: Blanche Watts 02/20/2019 12:39 PM       "

## 2019-02-20 NOTE — PROGRESS NOTES
Full note to follow.    Patient seen by me.  Discussed care with niece and daughter.  Overall looks stable.  Still confused this morning.  Also having staring episodes in front of me last a minute or two then turns and answers questions.  Checked BP there was not hypotension.  Monitored on tele, there is no arrhythmia or pause at this time.  Had EEG that showed mild slowing, but this as when she was more obtunded and I wonder if this was post ictal state?  Possible absence seizures?  Has been evaluate for other causes of encephalopathy such as infection or electrolyte abnormality and this has not been found.  Perhaps as underlying dementia and now delirium following her stroke and hospitalization.  Currently far from baseline per family.    -will request general neurology consultation to revisit given ongoing confusion and staring episodes.  Has already had repeat perfusion scans and is not having image findings of new stroke  -transfer to medical floor, continue remote telemetry  -PT/OT consulted

## 2019-02-21 ENCOUNTER — APPOINTMENT (OUTPATIENT)
Dept: OCCUPATIONAL THERAPY | Facility: CLINIC | Age: 79
DRG: 383 | End: 2019-02-21
Payer: MEDICARE

## 2019-02-21 ENCOUNTER — APPOINTMENT (OUTPATIENT)
Dept: PHYSICAL THERAPY | Facility: CLINIC | Age: 79
DRG: 383 | End: 2019-02-21
Payer: MEDICARE

## 2019-02-21 LAB
GLUCOSE BLDC GLUCOMTR-MCNC: 107 MG/DL (ref 70–99)
GLUCOSE BLDC GLUCOMTR-MCNC: 112 MG/DL (ref 70–99)

## 2019-02-21 PROCEDURE — A9270 NON-COVERED ITEM OR SERVICE: HCPCS | Mod: GY | Performed by: INTERNAL MEDICINE

## 2019-02-21 PROCEDURE — 25000132 ZZH RX MED GY IP 250 OP 250 PS 637: Mod: GY | Performed by: INTERNAL MEDICINE

## 2019-02-21 PROCEDURE — 97116 GAIT TRAINING THERAPY: CPT | Mod: GP | Performed by: PHYSICAL THERAPIST

## 2019-02-21 PROCEDURE — 99233 SBSQ HOSP IP/OBS HIGH 50: CPT | Performed by: INTERNAL MEDICINE

## 2019-02-21 PROCEDURE — 00000146 ZZHCL STATISTIC GLUCOSE BY METER IP

## 2019-02-21 PROCEDURE — 25000132 ZZH RX MED GY IP 250 OP 250 PS 637: Mod: GY | Performed by: PSYCHIATRY & NEUROLOGY

## 2019-02-21 PROCEDURE — 97530 THERAPEUTIC ACTIVITIES: CPT | Mod: GP | Performed by: PHYSICAL THERAPIST

## 2019-02-21 PROCEDURE — 12000000 ZZH R&B MED SURG/OB

## 2019-02-21 PROCEDURE — 97530 THERAPEUTIC ACTIVITIES: CPT | Mod: GO

## 2019-02-21 PROCEDURE — A9270 NON-COVERED ITEM OR SERVICE: HCPCS | Mod: GY | Performed by: PHYSICIAN ASSISTANT

## 2019-02-21 PROCEDURE — A9270 NON-COVERED ITEM OR SERVICE: HCPCS | Mod: GY | Performed by: PSYCHIATRY & NEUROLOGY

## 2019-02-21 PROCEDURE — 25000132 ZZH RX MED GY IP 250 OP 250 PS 637: Mod: GY | Performed by: PHYSICIAN ASSISTANT

## 2019-02-21 RX ORDER — QUETIAPINE FUMARATE 25 MG/1
25 TABLET, FILM COATED ORAL
Status: DISCONTINUED | OUTPATIENT
Start: 2019-02-21 | End: 2019-02-26 | Stop reason: HOSPADM

## 2019-02-21 RX ORDER — QUETIAPINE FUMARATE 25 MG/1
25 TABLET, FILM COATED ORAL 2 TIMES DAILY
Status: DISCONTINUED | OUTPATIENT
Start: 2019-02-21 | End: 2019-02-21

## 2019-02-21 RX ORDER — AMLODIPINE BESYLATE 2.5 MG/1
2.5 TABLET ORAL DAILY
Status: DISCONTINUED | OUTPATIENT
Start: 2019-02-21 | End: 2019-02-26 | Stop reason: HOSPADM

## 2019-02-21 RX ORDER — ATORVASTATIN CALCIUM 40 MG/1
40 TABLET, FILM COATED ORAL EVERY EVENING
Status: DISCONTINUED | OUTPATIENT
Start: 2019-02-21 | End: 2019-02-26 | Stop reason: HOSPADM

## 2019-02-21 RX ADMIN — APIXABAN 5 MG: 5 TABLET, FILM COATED ORAL at 10:49

## 2019-02-21 RX ADMIN — APIXABAN 5 MG: 5 TABLET, FILM COATED ORAL at 20:45

## 2019-02-21 RX ADMIN — ATORVASTATIN CALCIUM 40 MG: 40 TABLET, FILM COATED ORAL at 20:45

## 2019-02-21 RX ADMIN — MEMANTINE 5 MG: 5 TABLET ORAL at 10:49

## 2019-02-21 RX ADMIN — CARVEDILOL 3.12 MG: 3.12 TABLET, FILM COATED ORAL at 10:49

## 2019-02-21 RX ADMIN — PANTOPRAZOLE SODIUM 40 MG: 40 TABLET, DELAYED RELEASE ORAL at 10:49

## 2019-02-21 RX ADMIN — PANTOPRAZOLE SODIUM 40 MG: 40 TABLET, DELAYED RELEASE ORAL at 20:45

## 2019-02-21 RX ADMIN — DONEPEZIL HYDROCHLORIDE 5 MG: 5 TABLET ORAL at 21:40

## 2019-02-21 RX ADMIN — AMLODIPINE BESYLATE 2.5 MG: 2.5 TABLET ORAL at 16:58

## 2019-02-21 RX ADMIN — ASPIRIN 81 MG 81 MG: 81 TABLET ORAL at 10:49

## 2019-02-21 RX ADMIN — CARVEDILOL 3.12 MG: 3.12 TABLET, FILM COATED ORAL at 18:40

## 2019-02-21 ASSESSMENT — ACTIVITIES OF DAILY LIVING (ADL)
ADLS_ACUITY_SCORE: 15
ADLS_ACUITY_SCORE: 17
ADLS_ACUITY_SCORE: 16
ADLS_ACUITY_SCORE: 16

## 2019-02-21 ASSESSMENT — MIFFLIN-ST. JEOR: SCORE: 1170.31

## 2019-02-21 NOTE — PLAN OF CARE
"Discharge Planner OT   Patient plan for discharge: ARU  Current status: Pt seated in chair, slow to respond. Pt required min A for sit > stand from chair to FWW, mod A to achieve upright standing balance due to posterior lean, weight shifted back heels. Pt attempted ambulation to BR FWW level, required mod A for balance support - pt noted to be taking very large steps, scissoring her feet at times, impaired coordination noted. Pt walked ~3 feet and then required seated rest break due to significant unsteady balance. Pt was able to return to bedside chair FWW level with min Ax2, improved stepping noted. Pt with very flat affect, delayed processed and slowed responses this date. Pt emotional and tearful stating \"I want to go home,\" support provided, niece present and also supportive.   Barriers to return to prior living situation: Current level of assist for ADLs/transfers, decreased strength/functional activity tolerance  Recommendations for discharge: ARU  Rationale for recommendations: Pt would benefit from ongoing skilled OT to maximize safety and indep in all ADLs/IADLs and mobility tasks. Pt is motivated to return to indep PLOF, has strong family support. Pt reports facility could increase services if needed.         Entered by: Blanche Watts 02/21/2019 11:23 AM       "

## 2019-02-21 NOTE — PROGRESS NOTES
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    Prior to this admission she was living in independent living in the same care center that her  was living in a memory care unit.  Over the past couple of years she has also had some declining memory particularly with short-term memory but has otherwise been able to manage her affairs reasonably well although needed help with complex tasks such as managing finances.  Furthermore she was ambulatory with a walker but had been off balance for the past 1-2 years but that had also declined over the past couple of months.  She was still ambulating but often stop in the middle of her path to reorient herself.  She does have known peripheral neuropathy and complained of a sense of imbalance like she was going to fall frequently.    In any case her primary plant on presentation was abdominal pain was ultimately discovered that she had 2 nonbleeding gastric ulcers.  Her abdominal pain has subsequently resolved with appropriate antacid therapy.    After after her endoscopy she had altered mental status and ultimately getting an MRI that showed right hemispheric embolic strokes.  It should be noted that she was off her apixaban for the 3 and half days prior to this procedure.    She has had an RRT 2 days later on 2/18/2018 that look like she was having episodes of  unresponsiveness concerning for further strokes.  Repeat MRI actually showed some improvement in the prior noted right hemispheric strokes.    Since her endoscopy she has had evidence of delirium in the setting of suspected progressive mild to moderate dementia at home.  She has had episodes of staring off into space that would last minutes prompting an EEG which was consistent with encephalopathy but did not reveal any epileptiform activity.  It is not clear that the EEG was completed during a time of 1 of these episodes.    She remains pleasant, but disoriented, and ambulatory status has been significantly impaired    Problem List:   1. Abdominal pain: Primary reason for presenting to our emergency room.  Looks most consistent with chronic gastric ulcers and substantially improved on twice daily PPI  2. Acute stroke: Looks embolic through her right hemisphere.  Her neuro exam to me is diffuse weakness and if anything she seems more weak on the right.  Not sure that the lack of her apixaban during the early part of her hospitalization in combination with her procedure led to her stroke although that certainly could have I feel like more likely scenario is that this is probably due to plaque rupture.  Nonetheless appreciate telemetry stroke input-she should have overlap of apixaban and baby aspirin for 7 days and then just apixaban alone.  Fasting lipid panel here shows a total cholesterol of 151, HDL of 35 and LDL of 78 -this is on her prior to admission atorvastatin at 20 mg a day.  That has been continued at this time.  I think would be reasonable to increase this to 40 based on my concern for plaque rupture and goal LDL would be less than 70.  She should have repeat LFT and FLP testing in 4-6 weeks all check some baseline LFTs in the morning  3. Episodes where she is disengaged for the moment,, it is unclear if this is just a component of her delirium or this represents  some type of seizure activity.  Really  not have any other explanation I have asked that the niece and daughter keep track of it if it seems to happen more than once a day I think we should just set her up for 24-hour EEG  4. 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  She has had some short-term memory loss over the past couple of years that seems to have worsened over the past several months.  She is repetitive in her questions and cannot remember certain things like dates.  Is also lost the ability to balance her checkbook and so they have really stepped in to manage that for her.  She still is living independently and has been functional in regards to this although likely require additional services at discharge.  Per neuro they initiated donepezil at 5 mg a day as well as memantine at 5 mg a day.  I doubt that we will have much impact during this hospitalization  5. Delirium: In the setting of mild to moderate dementia not formally diagnosed prior to this admission.  She has a bit of a shuffling gait although I have no idea right now that is due to weakness and/or she has undiagnosed parkinsonism.  Might be reasonable to discuss this further with family members as perhaps a Sinemet might be an appropriate thing to try I have recommended initiation of quetiapine at bedtime on a as needed basis to ensure that we continue normal day night cycle.  She is not agitated enough to really require anything else at this time.  Her prior to admission gabapentin has subsequently been discontinued in the setting of this delirium.  6. Hypertension: Prior to admission medications are amlodipine 10 mg a day, bumetanide 2 mg every morning, lisinopril 5 mg p.o. Daily, and carvedilol 3.125 mg p.o. twice daily.  She is currently only on her carvedilol due to some hypotension and allowance of permissive hypertension in the setting of an acute stroke.  Her blood pressures are really quite erratic although now  "are trending upwards in a more consistent fashion.  Were clearly far enough out from the stroke that we can start adding back in some blood pressure medications.  She does not appear volume up at this time somewhat a continue to hold her bumetanide.  Her heart rates are in the 50s and 60s set on a titrate up on her carvedilol.  And so will resume amlodipine at 2.5 mg a day and that can be continued to be titrated up to her typical dosing of 10 mg as her BP tolerates  7. Atrial fibrillation: On chronic anticoagulation with apixaban on low-dose beta-blocker for rate control  8. ACKD stage II-III-typical creatinines are in the 1-1.2 range-stable throughout her hospitalization  9. Type 2 diabetes: Typically only on metformin.  P.o. intake is poor blood sugars have essentially been normal and hemoglobin A1c on presentation was 6.3%.  I have discontinued all insulin sliding scale and blood sugar checks at this time  DVT Prophylaxis: Apixaban  Code Status: DNR / DNI    Disposition Plan   Expected discharge in tbd days to ARU once above sorted out, but most likely scenario is ARU on Monday .     Entered: Cleo MARLENJavier Carlson 02/21/2019, 3:28 PM       This is discussed in depth with her niece Mary who is in the room and then her daughter Fawn who is on speaker phone.        Interval History (Subjective):      She is actually been pretty stable over her evening and daytime today.  Her movements financially below baseline but she with significant encouragement she will shuffle her way across the room.  She stops frequently to reorient herself.  She is not having any respiratory distress or any pain that she tells me.  She remains completely confused about where she is but she appears quite calm                  Physical Exam:      Last Vital Signs:  /86 (BP Location: Left arm)   Pulse 68   Temp 98.4  F (36.9  C) (Oral)   Resp 16   Ht 1.676 m (5' 6\")   Wt 68.9 kg (151 lb 12.8 oz)   SpO2 96%   BMI 24.50 kg/m      I/O: " Net -5 L  Tele: A. fib with controlled ventricular response, and significant pauses all less than 2.5     She is pleasant and she is not in any type of distress.  When I walk into the room she is standby assist of 2 with a walker and a safety belt and shuffles across the room frequently will stop to regain her balance and will hold her right foot up but she is able to bear weight on each foot.  Head is normocephalic atraumatic and sclera are clear.  Lungs are clear to auscultation she exhibits normal respiratory effort heart is irregularly irregular no murmurs rubs or gallops no lower extremity edema abdominal exam is soft nontender nondistended skin is warm and dry she is able to lift both feet off the floor about 30 degrees while sitting in a chair and her strength in her bilateral upper extremities is 4 out of 5 as she can lift it against gravity but she has a very weak grasp.  Therefore her symptoms of weakness seem more diffuse         Medications:      All current medications were reviewed with changes reflected in problem list.         Data:      All new lab and imaging data was reviewed.   Labs:  Recent Labs   Lab 02/20/19  0531      POTASSIUM 3.8   CHLORIDE 104   CO2 27   ANIONGAP 6   GLC 86   BUN 14   CR 1.06*   GFRESTIMATED 49*   GFRESTBLACK 57*   CONRADO 8.8     Recent Labs   Lab 02/20/19  0531   WBC 5.9   HGB 12.6   HCT 41.0   MCV 86         Imaging:

## 2019-02-21 NOTE — PLAN OF CARE
"  [place]: Dis to place, time and sit.   VS: /67   Pulse 68   Temp 98  F (36.7  C) (Oral)   Resp 16   Ht 1.676 m (5' 6\")   Wt 68.9 kg (151 lb 12.8 oz)   SpO2 99%   BMI 24.50 kg/m    Tele: Afib, CVR, BBB, prolonged QT, pauses  LS: clear on RA  GI: active, last BM 2/18  : BR, PVR 31  Skin: intact  Activity: x2, donnie steady   Diet: reg, poor appetite   Pain: denies  Plan: continue to monitor for spells/staring off, protonix, neuro meds, PT/OT, ASA, promote sleep at night.  Niece at bedside. Dr. Carlson rounded w/both  Niece and daughter (over the phone).           "

## 2019-02-21 NOTE — PROVIDER NOTIFICATION
Text page sent to admitting hospitalist:    Per tele tech 2.4 second pause at [phone]. Annaiol was held this evening. Thank you.    Call back number provided, awaiting new orders/response. Continue to monitor.

## 2019-02-21 NOTE — CONSULTS
CTS:  SW/CC aware of consult. CTS already following with ARU placement. Pt planning for [facility] ARU. CM will update facility on possible discharge date.    Update: [facility] ARU does not do weekend admissions so Monday admission likely. CM to fax latest MD progress note and updated therapy notes Friday 2/22 [phone].    Diana Caldwell RN, BSN CTS  Care Coordinator  [phone]

## 2019-02-21 NOTE — PLAN OF CARE
BP soft, scheduled coreg held. Other VSS on RA.. Pt denies pain, CP, n/v, SOB/HARDY. Rested in bed between cares, up with SBA and GB to bathroom and requested not to be disturbed/rounded on for optimal sleep. Occasionally forgetful, otherwise neuro intact. Tolerating PO meds and regular diet with good appetite. Voiding without saving. BG not requiring sliding scale insulin coverage. Tele in place, Afib with bradycardia. Having frequent pauses, max pause 2.8 seconds overnight. Bed alarms in use, pt not attempting to exit bed. Able to make needs known. Continue to monitor.

## 2019-02-21 NOTE — PLAN OF CARE
Discharge Planner PT   Patient plan for discharge: wants to D/C home.  Current status: Pt having harder time following commands today compared to yesterday and BP elevated more today compared to yesterday despite less activity. 199/75 at end of session following 15 feet of ambulation. Pt was cued for supine to sit, able to perform with min A. Pt was cued for multiple sit to stands, initially posterior LOB with 1st rep, with increased practice was able to maintain balance with in MARIANA with cues for anterior wt shift. Min/mod A x 2. Pt cued for marching in place, difficult time coordinating movement. Pt ataxic with gait pattern using high stepping pattern with increased hip flexion, given manual cues for improved pattern. Pt cued for 15 feet of ambulation, mod A x 2. Pt became more flat and minimally responsive. Chair brought behind pt, BP elevated, needing mod A x 1 for return to supine as not able to coordinate LEs position.  MD informed.   Barriers to return to prior living situation: Current level of A, decreased balance, decreased tolerance to activity   Recommendations for discharge: ARU   Rationale for recommendations: Patient previously independent at baseline and now requiring increased level of A for all mobility. Patient would benefit from continued intensive skilled therapy to further improve strength and balance deficits as well as address functional limitations to decrease risk of falls and improve overall mobility and ambulation prior to safe discharge home.        Entered by: Keri Chapa 02/21/2019 3:38 PM

## 2019-02-22 ENCOUNTER — APPOINTMENT (OUTPATIENT)
Dept: CT IMAGING | Facility: CLINIC | Age: 79
DRG: 383 | End: 2019-02-22
Attending: INTERNAL MEDICINE
Payer: MEDICARE

## 2019-02-22 ENCOUNTER — APPOINTMENT (OUTPATIENT)
Dept: PHYSICAL THERAPY | Facility: CLINIC | Age: 79
DRG: 383 | End: 2019-02-22
Payer: MEDICARE

## 2019-02-22 ENCOUNTER — APPOINTMENT (OUTPATIENT)
Dept: SPEECH THERAPY | Facility: CLINIC | Age: 79
DRG: 383 | End: 2019-02-22
Payer: MEDICARE

## 2019-02-22 ENCOUNTER — APPOINTMENT (OUTPATIENT)
Dept: OCCUPATIONAL THERAPY | Facility: CLINIC | Age: 79
DRG: 383 | End: 2019-02-22
Payer: MEDICARE

## 2019-02-22 LAB
ALBUMIN SERPL-MCNC: 3.2 G/DL (ref 3.4–5)
ALBUMIN UR-MCNC: NEGATIVE MG/DL
ALP SERPL-CCNC: 59 U/L (ref 40–150)
ALT SERPL W P-5'-P-CCNC: 12 U/L (ref 0–50)
AMMONIA PLAS-SCNC: 11 UMOL/L (ref 10–50)
ANION GAP SERPL CALCULATED.3IONS-SCNC: 6 MMOL/L (ref 3–14)
APPEARANCE UR: CLEAR
AST SERPL W P-5'-P-CCNC: 17 U/L (ref 0–45)
BASE EXCESS BLDA CALC-SCNC: 3.1 MMOL/L
BASOPHILS # BLD AUTO: 0.1 10E9/L (ref 0–0.2)
BASOPHILS NFR BLD AUTO: 0.7 %
BILIRUB SERPL-MCNC: 0.5 MG/DL (ref 0.2–1.3)
BILIRUB UR QL STRIP: NEGATIVE
BUN SERPL-MCNC: 11 MG/DL (ref 7–30)
CALCIUM SERPL-MCNC: 9 MG/DL (ref 8.5–10.1)
CHLORIDE SERPL-SCNC: 105 MMOL/L (ref 94–109)
CO2 SERPL-SCNC: 26 MMOL/L (ref 20–32)
COLOR UR AUTO: YELLOW
CREAT SERPL-MCNC: 0.94 MG/DL (ref 0.52–1.04)
DIFFERENTIAL METHOD BLD: ABNORMAL
EOSINOPHIL # BLD AUTO: 0.1 10E9/L (ref 0–0.7)
EOSINOPHIL NFR BLD AUTO: 1.2 %
ERYTHROCYTE [DISTWIDTH] IN BLOOD BY AUTOMATED COUNT: 15.5 % (ref 10–15)
GFR SERPL CREATININE-BSD FRML MDRD: 57 ML/MIN/{1.73_M2}
GLUCOSE BLDC GLUCOMTR-MCNC: 131 MG/DL (ref 70–99)
GLUCOSE BLDC GLUCOMTR-MCNC: 95 MG/DL (ref 70–99)
GLUCOSE SERPL-MCNC: 109 MG/DL (ref 70–99)
GLUCOSE UR STRIP-MCNC: NEGATIVE MG/DL
HCO3 BLD-SCNC: 28 MMOL/L (ref 21–28)
HCT VFR BLD AUTO: 40.6 % (ref 35–47)
HGB BLD-MCNC: 12.5 G/DL (ref 11.7–15.7)
HGB UR QL STRIP: NEGATIVE
IMM GRANULOCYTES # BLD: 0.1 10E9/L (ref 0–0.4)
IMM GRANULOCYTES NFR BLD: 0.7 %
KETONES UR STRIP-MCNC: NEGATIVE MG/DL
LEUKOCYTE ESTERASE UR QL STRIP: NEGATIVE
LYMPHOCYTES # BLD AUTO: 1.2 10E9/L (ref 0.8–5.3)
LYMPHOCYTES NFR BLD AUTO: 17.8 %
MAGNESIUM SERPL-MCNC: 2 MG/DL (ref 1.6–2.3)
MCH RBC QN AUTO: 26.6 PG (ref 26.5–33)
MCHC RBC AUTO-ENTMCNC: 30.8 G/DL (ref 31.5–36.5)
MCV RBC AUTO: 86 FL (ref 78–100)
MONOCYTES # BLD AUTO: 0.7 10E9/L (ref 0–1.3)
MONOCYTES NFR BLD AUTO: 10.3 %
NEUTROPHILS # BLD AUTO: 4.7 10E9/L (ref 1.6–8.3)
NEUTROPHILS NFR BLD AUTO: 69.3 %
NITRATE UR QL: NEGATIVE
NRBC # BLD AUTO: 0 10*3/UL
NRBC BLD AUTO-RTO: 0 /100
O2/TOTAL GAS SETTING VFR VENT: ABNORMAL %
OXYHGB MFR BLD: 92 % (ref 92–100)
PCO2 BLD: 45 MM HG (ref 35–45)
PH BLD: 7.41 PH (ref 7.35–7.45)
PH UR STRIP: 8 PH (ref 5–7)
PLATELET # BLD AUTO: 236 10E9/L (ref 150–450)
PO2 BLD: 66 MM HG (ref 80–105)
POTASSIUM SERPL-SCNC: 3.7 MMOL/L (ref 3.4–5.3)
PROT SERPL-MCNC: 5.9 G/DL (ref 6.8–8.8)
RBC # BLD AUTO: 4.7 10E12/L (ref 3.8–5.2)
RBC #/AREA URNS AUTO: <1 /HPF (ref 0–2)
SODIUM SERPL-SCNC: 137 MMOL/L (ref 133–144)
SOURCE: ABNORMAL
SP GR UR STRIP: 1 (ref 1–1.03)
SQUAMOUS #/AREA URNS AUTO: <1 /HPF (ref 0–1)
UROBILINOGEN UR STRIP-MCNC: 0 MG/DL (ref 0–2)
VIT B12 SERPL-MCNC: 1849 PG/ML (ref 193–986)
WBC # BLD AUTO: 6.8 10E9/L (ref 4–11)
WBC #/AREA URNS AUTO: <1 /HPF (ref 0–5)

## 2019-02-22 PROCEDURE — 82607 VITAMIN B-12: CPT | Performed by: INTERNAL MEDICINE

## 2019-02-22 PROCEDURE — 95816 EEG AWAKE AND DROWSY: CPT

## 2019-02-22 PROCEDURE — 25000132 ZZH RX MED GY IP 250 OP 250 PS 637: Mod: GY | Performed by: INTERNAL MEDICINE

## 2019-02-22 PROCEDURE — 97530 THERAPEUTIC ACTIVITIES: CPT | Mod: GP | Performed by: PHYSICAL THERAPIST

## 2019-02-22 PROCEDURE — 70450 CT HEAD/BRAIN W/O DYE: CPT

## 2019-02-22 PROCEDURE — 82805 BLOOD GASES W/O2 SATURATION: CPT | Performed by: INTERNAL MEDICINE

## 2019-02-22 PROCEDURE — A9270 NON-COVERED ITEM OR SERVICE: HCPCS | Mod: GY | Performed by: INTERNAL MEDICINE

## 2019-02-22 PROCEDURE — 36600 WITHDRAWAL OF ARTERIAL BLOOD: CPT

## 2019-02-22 PROCEDURE — 97110 THERAPEUTIC EXERCISES: CPT | Mod: GP | Performed by: PHYSICAL THERAPIST

## 2019-02-22 PROCEDURE — 36415 COLL VENOUS BLD VENIPUNCTURE: CPT | Performed by: INTERNAL MEDICINE

## 2019-02-22 PROCEDURE — A9270 NON-COVERED ITEM OR SERVICE: HCPCS | Mod: GY | Performed by: PHYSICIAN ASSISTANT

## 2019-02-22 PROCEDURE — 97535 SELF CARE MNGMENT TRAINING: CPT | Mod: GO

## 2019-02-22 PROCEDURE — 40000275 ZZH STATISTIC RCP TIME EA 10 MIN

## 2019-02-22 PROCEDURE — 82140 ASSAY OF AMMONIA: CPT | Performed by: INTERNAL MEDICINE

## 2019-02-22 PROCEDURE — 81001 URINALYSIS AUTO W/SCOPE: CPT | Performed by: INTERNAL MEDICINE

## 2019-02-22 PROCEDURE — 83735 ASSAY OF MAGNESIUM: CPT | Performed by: INTERNAL MEDICINE

## 2019-02-22 PROCEDURE — 12000000 ZZH R&B MED SURG/OB

## 2019-02-22 PROCEDURE — 00000146 ZZHCL STATISTIC GLUCOSE BY METER IP

## 2019-02-22 PROCEDURE — A9270 NON-COVERED ITEM OR SERVICE: HCPCS | Mod: GY | Performed by: PSYCHIATRY & NEUROLOGY

## 2019-02-22 PROCEDURE — 25000132 ZZH RX MED GY IP 250 OP 250 PS 637: Mod: GY | Performed by: PHYSICIAN ASSISTANT

## 2019-02-22 PROCEDURE — 80053 COMPREHEN METABOLIC PANEL: CPT | Performed by: INTERNAL MEDICINE

## 2019-02-22 PROCEDURE — 40000061 ZZH STATISTIC EEG TIME EA 10 MIN

## 2019-02-22 PROCEDURE — 92526 ORAL FUNCTION THERAPY: CPT | Mod: GN

## 2019-02-22 PROCEDURE — 25000132 ZZH RX MED GY IP 250 OP 250 PS 637: Mod: GY | Performed by: PSYCHIATRY & NEUROLOGY

## 2019-02-22 PROCEDURE — 87338 HPYLORI STOOL AG IA: CPT | Performed by: INTERNAL MEDICINE

## 2019-02-22 PROCEDURE — 85025 COMPLETE CBC W/AUTO DIFF WBC: CPT | Performed by: INTERNAL MEDICINE

## 2019-02-22 PROCEDURE — 99233 SBSQ HOSP IP/OBS HIGH 50: CPT | Performed by: INTERNAL MEDICINE

## 2019-02-22 PROCEDURE — 97116 GAIT TRAINING THERAPY: CPT | Mod: GP | Performed by: PHYSICAL THERAPIST

## 2019-02-22 RX ORDER — MULTIPLE VITAMINS W/ MINERALS TAB 9MG-400MCG
1 TAB ORAL DAILY
Status: DISCONTINUED | OUTPATIENT
Start: 2019-02-23 | End: 2019-02-26 | Stop reason: HOSPADM

## 2019-02-22 RX ORDER — LEVETIRACETAM 500 MG/1
500 TABLET ORAL 2 TIMES DAILY
Status: DISCONTINUED | OUTPATIENT
Start: 2019-02-22 | End: 2019-02-26 | Stop reason: HOSPADM

## 2019-02-22 RX ADMIN — LEVETIRACETAM 500 MG: 500 TABLET, FILM COATED ORAL at 21:43

## 2019-02-22 RX ADMIN — ATORVASTATIN CALCIUM 40 MG: 40 TABLET, FILM COATED ORAL at 21:43

## 2019-02-22 RX ADMIN — MEMANTINE 5 MG: 5 TABLET ORAL at 10:27

## 2019-02-22 RX ADMIN — APIXABAN 5 MG: 5 TABLET, FILM COATED ORAL at 21:43

## 2019-02-22 RX ADMIN — DONEPEZIL HYDROCHLORIDE 5 MG: 5 TABLET ORAL at 21:43

## 2019-02-22 RX ADMIN — PANTOPRAZOLE SODIUM 40 MG: 40 TABLET, DELAYED RELEASE ORAL at 21:43

## 2019-02-22 RX ADMIN — LEVETIRACETAM 500 MG: 500 TABLET, FILM COATED ORAL at 12:41

## 2019-02-22 RX ADMIN — PANTOPRAZOLE SODIUM 40 MG: 40 TABLET, DELAYED RELEASE ORAL at 10:28

## 2019-02-22 RX ADMIN — APIXABAN 5 MG: 5 TABLET, FILM COATED ORAL at 10:27

## 2019-02-22 RX ADMIN — AMLODIPINE BESYLATE 2.5 MG: 2.5 TABLET ORAL at 10:28

## 2019-02-22 RX ADMIN — ASPIRIN 81 MG 81 MG: 81 TABLET ORAL at 10:28

## 2019-02-22 RX ADMIN — ACETAMINOPHEN 650 MG: 325 TABLET, FILM COATED ORAL at 14:46

## 2019-02-22 ASSESSMENT — MIFFLIN-ST. JEOR: SCORE: 1116.79

## 2019-02-22 ASSESSMENT — ACTIVITIES OF DAILY LIVING (ADL)
ADLS_ACUITY_SCORE: 16
ADLS_ACUITY_SCORE: 15

## 2019-02-22 NOTE — PLAN OF CARE
Discharge Planner SLP   Patient plan for discharge: did not state  Current status: Pt with multiple episodes of unresponsiveness (most recent last night) with repeat neurological work up - neurology consult from this date noting vascular dementia, complex partial seizures, planning to get an EEG and starting pt on Keppra. This date, she is slow to reposnd, presents with slow movements for slef-feeding, very limited verbalizations unless directly asked a question. Pt seen with small amount of lunch meal consisting of general solids, semi-solids, puree solids, thin liquids - bites of each, limited appetite (baseline per dtr). Overall presentation is slow, but mastication is complete, swallow timely. No overt signs/sx aspiration noted. Dtr in room denies any concerns re: swallowing, pt denying any signs of dysphagia when asked. Recommend continue regular solids/thin liquids with general safe swallow precautions. Will continue to follow, only have orders for swallowing at this time but may complete speech/language evaluation/intervention if needed.   Barriers to return to prior living situation: fluctuating status, assist  Recommendations for discharge: ARU with ongoing SLP  Rationale for recommendations: ongoing SLP to maximize both swallow and speech/language skills (may wait until ARU, need new orders), assist in returning to baseline - good family support.        Entered by: Jessica Roblero 02/22/2019 1:48 PM

## 2019-02-22 NOTE — PROGRESS NOTES
I was called for sinus pauses 2.5-2.8 seconds.  I stopped Coreg.  Continue to monitor on telemetry.  I requested basic metabolic panel and magnesium level with a.m. labs.

## 2019-02-22 NOTE — PLAN OF CARE
Lethargic this am and improves as day progresses. Generalized weakness and steps with exaggerated foot placement out farther than natural. Using gait belt and walker. Right extremities slightly weaker than left. When asked how many fingers she will reach and touch counting. EEG repeated and started on Keppra. No swallow difficulty. Speech soft.

## 2019-02-22 NOTE — PROGRESS NOTES
"[facility]  Neurology Progress Note               Interval History:   Patient had 2 more episodes of unresponsiveness yesterday. She has a blank stare during these spells  and does not respond to verbal and pain stimulation. These spells last 1-2 min and are followed by lethargy and confusion.              Medications:   I have reviewed this patient's current medications               Physical Exam:   Blood pressure 127/64, pulse 58, temperature 97.5  F (36.4  C), temperature source Oral, resp. rate 16, height 1.676 m (5' 6\"), weight 63.5 kg (140 lb), SpO2 95 %.    Awake, alert and oriented to place.  Moves all 4.         Data:   Recent labs, imaging, and other studies were reviewed.         Lab Results   Component Value Date     02/22/2019    Lab Results   Component Value Date    CHLORIDE 105 02/22/2019    Lab Results   Component Value Date    BUN 11 02/22/2019      Lab Results   Component Value Date    POTASSIUM 3.7 02/22/2019    Lab Results   Component Value Date    CO2 26 02/22/2019    Lab Results   Component Value Date    CR 0.94 02/22/2019        Lab Results   Component Value Date    WBC 6.8 02/22/2019    HGB 12.5 02/22/2019    HCT 40.6 02/22/2019    MCV 86 02/22/2019     02/22/2019     No results found for: INR         Assessment and Plan:   Vascular dementia  Complex partial seizures  I will get an EEG.  Start patient on Keppra 500 mg bid     "

## 2019-02-22 NOTE — PROGRESS NOTES
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     Prior to this admission she was living in independent living in the same care center that her  was living in a memory care unit.  Over the past couple of years she has also had some declining memory particularly with short-term memory but has otherwise been able to manage her affairs reasonably well although needed help with complex tasks such as managing finances.  Furthermore she was ambulatory with a walker but had been off balance for the past 1-2 years but that had also declined over the past couple of months.  She was still ambulating but often stop in the middle of her path to reorient herself.  She does have known peripheral neuropathy and complained of a sense of imbalance like she was going to fall frequently.     In any case her primary plant on presentation was abdominal pain was ultimately discovered that she had 2 nonbleeding gastric ulcers.  Her abdominal pain has subsequently resolved with appropriate antacid therapy.     After after her endoscopy she had altered mental status and ultimately getting an MRI that showed right hemispheric embolic strokes.  It should be noted that she was off her apixaban for the 3 and half days prior to this procedure.     She has had an RRT 2 days later on 2/18/2018 that look like she was having episodes of unresponsiveness concerning for further strokes.  Repeat MRI actually showed some improvement in the prior noted right hemispheric strokes.     Since her endoscopy  she has had evidence of delirium in the setting of suspected progressive mild to moderate dementia at home.  She has had episodes of staring off into space that would last minutes prompting an EEG which was consistent with encephalopathy but did not reveal any epileptiform activity.  It is not clear that the EEG was completed during a time of 1 of these episodes.     She remains pleasant, but disoriented, and ambulatory status has been significantly impaired     Problem List:   1. Abdominal pain: Primary reason for presenting to our emergency room.  Looks most consistent with chronic gastric ulcers and substantially improved on twice daily PPI  2. Acute stroke: Looks embolic through her right hemisphere.  Her neuro exam to me is diffuse weakness and if anything she seems more weak on the right.  Not sure that the lack of her apixaban during the early part of her hospitalization in combination with her procedure led to her stroke although that certainly could have I feel like more likely scenario is that this is probably due to plaque rupture.  Nonetheless appreciate telemetry stroke input-she should have overlap of apixaban and baby aspirin for 7 days and then just apixaban alone.  Fasting lipid panel here shows a total cholesterol of 151, HDL of 35 and LDL of 78 -this is on her prior to admission atorvastatin at 20 mg a day.  That has been continued at this time.  I think would be reasonable to increase this to 40 based on my concern for plaque rupture and goal LDL would be less than 70.  She should have repeat LFT and FLP testing in 4-6 weeks all check some baseline LFTs in the morning  3. Episodes where she is disengaged for the moment,, it is unclear if this is just a component of her delirium or this represents  some type of seizure activity.    4. 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  She has had some short-term memory loss over the past  couple of years that seems to have worsened over the past several months.  She is repetitive in her questions and cannot remember certain things like dates.  Is also lost the ability to balance her checkbook and so they have really stepped in to manage that for her.  She still is living independently and has been functional in regards to this although likely require additional services at discharge.  Per neuro they initiated donepezil at 5 mg a day as well as memantine at 5 mg a day.  I doubt that we will have much impact during this hospitalization  5. Delirium: In the setting of mild to moderate dementia not formally diagnosed prior to this admission.  She has a bit of a shuffling gait although I have no idea right now that is due to weakness and/or she has undiagnosed parkinsonism.  Might be reasonable to discuss this further with family members as perhaps a Sinemet might be an appropriate thing to try I have recommended initiation of quetiapine at bedtime on a as needed basis to ensure that we continue normal day night cycle.  She is not agitated enough to really require anything else at this time.  Her prior to admission gabapentin has subsequently been discontinued in the setting of this delirium.  6. Hypertension: Prior to admission medications are amlodipine 10 mg a day, bumetanide 2 mg every morning, lisinopril 5 mg p.o. Daily, and carvedilol 3.125 mg p.o. twice daily.    7. Atrial fibrillation: On chronic anticoagulation with apixaban.  8. ACKD stage II-III-typical creatinines are in the 1-1.2 range-stable throughout her hospitalization  9. Type 2 diabetes: Typically only on metformin.  P.o. intake is poor blood sugars have essentially been normal and hemoglobin A1c on presentation was 6.3%.  I have discontinued all insulin sliding scale and blood sugar checks at this time        Diet: Advance Diet as Tolerated: Regular Diet Adult  Snacks/Supplements Adult: Other; Smoothies 10-2; Between Meals    DVT  Prophylaxis: eliquis  Ramirez Catheter: not present  Code Status: DNR/DNI      Disposition Plan   Expected discharge: 2 - 3 days, recommended to transitional care unit once mental status at baseline.  Entered: Verónica Claros MD 02/22/2019, 9:01 AM       The patient's care was discussed with the Bedside Nurse and Patient's Family. Discussed unclear diagnosis and prognosis with family.    Verónica Claros MD  Hospitalist Service  [facility]    ______________________________________________________________________    Interval History     Lethargic this am. Had episode of unresponsiveness last night.    Data reviewed today: I reviewed all medications, new labs and imaging results over the last 24 hours. I personally reviewed no images or EKG's today.    Physical Exam   Vital Signs: Temp: 98  F (36.7  C) Temp src: Oral BP: 136/66 Pulse: 58 Heart Rate: 59 Resp: 12 SpO2: 97 % O2 Device: None (Room air) Oxygen Delivery: 5 LPM  Weight: 140 lbs 0 oz    Gen - lethargic.  Lungs - CTA B.  Heart - RR,S1+S2 nml, no m/g/r.  Abd - soft, NT, ND, + BS.  Ext - no edema, MONTILLA's.    Data   Recent Labs   Lab 02/22/19  0752 02/20/19  0531 02/19/19  1112 02/19/19  0659  02/16/19  1521  02/15/19  1750 02/15/19  1351   WBC 6.8 5.9 6.1 5.7   < >  --    < >  --   --    HGB 12.5 12.6 12.5 12.3   < >  --    < >  --   --    MCV 86 86 87 86   < >  --    < >  --   --     222 237 243   < >  --    < >  --   --     137  --  138   < >  --    < >  --   --    POTASSIUM 3.7 3.8  --  3.8   < >  --    < >  --   --    CHLORIDE 105 104  --  103   < >  --    < >  --   --    CO2 26 27  --  29   < >  --    < >  --   --    BUN 11 14  --  15   < >  --    < >  --   --    CR 0.94 1.06*  --  1.11*   < >  --    < >  --   --    ANIONGAP 6 6  --  6   < >  --    < >  --   --    CONRADO 9.0 8.8  --  8.8   < >  --    < >  --   --    * 86  --  89   < >  --    < >  --   --    ALBUMIN 3.2*  --   --   --   --  3.4  --   --   --    PROTTOTAL 5.9*  --    --   --   --  6.8  --   --   --    BILITOTAL 0.5  --   --   --   --  0.5  --   --   --    ALKPHOS 59  --   --   --   --  72  --   --   --    ALT 12  --   --   --   --  13  --   --   --    AST 17  --   --   --   --  17  --   --   --    TROPI  --   --  <0.015  --   --   --   --  <0.015 <0.015    < > = values in this interval not displayed.     Recent Results (from the past 24 hour(s))   CT Head w/o Contrast    Narrative    CT SCAN OF THE HEAD WITHOUT CONTRAST  2/22/2019 1:34 AM     HISTORY: Altered level of consciousness, unexplained. Rule out  intracranial hemorrhage.    TECHNIQUE: Axial images of the head and coronal reformations without  IV contrast material. Radiation dose for this scan was reduced using  automated exposure control, adjustment of the mA and/or kV according  to patient size, or iterative reconstruction technique.    COMPARISON: 2/19/2019.    FINDINGS: There is generalized atrophy of the brain. There is low  attenuation in the white matter of the cerebral hemispheres consistent  with sequelae of small vessel ischemic disease. There is no evidence  of intracranial hemorrhage, mass, acute infarct or anomaly. No change  in multiple punctate calcifications in the right cerebral hemisphere.    The visualized portions of the sinuses and mastoids appear normal.  There is no evidence of trauma.      Impression    IMPRESSION: No acute abnormality.    MEERA DAMIAN MD     Medications     - MEDICATION INSTRUCTIONS -       - MEDICATION INSTRUCTIONS -         amLODIPine  2.5 mg Oral Daily     apixaban ANTICOAGULANT  5 mg Oral BID     aspirin  81 mg Oral Daily     atorvastatin  40 mg Oral QPM     donepezil  5 mg Oral At Bedtime     memantine  5 mg Oral Daily     pantoprazole  40 mg Oral BID

## 2019-02-22 NOTE — PROGRESS NOTES
CLINICAL NUTRITION SERVICES - REASSESSMENT NOTE      Recommendations Ordered by Registered Dietitian (RD):     Oral nutrition supplements as accepted    Room service with assist for meal ordering guidance    MVI+M   Future/Additional Recommendations:     ?Appetite stimulant   Malnutrition (2/22):  % Weight Loss: 5% weight loss in <1 month  % Intake:  </= 50% for >/= 5 days (severe malnutrition)  Subcutaneous Fat Loss:  Orbital region mild or greater depletion, upper arm mild or greater depletion  Muscle Loss:  Temporal region moderate depletion, calves moderate depletion, clavicle and shoulder region moderate depletion, hands severe depletion  Fluid Retention:  None noted     Malnutrition Diagnosis: Severe malnutrition  In Context of:  Acute illness or injury  Chronic illness or disease     EVALUATION OF PROGRESS TOWARD GOALS/NEW FINDINGS   Progress towards goals will be monitored and evaluated per protocol and Practice Guidelines    Diet order: Regular diet. Changed to smoothies between meals 2/21 - disliked Boost and high protein jello options.   Per flow sheet review, 25% intake for documented meals. Poor appetite, altered mental status and lethargy remain barriers to intake. Met with patient during SLP eval, daughter at bedside. Bites at best of meals, which is close to baseline per daughter reports. Not drinking Boost, smoothies or shakes despite encouragement to increase PO. Daughter supportive.       BM: 2/22 -- daughter hopeful this will improve appetite    ARU 2/25?    Generalized weakness    Weight: 63 kg - down 13% since admit if correct, using 2/21 weight which likely more accurate, 5% weight loss since admit    Meds: reviewed    Labs:   Recent Labs   Lab Test 02/22/19  0752 02/20/19  0531 02/19/19  0659 02/18/19  0718 02/17/19  1620   POTASSIUM 3.7 3.8 3.8 4.1 4.3     No results for input(s): PHOS in the last 08185 hours.  Recent Labs   Lab Test 02/22/19  0752 02/14/19  0735 02/12/19  0111   MAG 2.0 1.6  2.0     Recent Labs   Lab Test 02/22/19  0752 02/20/19  0531 02/19/19  0659 02/17/19  0617 02/16/19  0547    137 138 137 134     Recent Labs   Lab Test 02/22/19  0752 02/20/19  0531 02/19/19  0659 02/17/19  0617 02/16/19  0547   CR 0.94 1.06* 1.11* 1.23* 1.22*     Recent Labs   Lab 02/22/19  0752 02/20/19  0531 02/19/19  0659 02/17/19  0617 02/16/19  0547 02/16/19  0233 02/15/19  1009   * 86 89 84 100* 194* 93     Lab Results   Component Value Date    A1C 6.3 02/12/2019       ASSESSED NUTRITION NEEDS (PER APPROVED PRACTICE GUIDELINES; DW: 62 kg AdjBW):  Estimated Energy Needs: 5211-2859 kcals (25-30 Kcal/Kg)  Justification: maintenance  Estimated Protein Needs: 75-93 grams protein (1.2-1.8 g pro/Kg)  Justification: preservation of lean body mass, likely slight repletion needs  Estimated Fluid Needs: per MD  Justification: per MD    Previous Goals:   Patient to consume at least 50% of meals TID consistently or the equivalent with supplements to show improvement in PO intakes.   Evaluation: Not met    Previous Nutrition Diagnosis:   Inadequate oral intake related to decreased appetite, abdominal pain?, dislike of diet per SLP as evidenced by meeting <50% needs via bites-75% meal consumption x 7 days since admission, e/o muscle loss, coding of malnutrition.   Evaluation: Ongoing, updated below    MALNUTRITION (Assessed 2/22)  % Weight Loss: 5% weight loss in <1 month  % Intake:  </= 50% for >/= 5 days (severe malnutrition)  Subcutaneous Fat Loss:  Orbital region mild or greater depletion, upper arm mild or greater depletion  Muscle Loss:  Temporal region moderate depletion, calves moderate depletion, clavicle and shoulder region moderate depletion, hands severe depletion  Fluid Retention:  None noted     Malnutrition Diagnosis: Severe malnutrition  In Context of:  Acute illness or injury  Chronic illness or disease    CURRENT NUTRITION DIAGNOSIS  Inadequate oral intake related to decreased appetite,  ?constipation as evidenced by 25% intake for meals x 11 days since admission, evidence of fat and muscle loss, coding of malnutrition and 5% weight loss since admit.     INTERVENTIONS  Recommendations / Nutrition Prescription  Continue regular diet as ordered per SLP + oral nutrition supplements to increase protein and calorie intake (as accepted by patient). Room service with assist for meal ordering guidance to offer half portions and/or scheduled snacks.  Multivitamin+Mineral  ?Appetite stimulant    Implementation  Nutrition education: encouraged ongoing promotion of small/frequent bites when family around, nutrient dense options.  Multivitamin/Minerals: Thera-Vit-M  Medical food supplement: patient/family declined scheduled  Collaboration and Referral of care: Discussed patient during interdisciplinary care rounds this morning    Goals  Patient to consume >/=50% of meals TID  Weight >/= 63.5 kg during review period      MONITORING AND EVALUATION:  Progress towards goals will be monitored and evaluated per protocol and Practice Guidelines      Renetta Alcaraz RDN, LD, [facility]  Pager - 3rd floor/ICU: [phone]  Pager - All other floors: [phone]  Pager - Weekend/holiday: [phone]  Office: [phone]

## 2019-02-22 NOTE — PLAN OF CARE
Discharge Planner PT   Patient plan for discharge: wants to D/C home.  Current status: Pt improved this afternoon. Pt was cued for sit to stand from recliner- improved with no posterior LOB. Pt responding better to questions today and was able to discuss why samia was taking such large steps with increased hip flexion- pt reports she was through there with a step she had to step over. Pt was informed that all areas we would be walking would be flat with only small thresholds. Pt improved walking with this cue. Pt able to improve smoothness of walking, austyn with PT assisting with propelling FWW. Attempting walking with just railing in hallway to increase speed and coordination, continued to have scissoring pattern at times but improved appropriate hip and knee flexion. Tolerating a total of 40 feet, min A x 2 for safety. Pt was cued for timing/coordination with metronome for tapping, difficulty at 50 bpm(lowest setting) cued for sitting exercises- austyn working on timing to match therapist speed, improving with practice. Pt fatigued with this but BP stable.   Barriers to return to prior living situation: Current level of A, decreased balance, decreased tolerance to activity   Recommendations for discharge: ARU   Rationale for recommendations: Patient previously independent at baseline and now requiring increased level of A for all mobility. Patient would benefit from continued intensive skilled therapy to further improve strength and balance deficits as well as address functional limitations to decrease risk of falls and improve overall mobility and ambulation prior to safe discharge home.        Entered by: Keri Chapa 02/22/2019 4:31 PM

## 2019-02-22 NOTE — PROGRESS NOTES
EEG abnormal. The recording shows right temporal slowing with occasional sharp waves. This is an interictal recording consistent wth complex partial seizures.

## 2019-02-22 NOTE — PROGRESS NOTES
CTS:  Updated MD note and Therapy notes faxed to [facility] [phone] for possible admission on Monday 2/25.    Diana Caldwell RN, BSN CTS  Care Coordinator  [phone]

## 2019-02-22 NOTE — PROGRESS NOTES
"Notified by tele tech there has been several pauses on her tele.     Paged/texted hospitalist \"Tele has shown several pauses.  2.8 seconds, and 2.5 seconds.  Please advise if would like an intervention. Thank you\"  "

## 2019-02-22 NOTE — PROGRESS NOTES
EEG Note        Pt completed EEG#19-22 without complication. EEG was ordered by Dr Claros to evaluate for seizure activity.  She was very pleasent and cooperative throughout testing.    February 22, 2019 4:28 PM  Derek Delarosa

## 2019-02-22 NOTE — PROGRESS NOTES
RT- ABG was drawn by myself from left radial on room air at 0155.  Pressure was held until bleeding stopped.  No complications noted.      Jacob Villarreal, RT on 2/22/2019 at 2:01 AM

## 2019-02-22 NOTE — PLAN OF CARE
Pt became unresponsive after she called and was up to bathroom.  RRT was called.    Pt had CT which showed no acute process.  Remained almost lethargic most of the night. Woke up this morning, and became somewhat more alert.    Notified hospitalist for sinus pauses.   Tele: Afib with BBB.

## 2019-02-22 NOTE — PROGRESS NOTES
CTS:  Update from [facility]. They will need updated therapy notes on Monday along with the latest MD progress notes faxed to [phone]. From reviewed notes today they are feeling pt has more cognitive issues going on then initially thought. Pt is to be seen by speech today, CM will send those notes.     RNCC/SW will need to follow up on Monday with [facility] if they are still able to accept the pt or not.    Diana Caldwell RN, BSN CTS  Care Coordinator  [phone]

## 2019-02-22 NOTE — PLAN OF CARE
Discharge Planner OT   Patient plan for discharge: ARU  Current status: Pt completed sit > stand from chair to FWW with min A to achieve upright standing posture/balance. Pt ambulated to/from BR FWW level with min A, unsteadiness noted, 1 significant LOB requiring mod A to correct, pt with improved stepping, however remains impaired (continues to step past midline, large marching steps) despite vcs. Pt able to stand at sink for hair combing, required 1 hand assist on surface at all times to maintain balance as well as min A. Pt with improved processing and command following this date. Improved affect.   Barriers to return to prior living situation: Current level of assist for ADLs/transfers, decreased strength/functional activity tolerance  Recommendations for discharge: ARU  Rationale for recommendations: Pt would benefit from ongoing skilled OT to maximize safety and indep in all ADLs/IADLs and mobility tasks. Pt is motivated to return to indep PLOF, has strong family support. Pt reports facility could increase services if needed.         Entered by: Blanche Watts 02/22/2019 1:21 PM

## 2019-02-22 NOTE — PROGRESS NOTES
I responded to code rapid response which was called for decreased level of consciousness.  Patient was not answering questions and stared with vacant gaze. She did respond to sternal rub but did not awake.  Vital signs were stable. BG was 95.      It appears that this is her 2nd or 3rd similar episode of this since admission- no clear cause found with prior episodes and CT of head, MRI of head, and EEG have been unremarkable (except slowing c/w encephalopathy on EEG)      I ordered ABG which showed PaO2 of 66 but no other abnormality.  I ordered CT of head which showed no acute process.  Continue to monitor.

## 2019-02-23 ENCOUNTER — APPOINTMENT (OUTPATIENT)
Dept: PHYSICAL THERAPY | Facility: CLINIC | Age: 79
DRG: 383 | End: 2019-02-23
Payer: MEDICARE

## 2019-02-23 ENCOUNTER — APPOINTMENT (OUTPATIENT)
Dept: OCCUPATIONAL THERAPY | Facility: CLINIC | Age: 79
DRG: 383 | End: 2019-02-23
Payer: MEDICARE

## 2019-02-23 ENCOUNTER — APPOINTMENT (OUTPATIENT)
Dept: SPEECH THERAPY | Facility: CLINIC | Age: 79
DRG: 383 | End: 2019-02-23
Payer: MEDICARE

## 2019-02-23 PROCEDURE — A9270 NON-COVERED ITEM OR SERVICE: HCPCS | Mod: GY | Performed by: INTERNAL MEDICINE

## 2019-02-23 PROCEDURE — 25000132 ZZH RX MED GY IP 250 OP 250 PS 637: Mod: GY | Performed by: INTERNAL MEDICINE

## 2019-02-23 PROCEDURE — 99207 ZZC CDG-MDM COMPONENT: MEETS LOW - DOWN CODED: CPT | Performed by: INTERNAL MEDICINE

## 2019-02-23 PROCEDURE — A9270 NON-COVERED ITEM OR SERVICE: HCPCS | Mod: GY | Performed by: PSYCHIATRY & NEUROLOGY

## 2019-02-23 PROCEDURE — 12000000 ZZH R&B MED SURG/OB

## 2019-02-23 PROCEDURE — A9270 NON-COVERED ITEM OR SERVICE: HCPCS | Mod: GY | Performed by: PHYSICIAN ASSISTANT

## 2019-02-23 PROCEDURE — 97535 SELF CARE MNGMENT TRAINING: CPT | Mod: GO | Performed by: OCCUPATIONAL THERAPIST

## 2019-02-23 PROCEDURE — 97116 GAIT TRAINING THERAPY: CPT | Mod: GP | Performed by: PHYSICAL THERAPIST

## 2019-02-23 PROCEDURE — 25000132 ZZH RX MED GY IP 250 OP 250 PS 637: Mod: GY | Performed by: PHYSICIAN ASSISTANT

## 2019-02-23 PROCEDURE — 99232 SBSQ HOSP IP/OBS MODERATE 35: CPT | Performed by: INTERNAL MEDICINE

## 2019-02-23 PROCEDURE — 25000132 ZZH RX MED GY IP 250 OP 250 PS 637: Mod: GY | Performed by: PSYCHIATRY & NEUROLOGY

## 2019-02-23 PROCEDURE — 92526 ORAL FUNCTION THERAPY: CPT | Mod: GN | Performed by: SPEECH-LANGUAGE PATHOLOGIST

## 2019-02-23 PROCEDURE — 97530 THERAPEUTIC ACTIVITIES: CPT | Mod: GP | Performed by: PHYSICAL THERAPIST

## 2019-02-23 RX ADMIN — AMLODIPINE BESYLATE 2.5 MG: 2.5 TABLET ORAL at 08:55

## 2019-02-23 RX ADMIN — APIXABAN 5 MG: 5 TABLET, FILM COATED ORAL at 21:10

## 2019-02-23 RX ADMIN — ASPIRIN 81 MG 81 MG: 81 TABLET ORAL at 08:55

## 2019-02-23 RX ADMIN — PANTOPRAZOLE SODIUM 40 MG: 40 TABLET, DELAYED RELEASE ORAL at 21:10

## 2019-02-23 RX ADMIN — DONEPEZIL HYDROCHLORIDE 5 MG: 5 TABLET ORAL at 21:10

## 2019-02-23 RX ADMIN — MULTIPLE VITAMINS W/ MINERALS TAB 1 TABLET: TAB at 08:55

## 2019-02-23 RX ADMIN — MEMANTINE 5 MG: 5 TABLET ORAL at 08:56

## 2019-02-23 RX ADMIN — LEVETIRACETAM 500 MG: 500 TABLET, FILM COATED ORAL at 08:55

## 2019-02-23 RX ADMIN — LEVETIRACETAM 500 MG: 500 TABLET, FILM COATED ORAL at 21:10

## 2019-02-23 RX ADMIN — APIXABAN 5 MG: 5 TABLET, FILM COATED ORAL at 08:56

## 2019-02-23 RX ADMIN — PANTOPRAZOLE SODIUM 40 MG: 40 TABLET, DELAYED RELEASE ORAL at 08:55

## 2019-02-23 RX ADMIN — ATORVASTATIN CALCIUM 40 MG: 40 TABLET, FILM COATED ORAL at 21:10

## 2019-02-23 ASSESSMENT — ACTIVITIES OF DAILY LIVING (ADL)
ADLS_ACUITY_SCORE: 16

## 2019-02-23 ASSESSMENT — MIFFLIN-ST. JEOR: SCORE: 1170.76

## 2019-02-23 NOTE — PLAN OF CARE
Discharge Planner OT   Patient plan for discharge: ARU  Current status: pt. seated in bed with family present and agreeable to therapy.  OT completed MoCA version 7.2 with pt.  Pt. and family able to recall previous answers of 7.1.  Pt. scored 17/30.  Pt. able to complete visiospatial executive, two tries for cube, and complete clock.  Pt. able to recall 2/3 animals and repeat numbers accurately.  Pt. struggled with delayed recall but was able to recall some items from previous test.  With category cue, pt. able to recall 3/5 and with multiple choice 2/2.  Pt. able to make associations with words, repeat one sentence, tap finger for letter recognition, and complete 3 correct serial 7 subtractions.  Pt. and family note improvement in cognition and recall and hopeful for continued improvement.  Barriers to return to prior living situation: Current level of assist for ADLs/transfers, decreased strength/functional activity tolerance  Recommendations for discharge: ARU  Rationale for recommendations: Pt would benefit from ongoing skilled OT to maximize safety and indep in all ADLs/IADLs and mobility tasks. Pt is motivated to return to indep PLOF, has strong family support. Pt reports facility could increase services if needed.         Entered by: Ruth Castellon 02/23/2019 4:36 PM

## 2019-02-23 NOTE — PLAN OF CARE
Discharge Planner PT   Patient plan for discharge: wants to D/C home.  Current status:  Pt much more conversant this afternoon. Pt coordination still biggest issue with mobility. Pt was cued for supine to sit, min A. Pt was cued for sit<>Stand, mild difficulty with wt shifting today with posterior LOB. Pt cued for use of 4WW as yesterday doing better when therapist navigating FWW. Pt poor tolerance to 4ww, more LOB with mod A x 1 for safety with this. Ambulating 30 feet, Cues throughout for posture, WBOS as scissoring. Used FWW instead for 2nd bout of ambulation. Pt was able to perform 30 more feet of ambulation, but had min A X 2, due to her freq LOB today. Pt cued for return to supine, able to desribe that she needed help with transition today which is a great improvement since Thursday. BP stable with this.   Barriers to return to prior living situation: Current level of A, decreased balance, decreased tolerance to activity   Recommendations for discharge: ARU   Rationale for recommendations: Patient previously independent at baseline and now requiring increased level of A for all mobility. Patient would benefit from continued intensive skilled therapy to further improve strength and balance deficits as well as address functional limitations to decrease risk of falls and improve overall mobility and ambulation prior to safe discharge home.        Entered by: Keri Chapa 02/23/2019 2:25 PM

## 2019-02-23 NOTE — PLAN OF CARE
Pt. alert, forgetful, Pt. resting in bed most of the shift,  up with assist of 1-2, walker and gait belt, Tele: A.Fib pauses up to 2.6 sec, HR 40's to 60's. Lung sounds diminished, room air sat's at 96-98%. Pt. denies any needs at this time. Will continue with POC.

## 2019-02-23 NOTE — PLAN OF CARE
Discharge Planner SLP   Patient plan for discharge: did not state  Current status: Patient seen for swallow therapy at lunch meal. Client positioned upright in bed with family at bedside. Patient had ordered soup and a fruit platter with cottage cheese. Drinking coffee and needed cue to initiate feeding. Patient consumed one bite of soup, one bite of cottage cheese, and one grape. Patient had no overt s/sx of aspiration and adequate oral phase with no oral residue. Patient states that she is full and declines further po. Family reports that patient is a very light eater and maybe eats one meal per day (evening meal). Unable to encourage to take more. Education provided to patient and family re: ARU and need for cognitive-linguistic evaluation at next level of care (anticipate discontinue to ARU on Monday).  Barriers to return to prior living situation: medical status  Recommendations for discharge: ARU  Rationale for recommendations: continued ST for diet tolerance & strategies and cognitive-linguistic eval       Entered by: Alber Das 02/23/2019 1:01 PM

## 2019-02-23 NOTE — PLAN OF CARE
"Much more alert at start of shift as compared to yesterday. Speech louder. Answers questions appropriately. Processing is slow with commands like to shrug shoulders or when asked if she could feel touch to areas. Ambulated to the bathroom with gait belt and walker and short distance in craven with one sitting break mid way. She has some balance issues and steps beyond natural gait and she verbalizes \"it is the steps going up.\" Family and dog at bedside. Anxious to get home.  "

## 2019-02-23 NOTE — PROGRESS NOTES
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     Prior to this admission she was living in independent living in the same care center that her  was living in a memory care unit.  Over the past couple of years she has also had some declining memory particularly with short-term memory but has otherwise been able to manage her affairs reasonably well although needed help with complex tasks such as managing finances.  Furthermore she was ambulatory with a walker but had been off balance for the past 1-2 years but that had also declined over the past couple of months.  She was still ambulating but often stop in the middle of her path to reorient herself.  She does have known peripheral neuropathy and complained of a sense of imbalance like she was going to fall frequently.     In any case her primary plant on presentation was abdominal pain was ultimately discovered that she had 2 nonbleeding gastric ulcers.  Her abdominal pain has subsequently resolved with appropriate antacid therapy.     After after her endoscopy she had altered mental status and ultimately getting an MRI that showed right hemispheric embolic strokes.  It should be noted that she was off her apixaban for the 3 and half days prior to this procedure.     She has had an RRT 2 days later on 2/18/2018 that look like she was having episodes of unresponsiveness concerning for further strokes.  Repeat MRI actually showed some improvement in the prior noted right hemispheric strokes.     Since her endoscopy  she has had evidence of delirium in the setting of suspected progressive mild to moderate dementia at home.  She has had episodes of staring off into space that would last minutes prompting an EEG which was consistent with encephalopathy but did not reveal any epileptiform activity.  It is not clear that the EEG was completed during a time of 1 of these episodes.     She remains pleasant, but disoriented, and ambulatory status has been significantly impaired     Problem List:   1. Abdominal pain: Primary reason for presenting to our emergency room.  Looks most consistent with chronic gastric ulcers and substantially improved on twice daily PPI  2. Acute stroke: Looks embolic through her right hemisphere.  Her neuro exam to me is diffuse weakness and if anything she seems more weak on the right.  Not sure that the lack of her apixaban during the early part of her hospitalization in combination with her procedure led to her stroke although that certainly could have I feel like more likely scenario is that this is probably due to plaque rupture.  Nonetheless appreciate telemetry stroke input-she should have overlap of apixaban and baby aspirin for 7 days and then just apixaban alone.  Fasting lipid panel here shows a total cholesterol of 151, HDL of 35 and LDL of 78 -this is on her prior to admission atorvastatin at 20 mg a day.  That has been continued at this time.  I think would be reasonable to increase this to 40 based on my concern for plaque rupture and goal LDL would be less than 70.  She should have repeat LFT and FLP testing in 4-6 weeks all check some baseline LFTs in the morning  3. 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  She has had some short-term memory loss over the past couple of years that seems to have worsened over the past several months.  She is repetitive in her questions and cannot remember certain things like dates.  Is also lost  the ability to balance her checkbook and so they have really stepped in to manage that for her.  She still is living independently and has been functional in regards to this although likely require additional services at discharge.  Per neuro they initiated donepezil at 5 mg a day as well as memantine at 5 mg a day.  I doubt that we will have much impact during this hospitalization  4. Acute encephalopathy likely due to complex partial seizure activity. EEG 2/22 confirms this. Started on PO keppra.  5. Hypertension: on norvasc.  6. Atrial fibrillation: On chronic anticoagulation with apixaban.  7. ACKD stage II-III-typical creatinines are in the 1-1.2 range-stable throughout her hospitalization  8. Type 2 diabetes: Typically only on metformin.  P.o. intake is poor blood sugars have essentially been normal and hemoglobin A1c on presentation was 6.3%.  I have discontinued all insulin sliding scale and blood sugar checks at this time           Diet: Advance Diet as Tolerated: Regular Diet Adult  Room Service  Snacks/Supplements Adult: Other; ok to order prn; With Meals    DVT Prophylaxis: Pneumatic Compression Devices  Ramirez Catheter: not present  Code Status: DNR/DNI      Disposition Plan   Expected discharge: 2 - 3 days, recommended to transitional care unit once mental status at baseline.  Entered: Verónica Claros MD 02/23/2019, 8:33 AM       The patient's care was discussed with the Bedside Nurse and Patient.    Verónica Claros MD  Hospitalist Service  [facility]    ______________________________________________________________________    Interval History     Awake and lucid this am. Able to recognize her son in law.    Data reviewed today: I reviewed all medications, new labs and imaging results over the last 24 hours. I personally reviewed no images or EKG's today.    Physical Exam   Vital Signs: Temp: 99.1  F (37.3  C) Temp src: Oral BP: 132/66   Heart Rate: 70 Resp: 16 SpO2: 97 % O2 Device: None  (Room air)    Weight: 151 lbs 14.4 oz    Gen - alert, awake, slow to respond to Q's and commands. Oriented to self, knows its Feb, but thinks its 2018.  Lungs - CTA B.  Heart - RR,S1+S2 nml, no m/g/r.  Abd - soft, NT, ND, + BS.  Ext - no edema, MONTILLA's.          Data   Recent Labs   Lab 02/22/19  0752 02/20/19  0531 02/19/19  1112 02/19/19  0659  02/16/19  1521   WBC 6.8 5.9 6.1 5.7   < >  --    HGB 12.5 12.6 12.5 12.3   < >  --    MCV 86 86 87 86   < >  --     222 237 243   < >  --     137  --  138   < >  --    POTASSIUM 3.7 3.8  --  3.8   < >  --    CHLORIDE 105 104  --  103   < >  --    CO2 26 27  --  29   < >  --    BUN 11 14  --  15   < >  --    CR 0.94 1.06*  --  1.11*   < >  --    ANIONGAP 6 6  --  6   < >  --    CONRADO 9.0 8.8  --  8.8   < >  --    * 86  --  89   < >  --    ALBUMIN 3.2*  --   --   --   --  3.4   PROTTOTAL 5.9*  --   --   --   --  6.8   BILITOTAL 0.5  --   --   --   --  0.5   ALKPHOS 59  --   --   --   --  72   ALT 12  --   --   --   --  13   AST 17  --   --   --   --  17   TROPI  --   --  <0.015  --   --   --     < > = values in this interval not displayed.     No results found for this or any previous visit (from the past 24 hour(s)).  Medications     - MEDICATION INSTRUCTIONS -       - MEDICATION INSTRUCTIONS -         amLODIPine  2.5 mg Oral Daily     apixaban ANTICOAGULANT  5 mg Oral BID     aspirin  81 mg Oral Daily     atorvastatin  40 mg Oral QPM     donepezil  5 mg Oral At Bedtime     levETIRAcetam  500 mg Oral BID     memantine  5 mg Oral Daily     multivitamin w/minerals  1 tablet Oral Daily     pantoprazole  40 mg Oral BID

## 2019-02-23 NOTE — PROCEDURES
Procedure Date: 2019      ELECTROENCEPHALOGRAM       EEG #19-21       DATE OF EXAM:  2019 at 1315 hours.      SUMMARY OF FINDINGS:  This is an 18-channel routine portable EEG with 1 channel of EKG.  The EEG shows well-regulated posterior dominant alpha rhythm of 9 Hz frequency that is reactive to eye opening.  There is frequent right temporal slowing in the delta range.  There are occasional right temporal sharp waves.  There is occasional spread of slowing to the left temporal leads.  There was no definitive seizure recorded during this recording.  The EKG shows atrial fibrillation with irregularly irregular rhythm of 66 beats per minute.      IMPRESSION:  This is an abnormal electroencephalographic study showing intermittent right temporal slowing with occasional sharp waves.  This may be indicative of interictal recording and may indicate towards complex partial seizures originating in the right temporal lobe.  Clinical correlation is recommended.         BHAVANA XIE MD             D: 2019   T: 2019   MT: LIANAN      Name:     MARGO CANDELARIO   MRN:      [id number]        Account:        [id number]   :      1940           Procedure Date: 2019      Document: [id number]

## 2019-02-24 ENCOUNTER — APPOINTMENT (OUTPATIENT)
Dept: SPEECH THERAPY | Facility: CLINIC | Age: 79
DRG: 383 | End: 2019-02-24
Payer: MEDICARE

## 2019-02-24 ENCOUNTER — APPOINTMENT (OUTPATIENT)
Dept: PHYSICAL THERAPY | Facility: CLINIC | Age: 79
DRG: 383 | End: 2019-02-24
Payer: MEDICARE

## 2019-02-24 ENCOUNTER — APPOINTMENT (OUTPATIENT)
Dept: OCCUPATIONAL THERAPY | Facility: CLINIC | Age: 79
DRG: 383 | End: 2019-02-24
Payer: MEDICARE

## 2019-02-24 LAB
ANION GAP SERPL CALCULATED.3IONS-SCNC: 7 MMOL/L (ref 3–14)
BUN SERPL-MCNC: 11 MG/DL (ref 7–30)
CALCIUM SERPL-MCNC: 9.1 MG/DL (ref 8.5–10.1)
CHLORIDE SERPL-SCNC: 106 MMOL/L (ref 94–109)
CO2 SERPL-SCNC: 26 MMOL/L (ref 20–32)
CREAT SERPL-MCNC: 0.99 MG/DL (ref 0.52–1.04)
GFR SERPL CREATININE-BSD FRML MDRD: 53 ML/MIN/{1.73_M2}
GLUCOSE BLDC GLUCOMTR-MCNC: 102 MG/DL (ref 70–99)
GLUCOSE BLDC GLUCOMTR-MCNC: 148 MG/DL (ref 70–99)
GLUCOSE BLDC GLUCOMTR-MCNC: 180 MG/DL (ref 70–99)
GLUCOSE BLDC GLUCOMTR-MCNC: 85 MG/DL (ref 70–99)
GLUCOSE SERPL-MCNC: 137 MG/DL (ref 70–99)
POTASSIUM SERPL-SCNC: 4 MMOL/L (ref 3.4–5.3)
SODIUM SERPL-SCNC: 139 MMOL/L (ref 133–144)

## 2019-02-24 PROCEDURE — 97530 THERAPEUTIC ACTIVITIES: CPT | Mod: GP

## 2019-02-24 PROCEDURE — 97110 THERAPEUTIC EXERCISES: CPT | Mod: GP

## 2019-02-24 PROCEDURE — 12000000 ZZH R&B MED SURG/OB

## 2019-02-24 PROCEDURE — 25000132 ZZH RX MED GY IP 250 OP 250 PS 637: Mod: GY | Performed by: PHYSICIAN ASSISTANT

## 2019-02-24 PROCEDURE — 25000132 ZZH RX MED GY IP 250 OP 250 PS 637: Mod: GY | Performed by: INTERNAL MEDICINE

## 2019-02-24 PROCEDURE — A9270 NON-COVERED ITEM OR SERVICE: HCPCS | Mod: GY | Performed by: INTERNAL MEDICINE

## 2019-02-24 PROCEDURE — 97116 GAIT TRAINING THERAPY: CPT | Mod: GP

## 2019-02-24 PROCEDURE — 97535 SELF CARE MNGMENT TRAINING: CPT | Mod: GO | Performed by: OCCUPATIONAL THERAPIST

## 2019-02-24 PROCEDURE — A9270 NON-COVERED ITEM OR SERVICE: HCPCS | Mod: GY | Performed by: PSYCHIATRY & NEUROLOGY

## 2019-02-24 PROCEDURE — 80048 BASIC METABOLIC PNL TOTAL CA: CPT | Performed by: INTERNAL MEDICINE

## 2019-02-24 PROCEDURE — 36415 COLL VENOUS BLD VENIPUNCTURE: CPT | Performed by: INTERNAL MEDICINE

## 2019-02-24 PROCEDURE — A9270 NON-COVERED ITEM OR SERVICE: HCPCS | Mod: GY | Performed by: PHYSICIAN ASSISTANT

## 2019-02-24 PROCEDURE — 92526 ORAL FUNCTION THERAPY: CPT | Mod: GN | Performed by: SPEECH-LANGUAGE PATHOLOGIST

## 2019-02-24 PROCEDURE — 25000132 ZZH RX MED GY IP 250 OP 250 PS 637: Mod: GY | Performed by: PSYCHIATRY & NEUROLOGY

## 2019-02-24 PROCEDURE — 99207 ZZC CDG-MDM COMPONENT: MEETS LOW - DOWN CODED: CPT | Performed by: INTERNAL MEDICINE

## 2019-02-24 PROCEDURE — 25000131 ZZH RX MED GY IP 250 OP 636 PS 637: Mod: GY | Performed by: INTERNAL MEDICINE

## 2019-02-24 PROCEDURE — 00000146 ZZHCL STATISTIC GLUCOSE BY METER IP

## 2019-02-24 PROCEDURE — 99232 SBSQ HOSP IP/OBS MODERATE 35: CPT | Performed by: INTERNAL MEDICINE

## 2019-02-24 RX ORDER — DEXTROSE MONOHYDRATE 25 G/50ML
25-50 INJECTION, SOLUTION INTRAVENOUS
Status: DISCONTINUED | OUTPATIENT
Start: 2019-02-24 | End: 2019-02-24

## 2019-02-24 RX ORDER — NICOTINE POLACRILEX 4 MG
15-30 LOZENGE BUCCAL
Status: DISCONTINUED | OUTPATIENT
Start: 2019-02-24 | End: 2019-02-24

## 2019-02-24 RX ADMIN — APIXABAN 5 MG: 5 TABLET, FILM COATED ORAL at 08:39

## 2019-02-24 RX ADMIN — MULTIPLE VITAMINS W/ MINERALS TAB 1 TABLET: TAB at 08:39

## 2019-02-24 RX ADMIN — LEVETIRACETAM 500 MG: 500 TABLET, FILM COATED ORAL at 20:46

## 2019-02-24 RX ADMIN — PANTOPRAZOLE SODIUM 40 MG: 40 TABLET, DELAYED RELEASE ORAL at 08:39

## 2019-02-24 RX ADMIN — MEMANTINE 5 MG: 5 TABLET ORAL at 08:39

## 2019-02-24 RX ADMIN — ASPIRIN 81 MG 81 MG: 81 TABLET ORAL at 08:39

## 2019-02-24 RX ADMIN — DONEPEZIL HYDROCHLORIDE 5 MG: 5 TABLET ORAL at 20:46

## 2019-02-24 RX ADMIN — PANTOPRAZOLE SODIUM 40 MG: 40 TABLET, DELAYED RELEASE ORAL at 20:46

## 2019-02-24 RX ADMIN — ATORVASTATIN CALCIUM 40 MG: 40 TABLET, FILM COATED ORAL at 20:46

## 2019-02-24 RX ADMIN — APIXABAN 5 MG: 5 TABLET, FILM COATED ORAL at 20:46

## 2019-02-24 RX ADMIN — AMLODIPINE BESYLATE 2.5 MG: 2.5 TABLET ORAL at 08:38

## 2019-02-24 RX ADMIN — LEVETIRACETAM 500 MG: 500 TABLET, FILM COATED ORAL at 08:38

## 2019-02-24 RX ADMIN — INSULIN ASPART 1 UNITS: 100 INJECTION, SOLUTION INTRAVENOUS; SUBCUTANEOUS at 13:28

## 2019-02-24 ASSESSMENT — ACTIVITIES OF DAILY LIVING (ADL)
ADLS_ACUITY_SCORE: 16

## 2019-02-24 ASSESSMENT — MIFFLIN-ST. JEOR: SCORE: 1173.49

## 2019-02-24 NOTE — PLAN OF CARE
Alert and oriented x4. Thought process is slow but able to follow commands. Gait remains unbalanced and requires walker and gait belt with strong hand hold. Can carry on fluent conversation. Glucose checks 148, 180. Started on sliding scale insulin with possible resumption of Metformin. Anticipate discharge to ARU soon.

## 2019-02-24 NOTE — PLAN OF CARE
Pt. Alert, forgetful, up with assist of 1-2, gait belt and walker, unsteady gait. Pt. Denies any c/o pain. Lungs clear, room air sat's at 98%. Pt. Continues on Eliquis for A.fib and CVA and is on Keppra for recent Sz activity. Pt. Denies any needs at this time. Will continue with POC.

## 2019-02-24 NOTE — PLAN OF CARE
Discharge Planner SLP   Patient plan for discharge: wants to go home  Current status: Patient seen for swallow treatment this AM. Sitting in chair, alert and cooperative. Much improved cognition today. Conversant, appropriate pragmatics, engaged. When asked about getting supplements, patient does not recall getting anything. Offered snack and patient agreed to pudding and water. Tolerating with adequate oral phase, good oral clearing, and no overt s/sx of aspiration. When drinking, patient would bring cup to mouth several times as if she didn't know what to do with it or forgot what she was doing, and then would drink. Recommend continue with a Regular diet with thin liquids and safe swallow guidelines.  Barriers to return to prior living situation: Current level of A, decreased balance, decreased tolerance to activity, safety/cognition  Recommendations for discharge: ARU  Rationale for recommendations: continued speech to monitor dysphagia and initiate cognitive-linguistic evaluation       Entered by: Alber Das 02/24/2019 10:37 AM

## 2019-02-24 NOTE — PLAN OF CARE
Lethargic, slow to respond to commands. VSS, ex HR in 40-50's. Tele Afib CVR with pauses up to 3.6 sec. Denies pain.   Maria De Jesus Altamirano RN on 2/24/2019 at 5:11 AM

## 2019-02-24 NOTE — PROGRESS NOTES
[facility]    Medicine Progress Note - Hospitalist Service       Date of Admission:  2/11/2019  Assessment & Plan      Summary of Stay: Rhonda Mathur is a 79 year old female with a history of multiple medical problems, chronic atrial fibrillation on oral anticoagulation, coronary artery disease, chronic diastolic heart failure with preserved ejection fraction, CKD stage II-III with baseline creatinine 1-1.2 range, hyperlipidemia, anemia admitted on 2/11/2019 with abdominal pain.     Prior to this admission she was living in independent living in the same care center that her  was living in a memory care unit.  Over the past couple of years she has also had some declining memory particularly with short-term memory but has otherwise been able to manage her affairs reasonably well although needed help with complex tasks such as managing finances.  Furthermore she was ambulatory with a walker but had been off balance for the past 1-2 years but that had also declined over the past couple of months.  She was still ambulating but often stop in the middle of her path to reorient herself.  She does have known peripheral neuropathy and complained of a sense of imbalance like she was going to fall frequently.     In any case her primary plant on presentation was abdominal pain was ultimately discovered that she had 2 nonbleeding gastric ulcers.  Her abdominal pain has subsequently resolved with appropriate antacid therapy.     After after her endoscopy she had altered mental status and ultimately getting an MRI that showed right hemispheric embolic strokes.  It should be noted that she was off her apixaban for the 3 and half days prior to this procedure.     She has had an RRT 2 days later on 2/18/2018 that look like she was having episodes of unresponsiveness concerning for further strokes.  Repeat MRI actually showed some improvement in the prior noted right hemispheric strokes.     Since her endoscopy  she has had evidence of delirium in the setting of suspected progressive mild to moderate dementia at home.  She has had episodes of staring off into space that would last minutes prompting an EEG which was consistent with encephalopathy but did not reveal any epileptiform activity.  It is not clear that the EEG was completed during a time of 1 of these episodes.     She remains pleasant, and ambulatory status has been significantly impaired     Problem List:   1. Abdominal pain: Primary reason for presenting to our emergency room.  Looks most consistent with chronic gastric ulcers and substantially improved on twice daily PPI  2. Acute stroke: Looks embolic through her right hemisphere.  Her neuro exam to me is diffuse weakness and if anything she seems more weak on the right.  Not sure that the lack of her apixaban during the early part of her hospitalization in combination with her procedure led to her stroke although that certainly could have I feel like more likely scenario is that this is probably due to plaque rupture.  Nonetheless appreciate telemetry stroke input-she should have overlap of apixaban and baby aspirin for 7 days and then just apixaban alone.  Fasting lipid panel here shows a total cholesterol of 151, HDL of 35 and LDL of 78 -this is on her prior to admission atorvastatin at 20 mg a day.  That has been continued at this time.  I think would be reasonable to increase this to 40 based on my concern for plaque rupture and goal LDL would be less than 70.  She should have repeat LFT and FLP testing in 4-6 weeks all check some baseline LFTs in the morning  3. 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  She has had some short-term memory loss over the past couple of years that seems to have worsened over the past several months.  She is repetitive in her questions and cannot remember certain things like dates.  Is also lost the ability to  balance her checkbook and so they have really stepped in to manage that for her.  She still is living independently and has been functional in regards to this although likely require additional services at discharge.  Per neuro they initiated donepezil at 5 mg a day as well as memantine at 5 mg a day.  I doubt that we will have much impact during this hospitalization  4. Acute encephalopathy likely due to complex partial seizure activity. EEG 2/22 confirms this. Started on PO keppra.  5. Hypertension: on norvasc.  6. Atrial fibrillation: On chronic anticoagulation with apixaban. Having multiple pauses, however, asymptomatic.   7. ACKD stage II-III-typical creatinines are in the 1-1.2 range-stable throughout her hospitalization  8. Type 2 diabetes: Typically only on metformin.  start sliding scale insulin.            Diet: Advance Diet as Tolerated: Regular Diet Adult  Room Service  Snacks/Supplements Adult: Other; ok to order prn; With Meals    DVT Prophylaxis: ELIQUIS  Ramirez Catheter: not present  Code Status: DNR/DNI      Disposition Plan   Expected discharge: Tomorrow, recommended to transitional care unit once mental status at baseline.  Entered: Verónica Claros MD 02/24/2019, 8:20 AM       The patient's care was discussed with the Patient.    Verónica Claros MD  Hospitalist Service  [facility]    ______________________________________________________________________    Interval History     Awake and lucid. No chest pain/SOB/N/V.    Data reviewed today: I reviewed all medications, new labs and imaging results over the last 24 hours. I personally reviewed no images or EKG's today.    Physical Exam   Vital Signs: Temp: 96.5  F (35.8  C) Temp src: Oral BP: 136/48   Heart Rate: 53 Resp: 16 SpO2: 94 % O2 Device: None (Room air)    Weight: 152 lbs 8 oz    Gen - alert, awake, slow to respond to Q's and commands. Oriented to self, knows its Feb, but thinks its 2018.  Lungs - CTA B.  Heart - RR,S1+S2 nml,  no m/g/r.  Abd - soft, NT, ND, + BS.  Ext - no edema, MONTILLA's with some RLE weakness.          Data   Recent Labs   Lab 02/22/19  0752 02/20/19  0531 02/19/19  1112 02/19/19  0659   WBC 6.8 5.9 6.1 5.7   HGB 12.5 12.6 12.5 12.3   MCV 86 86 87 86    222 237 243    137  --  138   POTASSIUM 3.7 3.8  --  3.8   CHLORIDE 105 104  --  103   CO2 26 27  --  29   BUN 11 14  --  15   CR 0.94 1.06*  --  1.11*   ANIONGAP 6 6  --  6   CONRADO 9.0 8.8  --  8.8   * 86  --  89   ALBUMIN 3.2*  --   --   --    PROTTOTAL 5.9*  --   --   --    BILITOTAL 0.5  --   --   --    ALKPHOS 59  --   --   --    ALT 12  --   --   --    AST 17  --   --   --    TROPI  --   --  <0.015  --      No results found for this or any previous visit (from the past 24 hour(s)).  Medications     - MEDICATION INSTRUCTIONS -       - MEDICATION INSTRUCTIONS -         amLODIPine  2.5 mg Oral Daily     apixaban ANTICOAGULANT  5 mg Oral BID     aspirin  81 mg Oral Daily     atorvastatin  40 mg Oral QPM     donepezil  5 mg Oral At Bedtime     insulin aspart  1-7 Units Subcutaneous TID AC     insulin aspart  1-5 Units Subcutaneous At Bedtime     levETIRAcetam  500 mg Oral BID     memantine  5 mg Oral Daily     multivitamin w/minerals  1 tablet Oral Daily     pantoprazole  40 mg Oral BID

## 2019-02-24 NOTE — PLAN OF CARE
Discharge Planner PT   Patient plan for discharge: wants to D/C home.  Current status:  Alert but confused. Increased response time noted for motor commands. Supine to sit with min A, HOB elevated, use of bed rail. Sit to/from stand with min A; cues needed for safe hand placement. Ambulates 40' with FWW and CGA to mod A. Pt demonstrating scissoring gait, lateral trunk leans and general unsteadiness. Pt participates in seated/standing LE exercises to promote improved coordination including tapping to targets.   Barriers to return to prior living situation: Current level of A, decreased balance, decreased tolerance to activity, safety/cognition  Recommendations for discharge: ARU   Rationale for recommendations: Patient previously independent at baseline and now requiring increased level of A for all mobility. Patient would benefit from continued intensive skilled therapy to further improve strength and balance deficits as well as address functional limitations to decrease risk of falls and improve overall mobility and ambulation prior to safe discharge home.        Entered by: Eboni Denton 02/24/2019 9:32 AM

## 2019-02-24 NOTE — PLAN OF CARE
Discharge Planner OT   Patient plan for discharge: ARU  Current status: Pt. able to move from supine to sit with SBA and sit to stand with CGA and verbal cues for balance and hand placement.  Pt. noted to loss balance slightly with standing.  Pt. able to turn and  comb and place in pocket.  Pt. SBA with walking from bed to bathroom and SBA to CGA with standing at mirror. Pt. unaware of balance deficit while standing and using hands to reach and comb hair.  Pt. observed to lean on wall with elbow.  Pt. ambulated back to bed with SBA to CGA.  Pt. sat at EOB with SBA-CGA to doff/don socks.  OT provided verbal cues for balance and bringing LE up to knees instead of leaning forward due to decreased balance and concerns for falling forward.  Pt. able to sit and bring foot to knee and complete socks with SBA.  Pt. hopeful for weaning from FWW but unaware of balance deficits at this time.  Barriers to return to prior living situation: Current level of assist for ADLs/transfers, decreased strength/functional activity tolerance  Recommendations for discharge: ARU  Rationale for recommendations: Pt would benefit from ongoing skilled OT to maximize safety and indep in all ADLs/IADLs and mobility tasks. Pt is motivated to return to [facility], has strong family support. Pt reports facility could increase services if needed.         Entered by: Ruth Castellon 02/24/2019 3:21 PM

## 2019-02-25 ENCOUNTER — APPOINTMENT (OUTPATIENT)
Dept: OCCUPATIONAL THERAPY | Facility: CLINIC | Age: 79
DRG: 383 | End: 2019-02-25
Payer: MEDICARE

## 2019-02-25 ENCOUNTER — APPOINTMENT (OUTPATIENT)
Dept: PHYSICAL THERAPY | Facility: CLINIC | Age: 79
DRG: 383 | End: 2019-02-25
Payer: MEDICARE

## 2019-02-25 LAB
GLUCOSE BLDC GLUCOMTR-MCNC: 103 MG/DL (ref 70–99)
GLUCOSE BLDC GLUCOMTR-MCNC: 104 MG/DL (ref 70–99)
GLUCOSE BLDC GLUCOMTR-MCNC: 110 MG/DL (ref 70–99)
GLUCOSE BLDC GLUCOMTR-MCNC: 201 MG/DL (ref 70–99)
H PYLORI AG STL QL IA: NORMAL
SPECIMEN SOURCE: NORMAL

## 2019-02-25 PROCEDURE — 97535 SELF CARE MNGMENT TRAINING: CPT | Mod: GO | Performed by: OCCUPATIONAL THERAPIST

## 2019-02-25 PROCEDURE — A9270 NON-COVERED ITEM OR SERVICE: HCPCS | Mod: GY | Performed by: INTERNAL MEDICINE

## 2019-02-25 PROCEDURE — 12000000 ZZH R&B MED SURG/OB

## 2019-02-25 PROCEDURE — 25000132 ZZH RX MED GY IP 250 OP 250 PS 637: Mod: GY | Performed by: INTERNAL MEDICINE

## 2019-02-25 PROCEDURE — 97530 THERAPEUTIC ACTIVITIES: CPT | Mod: GP | Performed by: PHYSICAL THERAPY ASSISTANT

## 2019-02-25 PROCEDURE — A9270 NON-COVERED ITEM OR SERVICE: HCPCS | Mod: GY | Performed by: PSYCHIATRY & NEUROLOGY

## 2019-02-25 PROCEDURE — 00000146 ZZHCL STATISTIC GLUCOSE BY METER IP

## 2019-02-25 PROCEDURE — 99239 HOSP IP/OBS DSCHRG MGMT >30: CPT | Performed by: INTERNAL MEDICINE

## 2019-02-25 PROCEDURE — 97116 GAIT TRAINING THERAPY: CPT | Mod: GP | Performed by: PHYSICAL THERAPY ASSISTANT

## 2019-02-25 PROCEDURE — 25000132 ZZH RX MED GY IP 250 OP 250 PS 637: Mod: GY | Performed by: PSYCHIATRY & NEUROLOGY

## 2019-02-25 RX ORDER — LEVETIRACETAM 500 MG/1
500 TABLET ORAL 2 TIMES DAILY
Qty: 60 TABLET | Refills: 0 | DISCHARGE
Start: 2019-02-25 | End: 2019-03-27

## 2019-02-25 RX ORDER — ATORVASTATIN CALCIUM 40 MG/1
40 TABLET, FILM COATED ORAL EVERY EVENING
Qty: 30 TABLET | Refills: 0 | DISCHARGE
Start: 2019-02-25 | End: 2019-03-27

## 2019-02-25 RX ORDER — AMLODIPINE BESYLATE 2.5 MG/1
2.5 TABLET ORAL DAILY
Qty: 30 TABLET | Refills: 0 | DISCHARGE
Start: 2019-02-25 | End: 2019-03-27

## 2019-02-25 RX ORDER — MEMANTINE HYDROCHLORIDE 5 MG/1
5 TABLET ORAL DAILY
Qty: 30 TABLET | Refills: 0 | DISCHARGE
Start: 2019-02-25 | End: 2019-03-27

## 2019-02-25 RX ORDER — PANTOPRAZOLE SODIUM 40 MG/1
40 TABLET, DELAYED RELEASE ORAL 2 TIMES DAILY
Qty: 60 TABLET | Refills: 0 | DISCHARGE
Start: 2019-02-25 | End: 2019-03-27

## 2019-02-25 RX ADMIN — LEVETIRACETAM 500 MG: 500 TABLET, FILM COATED ORAL at 21:26

## 2019-02-25 RX ADMIN — MULTIPLE VITAMINS W/ MINERALS TAB 1 TABLET: TAB at 08:13

## 2019-02-25 RX ADMIN — ATORVASTATIN CALCIUM 40 MG: 40 TABLET, FILM COATED ORAL at 21:26

## 2019-02-25 RX ADMIN — MEMANTINE 5 MG: 5 TABLET ORAL at 08:13

## 2019-02-25 RX ADMIN — AMLODIPINE BESYLATE 2.5 MG: 2.5 TABLET ORAL at 08:13

## 2019-02-25 RX ADMIN — LEVETIRACETAM 500 MG: 500 TABLET, FILM COATED ORAL at 08:13

## 2019-02-25 RX ADMIN — PANTOPRAZOLE SODIUM 40 MG: 40 TABLET, DELAYED RELEASE ORAL at 21:27

## 2019-02-25 RX ADMIN — APIXABAN 5 MG: 5 TABLET, FILM COATED ORAL at 21:26

## 2019-02-25 RX ADMIN — PANTOPRAZOLE SODIUM 40 MG: 40 TABLET, DELAYED RELEASE ORAL at 08:13

## 2019-02-25 RX ADMIN — APIXABAN 5 MG: 5 TABLET, FILM COATED ORAL at 08:13

## 2019-02-25 ASSESSMENT — ACTIVITIES OF DAILY LIVING (ADL)
ADLS_ACUITY_SCORE: 16

## 2019-02-25 ASSESSMENT — MIFFLIN-ST. JEOR: SCORE: 1177.57

## 2019-02-25 NOTE — PLAN OF CARE
Pt. Alert, forgetful at times, up with assist of 1, gait belt and walker, Tele: A.Fib with BBB, no pauses reported 1900 to 2330. Lung sounds clear, room air sat's at 96%. HS ATT=407. Pt. On Eliquis for A.fib and recent CVA. Pt. On Keppra for recent seizures, no seizure activity this shift. Pt. Denies any needs at this time. Will continue with POC.

## 2019-02-25 NOTE — PROGRESS NOTES
Met with pt at bedside to discuss discharge plan for TCU rather than ARU. Explained plan at length with pt, pt states she understands. Daughter updated. Will cont to follow for TCU on discharge.    Kaelyn Stephens RN CTS

## 2019-02-25 NOTE — ANESTHESIA POSTPROCEDURE EVALUATION
Patient: Rhonda Mathur    Procedure(s):  ESOPHAGOSCOPY, GASTROSCOPY, DUODENOSCOPY    Diagnosis:Epigastric pain  Diagnosis Additional Information:  Epigastric pain, bleeding gastric ulcers          Anesthesia Type:  MAC    Note:  Anesthesia Post Evaluation    Patient location during evaluation: PACU  Patient participation: Able to fully participate in evaluation  Level of consciousness: awake  Pain management: adequate  Airway patency: patent  Cardiovascular status: acceptable  Respiratory status: acceptable  Hydration status: acceptable  PONV: controlled     Anesthetic complications: None    Comments: Late entry RO        Last vitals:  Vitals:    02/24/19 2031 02/24/19 2318 02/25/19 0809   BP: 154/60 149/51 129/63   Pulse:      Resp: 16 16 18   Temp: 96.5  F (35.8  C) 97.5  F (36.4  C) 97.8  F (36.6  C)   SpO2: 96% 97% 96%         Electronically Signed By: Robert King DO  February 25, 2019  9:06 AM

## 2019-02-25 NOTE — PLAN OF CARE
Discharge Planner OT   Patient plan for discharge: TCU  Current status: pt needs CGA with ambulation with FWW, completed toilet transfer with mod VC's and CGA. Stood for grooming at sink with SBA> ambulated in craven ~40ft with SBA and very slow pace. Pt is making daily progress and motivated to get back to home  Barriers to return to prior living situation: decreased activity tolerance  Recommendations for discharge: TCU, ARU declined patient  Rationale for recommendations: pt is motivated to get back to her home. Would benefit from daily therapy until back to baseline       Entered by: Joyce Sainz 02/25/2019 1:19 PM

## 2019-02-25 NOTE — DISCHARGE SUMMARY
[facility]  Hospitalist Discharge Summary       Date of Admission:  2/11/2019  Date of Discharge:  2/25/2019  Discharging Provider: Verónica Claros MD      Discharge Diagnoses     1. Acute encephalopathy due to complex partial seizures.    2. 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    3. Abdominal pain due to gastric ulcers.    4. A fib with sinus pauses, asymptomatic.    5. 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    Follow-ups Needed After Discharge       Unresulted Labs Ordered in the Past 30 Days of this Admission     Date and Time Order Name Status Description    2/14/2019 1409 H Pylori antigen stool In process       These results will be followed up by PCP.    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     Prior to this admission she was living in independent living in the same care center that her  was living in a memory care unit.  Over the past couple of years she has also had some declining memory particularly with short-term memory but has otherwise been able to manage her affairs reasonably well although needed help with complex tasks such as managing finances.  Furthermore she was ambulatory with a walker but had been off balance for the past 1-2 years but that had also declined over the past couple of months.  She was still ambulating but often stop in the middle of her path to reorient herself.  She does have known peripheral neuropathy and complained of a sense of imbalance like she was going to fall frequently.     In any case her primary plant on presentation was abdominal pain was ultimately discovered that  she had 2 nonbleeding gastric ulcers.  Her abdominal pain has subsequently resolved with appropriate antacid therapy.     After after her endoscopy she had altered mental status and ultimately getting an MRI that showed right hemispheric embolic strokes.  It should be noted that she was off her apixaban for the 3 and half days prior to this procedure.     She has had an RRT 2 days later on 2/18/2018 that look like she was having episodes of unresponsiveness concerning for further strokes.  Repeat MRI actually showed some improvement in the prior noted right hemispheric strokes.     Since her endoscopy she has had evidence of delirium in the setting of suspected progressive mild to moderate dementia at home.  She has had episodes of staring off into space that would last minutes prompting an EEG which was consistent with encephalopathy but did not reveal any epileptiform activity.  It is not clear that the EEG was completed during a time of 1 of these episodes.     She remains pleasant, and ambulatory status has been significantly impaired     Problem List:   1. Abdominal pain: Primary reason for presenting to our emergency room.  Looks most consistent with chronic gastric ulcers and substantially improved on twice daily PPI  2. Acute stroke: Looks embolic through her right hemisphere.  Her neuro exam to me is diffuse weakness and if anything she seems more weak on the right.  Not sure that the lack of her apixaban during the early part of her hospitalization in combination with her procedure led to her stroke although that certainly could have I feel like more likely scenario is that this is probably due to plaque rupture.  Nonetheless appreciate telemetry stroke input-she should have overlap of apixaban and baby aspirin for 7 days and then just apixaban alone.  Fasting lipid panel here shows a total cholesterol of 151, HDL of 35 and LDL of 78 -this is on her prior to admission atorvastatin at 20 mg a day.  That has  been continued at this time.  I think would be reasonable to increase this to 40 based on my concern for plaque rupture and goal LDL would be less than 70.  She should have repeat LFT and FLP testing in 4-6 weeks all check some baseline LFTs in the morning  3. 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  She has had some short-term memory loss over the past couple of years that seems to have worsened over the past several months.  She is repetitive in her questions and cannot remember certain things like dates.  Is also lost the ability to balance her checkbook and so they have really stepped in to manage that for her.  She still is living independently and has been functional in regards to this although likely require additional services at discharge.  Per neuro they initiated donepezil at 5 mg a day as well as memantine at 5 mg a day.  I doubt that we will have much impact during this hospitalization  4. Acute encephalopathy likely due to complex partial seizure activity. EEG 2/22 confirms this. Started on PO keppra.  5. Hypertension: on norvasc.  6. Atrial fibrillation: On chronic anticoagulation with apixaban. Having multiple pauses, however, asymptomatic.   7. ACKD stage II-III-typical creatinines are in the 1-1.2 range-stable throughout her hospitalization  8. Type 2 diabetes: Really note requiring insulin here. Monitor at ARU       No chest pain/SOB/N/V/dizziness/lightheadedness.    Consultations This Hospital Stay   CARE COORDINATOR IP CONSULT  GASTROENTEROLOGY IP CONSULT  CARDIOLOGY IP CONSULT  PHYSICAL THERAPY ADULT IP CONSULT  OCCUPATIONAL THERAPY ADULT IP CONSULT  SOCIAL WORK IP CONSULT  SWALLOW EVAL SPEECH PATH AT BEDSIDE IP CONSULT  NEUROLOGY IP CONSULT  NEUROLOGY IP CONSULT  CARE TRANSITION RN/SW IP CONSULT  PHYSICAL THERAPY ADULT IP CONSULT  OCCUPATIONAL THERAPY ADULT IP CONSULT  SMOKING CESSATION PROGRAM IP CONSULT  SOCIAL WORK IP  CONSULT  SOCIAL WORK IP CONSULT  PHARMACY TO DOSE HEPARIN  NEUROLOGY IP CONSULT  SOCIAL WORK IP CONSULT  PHYSICAL THERAPY ADULT IP CONSULT  OCCUPATIONAL THERAPY ADULT IP CONSULT    Code Status   DNR/DNI    Time Spent on this Encounter   I, Verónica Claros, personally saw the patient today and spent greater than 30 minutes discharging this patient.       Verónica Claros MD  [facility]  ______________________________________________________________________    Physical Exam   Vital Signs: Temp: 97.5  F (36.4  C) Temp src: Oral BP: 149/51   Heart Rate: 56 Resp: 16 SpO2: 97 % O2 Device: None (Room air)    Weight: 153 lbs 6.4 oz    Gen - alert, awake, slow to respond to Q's and commands. Oriented to self, knows its Feb, but thinks its 2018.  Lungs - CTA B.  Heart - RR,S1+S2 nml, no m/g/r.  Abd - soft, NT, ND, + BS.  Ext - no edema, MONTILLA's with some RLE weakness.             Primary Care Physician   iDego Taveras    Discharge Disposition   Discharged to rehabilitation facility  Condition at discharge: Stable    Significant Results and Procedures   Results for orders placed or performed during the hospital encounter of 02/11/19   Abd/pelvis CT,  IV  contrast only TRAUMA / AAA    Narrative    CT ABDOMEN AND PELVIS WITH CONTRAST   2/11/2019 7:45 PM     HISTORY: Epigastric abdominal pain radiating to back.    TECHNIQUE:  CT abdomen and pelvis with 81 mL Isovue-370 IV. Radiation  dose for this scan was reduced using automated exposure control,  adjustment of the mA and/or kV according to patient size, or iterative  reconstruction technique.    COMPARISON: None.    FINDINGS:  Abdomen: The lung bases are unremarkable. The heart is enlarged. The  gallbladder is absent. The liver, spleen, pancreas, adrenal glands are  normal in appearance. There are several small low-attenuation cortical  lesions in the kidneys bilaterally which are likely cysts. There is  mild right renal atrophy when compared to the left. No  hydronephrosis.  No abdominal or pelvic lymph node enlargement. There is  atherosclerotic calcification of aorta and its branches. No aneurysm.    Pelvis: There is no bowel obstruction or inflammation. No free  intraperitoneal gas or fluid. Uterus and adnexa appear grossly normal.  There are postoperative changes in the anterior abdominal wall.  Degenerative disease in the spine.      Impression    IMPRESSION:  1. No acute abnormality. No bowel obstruction or inflammation.  2. Cardiomegaly.    MEERA DAMIAN MD   CT Head w/o Contrast    Narrative    CT SCAN OF THE HEAD WITHOUT CONTRAST   2/15/2019 10:50 AM     HISTORY: Altered level of consciousness (LOC), unexplained    TECHNIQUE:  Axial images of the head and coronal reformations without  IV contrast material. Radiation dose for this scan was reduced using  automated exposure control, adjustment of the mA and/or kV according  to patient size, or iterative reconstruction technique.    COMPARISON: None.    FINDINGS:  There is diffuse parenchymal volume loss.  White matter  changes are present in the cerebral hemispheres that are consistent  with small vessel ischemic disease in this age patient. There are  small calcifications in the subarachnoid space over the right  convexity and in the right insular region. These are nonspecific but  they could be due to thromboembolic disease in the right middle  cerebral artery distribution. No areas of acute or chronic infarction  are identified. No hemorrhage. No mass effect The visualized portions  of the sinuses and mastoids appear normal. There is no evidence of  trauma.      Impression    IMPRESSION:   1. No acute pathology is identified. No acute infarcts are seen. No  bleed.  2. Small calcifications in the subarachnoid space over the right  hemisphere and in the right insular region. These are nonspecific but  they could be due to thromboembolic disease of uncertain chronicity.      IVONNE JUAREZ MD   XR Chest Port 1 View     Narrative    XR CHEST PORT 1 VW  2/16/2019 4:37 AM     HISTORY: Mild hypoxia now with encephalopathy. Evaluate for occult  infiltrate.    COMPARISON: None.    FINDINGS: Semiupright portable chest. The heart is enlarged. There is  no pulmonary edema. The thoracic aorta is calcified. The lungs are  clear. No pneumothorax.      Impression    IMPRESSION: No acute abnormality.    MEERA DAMIAN MD   MR Brain w/o & w Contrast    Narrative    MRI BRAIN WITHOUT AND WITH CONTRAST  2/16/2019 11:20 AM    HISTORY:  Altered level of consciousness (LOC), unexplained     TECHNIQUE:  Multiplanar, multisequence MRI of the brain without and  with 6.5mL Gadavist    COMPARISON: CT scan dated 2/15/2018    FINDINGS:  There is diffuse parenchymal volume loss.  White matter  changes are present in the cerebral hemispheres that are consistent  with small vessel ischemic disease in this age patient. There are  multiple small areas of restricted diffusion in the right hemisphere  including lesions in the right frontal lobe and right parietal lobe.  There is also a small area of restricted diffusion in the right  periventricular white matter. These multiple small areas of restricted  diffusion are consistent with an embolic process in the right middle  cerebral artery distribution.  There is no evidence of hemorrhage,  mass, acute infarct, or anomaly.  There are no gadolinium enhancing  lesions.   The facial structures appear normal. The arteries at the  base of the brain and the dural venous sinuses appear patent.       Impression    IMPRESSION:  Multiple small areas of restricted diffusion in the right  hemisphere. These are consistent with recent infarcts. There are  compatible with an embolic process primarily in the right middle  cerebral artery distribution.      IVONNE JUAREZ MD   CTA Head Neck with Contrast    Narrative    CT ANGIOGRAM OF THE HEAD AND NECK WITH CONTRAST  2/16/2019 4:03 PM     HISTORY: Focal neuro deficit, greater than  6 hours, stroke suspected.     TECHNIQUE:  CT angiography with an injection of 70mL Isovue-370 IV  with scans through the head and neck. Images were transferred to a  separate 3-D workstation where multiplanar reformations and 3-D images  were created. Estimates of carotid stenoses are made relative to the  distal internal carotid artery diameters except as noted. Radiation  dose for this scan was reduced using automated exposure control,  adjustment of the mA and/or kV according to patient size, or iterative  reconstruction technique.    COMPARISON: Brain MR from earlier the same day.     CT HEAD FINDINGS: Cortical infarcts seen on brain MRI are not  appreciated by CT. No definite abnormal enhancing intracranial  lesions.    CT ANGIOGRAM HEAD FINDINGS:  The proximal major intracranial arteries  appear patent without definite vascular cutoff. There are at least two  separate areas of dense calcification of the right M3 branches in the  Sylvian fissure (series 6 image [phone] and [phone]). The vessels after these  areas of calcification appear patent. No definite cutoff of the  proximal right MCA branches to the limits of CTA imaging.    There is calcified atherosclerotic disease of the cavernous and  supraclinoid carotid arteries resulting in moderate stenosis on the  right and mild stenosis on the left. No intracranial aneurysm is  appreciated.    CT ANGIOGRAM NECK FINDINGS:   Extensive atherosclerotic calcification at the aortic arch and origins  of the great vessels. There is mild to moderate stenosis at the origin  of the great vessels from the aortic arch due to the calcified  atherosclerotic plaque.    Right carotid artery: There are surgical clips around the right  carotid bifurcation and proximal internal carotid artery likely due to  previous endarterectomy. No significant stenosis at the carotid  bifurcation. There is scattered atherosclerotic plaque in the common  carotid artery and in the internal carotid artery  without significant  stenosis.    Left carotid artery: The left common and internal carotid arteries are  patent. Scattered calcification throughout the left common carotid  artery, carotid bifurcation, and proximal internal carotid artery.  This results in approximately 30% stenosis by NASCET criteria.     Vertebral arteries: The left vertebral artery is patent throughout its  course without evidence of dissection.    The right vertebral artery is patent at its origin. There is gradual  decreased enhancement throughout the right vertebral artery as it  extends up the neck. The distal V2 and V3 segments are only minimally  opacified. There is also decreased opacification of the intracranial  V4 segment. This could represent a dissection.    Other findings: Heterogeneous right-sided thyroid gland nodule  measuring up to 1.8 cm.       Impression    IMPRESSION:   1. Two separate dense areas of calcification in the M3 branches of the  right MCA. The vessels immediately distal to these calcifications  appear grossly patent. These could represent intracranial  atherosclerotic disease or calcified emboli. No definite vascular  cutoff of the proximal intracranial arteries within the limits of CTA.  No intracranial aneurysm.  2. Calcified atherosclerotic disease at the cavernous and supraclinoid  carotid arteries results in moderate stenosis on the right and mild  stenosis on the left.  3. The right vertebral artery is patent at its origin but demonstrates  gradual loss of opacification throughout the neck and is almost  completely nonopacified in its distal V2 and V3 segments. The  intracranial V4 segment is also poorly opacified. This is concerning  for dissection which could be acute or chronic.  4. Marked calcified atherosclerotic disease at the aortic arch  resulting in mild to moderate stenosis at the origins of the great  vessels.  5. Atherosclerotic disease in the left carotid artery results in  approximately 30%  stenosis by NASCET criteria.  6. Sequela of previous carotid endarterectomy of the right carotid  artery without significant residual stenosis.  7. Heterogeneous right thyroid gland nodule measuring up to 1.8 cm.  Consider further evaluation with thyroid ultrasound.      SHELLY HUFFMAN MD   CT Head w/o Contrast    Narrative    CT SCAN OF THE HEAD WITHOUT CONTRAST   2/18/2019 9:52 AM     HISTORY: Stroke, follow up; recent right middle cerebral artery  territory infarcts with newly depressed mental status. History of  atrial fibrillation.    TECHNIQUE:  Axial images of the head and coronal reformations without  IV contrast material. Radiation dose for this scan was reduced using  automated exposure control, adjustment of the mA and/or kV according  to patient size, or iterative reconstruction technique.    COMPARISON: CT head 2/15/2019 and CT angiogram and MRI 2/16/2019.    FINDINGS:  There is generalized atrophy of the brain.  There is low  attenuation in the white matter of the cerebral hemispheres consistent  with sequelae of small vessel ischemic disease. Scattered  calcifications are seen in the right middle cerebral artery territory  in the subarachnoid space indicating embolized calcified  atherosclerotic plaque. These are unchanged compared with the recent  previous noncontrast head CT scan. There is no evidence of  intracranial hemorrhage, mass, acute infarct or anomaly.     The visualized portions of the sinuses and mastoids appear normal.  There is no evidence of trauma.      Impression    IMPRESSION:   1. The tiny infarcts seen on the recent MRI are not visible on this CT  scan.  2. No change in multiple calcifications consistent with embolized  calcified atherosclerotic plaque in the right middle cerebral artery  territory. Age is indeterminate.  3. Atrophy of the brain.  White matter changes consistent with  sequelae of small vessel ischemic disease. This is unchanged.    STEVE NUR MD   MR Brain w/o  Contrast    Narrative    MRI BRAIN WITHOUT CONTRAST February 18, 2019 12:25 PM    HISTORY: Stroke, follow up; worsening mental status.    TECHNIQUE: Multiplanar, multisequence MRI of the brain without  gadolinium IV contrast material.      COMPARISON: CT angiogram 2/16/2019, CT head this morning, and MRI  brain 2/16/2019.    FINDINGS: There is generalized atrophy of the brain. White matter  changes are seen in the cerebral hemispheres consistent with sequelae  of small vessel ischemic disease. The recent cortical infarcts in the  lateral aspect of the right frontal and parietal lobes and to a lesser  extent the deep white matter adjacent to the body of the right lateral  ventricle have become much less apparent, and several of the tiny  infarcts are no longer visible. The main ones that  remain visible are  in the right parietal lobe. A few tiny foci of limited diffusion in  the left post central gyrus and the subarachnoid space in the left  posteromedial parietal lobe are also visible, with the infarct no  longer visible on the subarachnoid space lesion still present. These  suggest that the shower of emboli that were present on the right also  slightly affected the left parietal lobe. No new infarcts are visible.  There is no evidence of hemorrhage or mass. Old lacunar infarcts are  seen in the cerebellar hemispheres, unchanged.    The facial structures appear normal. The arteries at the base of the  brain and the dural venous sinuses appear patent.       Impression    IMPRESSION:    1. Decrease in the conspicuity of the previously identified infarcts  in the cerebral hemispheres, mainly on the right but also in the left  postcentral gyrus.  2. Signal abnormalities related to embolized atheromatous are present  bilaterally but more prominent on the right and are unchanged.  3. Brain atrophy and white matter changes consistent with sequelae of  small vessel ischemic disease, unchanged.  4. Old lacunar infarcts in  "the cerebellar hemispheres, unchanged.    STEVE NUR MD   CT Head w/o Contrast    Narrative    CT SCAN OF THE HEAD WITHOUT CONTRAST   2/19/2019 10:41 AM     HISTORY: \"Code Stroke\".    TECHNIQUE:  Axial images of the head and coronal reformations without  IV contrast material. Radiation dose for this scan was reduced using  automated exposure control, adjustment of the mA and/or kV according  to patient size, or iterative reconstruction technique.    COMPARISON: Brain MR 2/18/2019. Head CT 2/18/2019.    FINDINGS: No evidence of acute intracranial hemorrhage. No mass effect  or midline shift. There is mild patchy periventricular white matter  hypodensities that are nonspecific but likely related to chronic  microvascular ischemic disease. Ventricular size is within normal  limits without evidence of hydrocephalus. Scattered areas of  subarachnoid calcifications in the right cerebral hemisphere are  unchanged.    The visualized portions of the sinuses and mastoids appear normal. The  bony calvarium and bones of the skull base appear intact.       Impression    IMPRESSION:     1. No evidence of acute intracranial hemorrhage, mass, or herniation.  2. No significant change of multiple calcifications throughout the  right cerebral hemisphere representing age-indeterminate calcified  emboli in the right middle cerebral artery territory.    SHELLY HUFFMAN MD   CTA Head Neck with Contrast    Narrative    CT ANGIOGRAM OF THE HEAD AND NECK WITH CONTRAST  CT HEAD PERFUSION WITH CONTRAST  2/19/2019 10:46 AM     HISTORY: \"Code Stroke\"     TECHNIQUE:  CT angiography with an injection of 70mL Isovue-370  (accession [id number]), 50mL Isovue-370 (accession [id number]) IV with  scans through the head and neck. Images were transferred to a separate  3-D workstation where multiplanar reformations and 3-D images were  created. Estimates of carotid stenoses are made relative to the distal  internal carotid artery diameters except as " noted. Radiation dose for  this scan was reduced using automated exposure control, adjustment of  the mA and/or kV according to patient size, or iterative  reconstruction technique.     Perfusion scans were performed at three levels with injection of  additional IV nonionic contrast and saline flush.  These images were  processed on a separate 3-D workstation.     COMPARISON: CTA head and neck 2/16/2019.     CT HEAD FINDINGS: No contrast enhancing lesions. CT perfusion images  demonstrates subtle increase in time to drain as well as T-max in the  right MCA territory. No obvious decreased cerebral blood volume or  cerebral blood flow.     CT ANGIOGRAM HEAD FINDINGS: No definite vascular cutoff of the  proximal major intracranial arteries. Again seen are two separate  dense areas of calcification of the M3 branches of the right MCA in  the sylvian fissure. The M3 branches distal to these calcifications  are again patent but may be slightly small in size. No other definite  vascular cutoff is appreciated, although the more distal right M3 and  M4 branches are at or below the spatial resolution of CTA.    There is calcified atherosclerotic disease of the cavernous and  supraclinoid carotid arteries resulting in moderate stenosis on the  right and mild stenosis on the left. No aneurysm is appreciated.    CT ANGIOGRAM NECK FINDINGS:   Marked calcified atherosclerotic disease at the aortic arch and  origins of the great vessels. There is mild-to-moderate stenosis of  the origins of the great vessels from the aortic arch.    Right carotid artery: The right common and internal carotid arteries  are patent. Sequela of previous right carotid endarterectomy with  multiple surgical clips around the right carotid artery. Scattered  soft and calcified plaque in the right common carotid artery and  proximal internal carotid artery. No significant stenosis. The right  carotid bifurcation is widely patent.     Left carotid artery:  The left common and internal carotid arteries are  patent. Scattered calcification throughout the carotid artery and the  common carotid artery, carotid bifurcation, and proximal internal  carotid artery. This results in approximately 59% stenosis by NASCET  criteria.     Vertebral arteries: The left vertebral artery is patent throughout its  course without significant stenosis. The right vertebral artery is  patent at its origin. Right vertebral artery is smaller than the left.  There is gradual decrease of opacification of the distal V2 and V3  segments of the vertebral artery, although the opacification is  improved compared to prior CTA. This could represent an  age-indeterminate dissection.    Other findings: Heterogeneous 1.7 cm right thyroid gland nodule.       Impression    IMPRESSION:   1. No definite vascular cutoff of the proximal major intracranial  arteries. Dense calcifications within the right M3 branches are again  seen likely due to age-indeterminate calcified emboli or calcified  atherosclerotic disease.  2. CT perfusion images demonstrate subtle abnormality in the right MCA  territory which could represent ischemia. The previously visualized  cortical infarcts on brain MRI are not appreciated.  3. Again seen is decreased opacification of the distal right vertebral  artery in its V2 and V3 segments, possibly due to dissection although  the opacification is improved since prior CTA. The improvement in  opacification could be due to differences in timing of the CTA versus  improved flow in the vertebral artery.  4. Marked calcified atherosclerotic disease at the aortic arch causing  mild-to-moderate stenosis at the origins of the great vessels.  5. Atherosclerotic disease in the carotid arteries bilaterally. There  is approximately 59% stenosis of the left carotid artery by NASCET  criteria. Sequela of previous right carotid endarterectomy without  significant residual stenosis.  6. Heterogeneous 1.7  "cm right thyroid gland nodule. Recommend further  evaluation with thyroid ultrasound on a nonemergent basis.    Results discussed with nurse Jessica Dillon at 11:07 AM on 2/19/2019.     SHELLY HUFFMAN MD   CT Head Perfusion w Contrast    Narrative    CT ANGIOGRAM OF THE HEAD AND NECK WITH CONTRAST  CT HEAD PERFUSION WITH CONTRAST  2/19/2019 10:46 AM     HISTORY: \"Code Stroke\"     TECHNIQUE:  CT angiography with an injection of 70mL Isovue-370  (accession [id number]), 50mL Isovue-370 (accession [id number]) IV with  scans through the head and neck. Images were transferred to a separate  3-D workstation where multiplanar reformations and 3-D images were  created. Estimates of carotid stenoses are made relative to the distal  internal carotid artery diameters except as noted. Radiation dose for  this scan was reduced using automated exposure control, adjustment of  the mA and/or kV according to patient size, or iterative  reconstruction technique.     Perfusion scans were performed at three levels with injection of  additional IV nonionic contrast and saline flush.  These images were  processed on a separate 3-D workstation.     COMPARISON: CTA head and neck 2/16/2019.     CT HEAD FINDINGS: No contrast enhancing lesions. CT perfusion images  demonstrates subtle increase in time to drain as well as T-max in the  right MCA territory. No obvious decreased cerebral blood volume or  cerebral blood flow.     CT ANGIOGRAM HEAD FINDINGS: No definite vascular cutoff of the  proximal major intracranial arteries. Again seen are two separate  dense areas of calcification of the M3 branches of the right MCA in  the sylvian fissure. The M3 branches distal to these calcifications  are again patent but may be slightly small in size. No other definite  vascular cutoff is appreciated, although the more distal right M3 and  M4 branches are at or below the spatial resolution of CTA.    There is calcified atherosclerotic disease of the " cavernous and  supraclinoid carotid arteries resulting in moderate stenosis on the  right and mild stenosis on the left. No aneurysm is appreciated.    CT ANGIOGRAM NECK FINDINGS:   Marked calcified atherosclerotic disease at the aortic arch and  origins of the great vessels. There is mild-to-moderate stenosis of  the origins of the great vessels from the aortic arch.    Right carotid artery: The right common and internal carotid arteries  are patent. Sequela of previous right carotid endarterectomy with  multiple surgical clips around the right carotid artery. Scattered  soft and calcified plaque in the right common carotid artery and  proximal internal carotid artery. No significant stenosis. The right  carotid bifurcation is widely patent.     Left carotid artery: The left common and internal carotid arteries are  patent. Scattered calcification throughout the carotid artery and the  common carotid artery, carotid bifurcation, and proximal internal  carotid artery. This results in approximately 59% stenosis by NASCET  criteria.     Vertebral arteries: The left vertebral artery is patent throughout its  course without significant stenosis. The right vertebral artery is  patent at its origin. Right vertebral artery is smaller than the left.  There is gradual decrease of opacification of the distal V2 and V3  segments of the vertebral artery, although the opacification is  improved compared to prior CTA. This could represent an  age-indeterminate dissection.    Other findings: Heterogeneous 1.7 cm right thyroid gland nodule.       Impression    IMPRESSION:   1. No definite vascular cutoff of the proximal major intracranial  arteries. Dense calcifications within the right M3 branches are again  seen likely due to age-indeterminate calcified emboli or calcified  atherosclerotic disease.  2. CT perfusion images demonstrate subtle abnormality in the right MCA  territory which could represent ischemia. The previously  visualized  cortical infarcts on brain MRI are not appreciated.  3. Again seen is decreased opacification of the distal right vertebral  artery in its V2 and V3 segments, possibly due to dissection although  the opacification is improved since prior CTA. The improvement in  opacification could be due to differences in timing of the CTA versus  improved flow in the vertebral artery.  4. Marked calcified atherosclerotic disease at the aortic arch causing  mild-to-moderate stenosis at the origins of the great vessels.  5. Atherosclerotic disease in the carotid arteries bilaterally. There  is approximately 59% stenosis of the left carotid artery by NASCET  criteria. Sequela of previous right carotid endarterectomy without  significant residual stenosis.  6. Heterogeneous 1.7 cm right thyroid gland nodule. Recommend further  evaluation with thyroid ultrasound on a nonemergent basis.    Results discussed with nurse Jessica Dillon at 11:07 AM on 2/19/2019.     SHELLY HUFFMAN MD   CT Head w/o Contrast    Narrative    CT SCAN OF THE HEAD WITHOUT CONTRAST  2/22/2019 1:34 AM     HISTORY: Altered level of consciousness, unexplained. Rule out  intracranial hemorrhage.    TECHNIQUE: Axial images of the head and coronal reformations without  IV contrast material. Radiation dose for this scan was reduced using  automated exposure control, adjustment of the mA and/or kV according  to patient size, or iterative reconstruction technique.    COMPARISON: 2/19/2019.    FINDINGS: There is generalized atrophy of the brain. There is low  attenuation in the white matter of the cerebral hemispheres consistent  with sequelae of small vessel ischemic disease. There is no evidence  of intracranial hemorrhage, mass, acute infarct or anomaly. No change  in multiple punctate calcifications in the right cerebral hemisphere.    The visualized portions of the sinuses and mastoids appear normal.  There is no evidence of trauma.      Impression     IMPRESSION: No acute abnormality.    MEERA DAMIAN MD       Discharge Orders      General info for SNF    Length of Stay Estimate: Short Term Care: Estimated # of Days <30  Condition at Discharge: Stable  Level of care:skilled   Rehabilitation Potential: Fair  Admission H&P remains valid and up-to-date: Yes  Recent Chemotherapy: N/A  Use Nursing Home Standing Orders: Yes     Mantoux instructions    Give two-step Mantoux (PPD) Per Facility Policy Yes     Glucose monitor nursing POCT    Before meals and at bedtime     Activity - Up with nursing assistance     DNR/DNI     Physical Therapy Adult Consult    Evaluate and treat as clinically indicated.    Reason:  weakness     Occupational Therapy Adult Consult    Evaluate and treat as clinically indicated.    Reason:  weakness     Fall precautions     Advance Diet as Tolerated    Follow this diet upon discharge: Orders Placed This Encounter      Room Service      Snacks/Supplements Adult: Other; ok to order prn; With Meals      Advance Diet as Tolerated: Regular Diet Adult     Discharge Medications   Current Discharge Medication List      START taking these medications    Details   levETIRAcetam (KEPPRA) 500 MG tablet Take 1 tablet (500 mg) by mouth 2 times daily  Qty: 60 tablet, Refills: 0    Associated Diagnoses: Seizure disorder (H)      memantine (NAMENDA) 5 MG tablet Take 1 tablet (5 mg) by mouth daily  Qty: 30 tablet, Refills: 0    Associated Diagnoses: Dementia without behavioral disturbance, unspecified dementia type      pantoprazole (PROTONIX) 40 MG EC tablet Take 1 tablet (40 mg) by mouth 2 times daily  Qty: 60 tablet, Refills: 0    Associated Diagnoses: Gastrointestinal hemorrhage associated with gastric ulcer         CONTINUE these medications which have CHANGED    Details   amLODIPine (NORVASC) 2.5 MG tablet Take 1 tablet (2.5 mg) by mouth daily  Qty: 30 tablet, Refills: 0    Associated Diagnoses: Essential hypertension      atorvastatin (LIPITOR) 40 MG  tablet Take 1 tablet (40 mg) by mouth every evening  Qty: 30 tablet, Refills: 0    Associated Diagnoses: Cerebrovascular accident (CVA), unspecified mechanism (H)         CONTINUE these medications which have NOT CHANGED    Details   acetaminophen (TYLENOL) 325 MG tablet Take 325-650 mg by mouth every 4 hours as needed for mild pain      albuterol (PROAIR HFA/PROVENTIL HFA/VENTOLIN HFA) 108 (90 Base) MCG/ACT inhaler Inhale 2-4 puffs into the lungs every 4 hours as needed for shortness of breath / dyspnea or wheezing      apixaban ANTICOAGULANT (ELIQUIS) 5 MG tablet Take 5 mg by mouth 2 times daily      aspirin 81 MG EC tablet Take 81 mg by mouth daily      nitroGLYcerin (NITROSTAT) 0.4 MG sublingual tablet Place 0.4 mg under the tongue every 5 minutes as needed for chest pain For chest pain place 1 tablet under the tongue every 5 minutes for 3 doses. If symptoms persist 5 minutes after 1st dose call 911.      vitamin B-12 (CYANOCOBALAMIN) 1000 MCG tablet Take 1,000 mcg by mouth daily         STOP taking these medications       bumetanide (BUMEX) 1 MG tablet Comments:   Reason for Stopping:         bumetanide (BUMEX) 1 MG tablet Comments:   Reason for Stopping:         carvedilol (COREG) 12.5 MG tablet Comments:   Reason for Stopping:         gabapentin (NEURONTIN) 600 MG tablet Comments:   Reason for Stopping:         lisinopril (PRINIVIL/ZESTRIL) 5 MG tablet Comments:   Reason for Stopping:         metFORMIN (GLUCOPHAGE) 500 MG tablet Comments:   Reason for Stopping:         omeprazole (PRILOSEC) 20 MG DR capsule Comments:   Reason for Stopping:         oxybutynin ER (DITROPAN XL) 15 MG 24 hr tablet Comments:   Reason for Stopping:         oxyCODONE-acetaminophen (PERCOCET) 5-325 MG tablet Comments:   Reason for Stopping:         potassium chloride ER (K-DUR/KLOR-CON M) 20 MEQ CR tablet Comments:   Reason for Stopping:             Allergies   No Known Allergies

## 2019-02-25 NOTE — PROGRESS NOTES
Care Coordination:  Call placed to [facility]. Spoke to Mamie [phone]. She requested additional PT/OT/MD notes to be faxed to fax # [phone]. Information faxed. Will await return call for bed placement/acceptance to ARU.    Kaelyn Stephens RN CTS    ADDENDUM:  CTS received call from Mamie in Admissions at [facility], they have received paperwork faxed this morning. They have declined pt for admission stating due to her dementia they feel she is more appropriate for TCU.

## 2019-02-25 NOTE — PROGRESS NOTES
Care Coordination:  Call placed to pt's daughter Fawn regarding discharge plan and update on [facility] declining pt admission. Spoke to her about TCU recommendation. She would like [facility] as her first choice for TCU. She would like a private room and understands the private pay costs associated. Referral sent to [facility].      Kaelyn Stephens RN CTS

## 2019-02-25 NOTE — PLAN OF CARE
Discharge Planner PT   Patient plan for discharge: wants to D/C home.  Current status:  PT - Pt amb 55' with ww with min assist with scissor gait pattern @ times.  Note pt to high step during ambulation.  Pt reports she see steps while she is walking.  Note pt to be slow to answer. Note pt to hesistate with placing foot down onto floor - pt required vcs for placing foot down onto floor. PT - Pt transfers supine to sit with SBA and supervision with scooting to EOB.  Pt transfers bed to chair with ww with min assist.  Pt transfers sit to/from stand with CGA with vcs for hand placement for safety.   Barriers to return to prior living situation: Current level of A, decreased balance, decreased tolerance to activity, safety/cognition  Recommendations for discharge: ARU   Rationale for recommendations: Patient previously independent at baseline and now requiring increased level of A for all mobility. Patient would benefit from continued intensive skilled therapy to further improve strength and balance deficits as well as address functional limitations to decrease risk of falls and improve overall mobility and ambulation prior to safe discharge home.        Entered by: Enedina Billy 02/25/2019 3:14 PM

## 2019-02-25 NOTE — PLAN OF CARE
Pt disoriented to time, remains confused at times. VSS. No seizure activity noted. Up assist one, ambulated in [place] this afternoon. Regular diet. Plan is to discharge to TCU, SW in contact with daughter.

## 2019-02-25 NOTE — PLAN OF CARE
A&O, forgetful. VSS. Denies pain. . Tele Afib with BBB, pauses up to 2.9  sec. Possible discharge today.  Maria De Jesus Altamirano RN on 2/25/2019 at 5:55 AM

## 2019-02-26 ENCOUNTER — APPOINTMENT (OUTPATIENT)
Dept: PHYSICAL THERAPY | Facility: CLINIC | Age: 79
DRG: 383 | End: 2019-02-26
Payer: MEDICARE

## 2019-02-26 ENCOUNTER — APPOINTMENT (OUTPATIENT)
Dept: SPEECH THERAPY | Facility: CLINIC | Age: 79
DRG: 383 | End: 2019-02-26
Payer: MEDICARE

## 2019-02-26 ENCOUNTER — APPOINTMENT (OUTPATIENT)
Dept: CARDIOLOGY | Facility: CLINIC | Age: 79
DRG: 383 | End: 2019-02-26
Attending: INTERNAL MEDICINE
Payer: MEDICARE

## 2019-02-26 ENCOUNTER — APPOINTMENT (OUTPATIENT)
Dept: OCCUPATIONAL THERAPY | Facility: CLINIC | Age: 79
DRG: 383 | End: 2019-02-26
Payer: MEDICARE

## 2019-02-26 VITALS
DIASTOLIC BLOOD PRESSURE: 48 MMHG | HEIGHT: 66 IN | RESPIRATION RATE: 20 BRPM | OXYGEN SATURATION: 98 % | HEART RATE: 58 BPM | SYSTOLIC BLOOD PRESSURE: 114 MMHG | WEIGHT: 154.8 LBS | TEMPERATURE: 96.4 F | BODY MASS INDEX: 24.88 KG/M2

## 2019-02-26 LAB
GLUCOSE BLDC GLUCOMTR-MCNC: 104 MG/DL (ref 70–99)
GLUCOSE BLDC GLUCOMTR-MCNC: 128 MG/DL (ref 70–99)
GLUCOSE BLDC GLUCOMTR-MCNC: 178 MG/DL (ref 70–99)

## 2019-02-26 PROCEDURE — A9270 NON-COVERED ITEM OR SERVICE: HCPCS | Mod: GY | Performed by: INTERNAL MEDICINE

## 2019-02-26 PROCEDURE — 92526 ORAL FUNCTION THERAPY: CPT | Mod: GN

## 2019-02-26 PROCEDURE — 00000146 ZZHCL STATISTIC GLUCOSE BY METER IP

## 2019-02-26 PROCEDURE — 97530 THERAPEUTIC ACTIVITIES: CPT | Mod: GP

## 2019-02-26 PROCEDURE — 0296T ZIO PATCH HOLTER ADULT PEDIATRIC GREATER THAN 48 HRS: CPT

## 2019-02-26 PROCEDURE — 25000132 ZZH RX MED GY IP 250 OP 250 PS 637: Mod: GY | Performed by: PSYCHIATRY & NEUROLOGY

## 2019-02-26 PROCEDURE — 25000132 ZZH RX MED GY IP 250 OP 250 PS 637: Mod: GY | Performed by: INTERNAL MEDICINE

## 2019-02-26 PROCEDURE — 97110 THERAPEUTIC EXERCISES: CPT | Mod: GP

## 2019-02-26 PROCEDURE — 97116 GAIT TRAINING THERAPY: CPT | Mod: GP

## 2019-02-26 PROCEDURE — A9270 NON-COVERED ITEM OR SERVICE: HCPCS | Mod: GY | Performed by: PSYCHIATRY & NEUROLOGY

## 2019-02-26 PROCEDURE — 97535 SELF CARE MNGMENT TRAINING: CPT | Mod: GO

## 2019-02-26 PROCEDURE — 0298T ZIO PATCH HOLTER ADULT PEDIATRIC GREATER THAN 48 HRS: CPT | Performed by: INTERNAL MEDICINE

## 2019-02-26 RX ORDER — ASPIRIN 81 MG/1
81 TABLET ORAL DAILY
DISCHARGE
Start: 2019-02-26

## 2019-02-26 RX ADMIN — LEVETIRACETAM 500 MG: 500 TABLET, FILM COATED ORAL at 09:27

## 2019-02-26 RX ADMIN — PANTOPRAZOLE SODIUM 40 MG: 40 TABLET, DELAYED RELEASE ORAL at 09:27

## 2019-02-26 RX ADMIN — MULTIPLE VITAMINS W/ MINERALS TAB 1 TABLET: TAB at 09:27

## 2019-02-26 RX ADMIN — AMLODIPINE BESYLATE 2.5 MG: 2.5 TABLET ORAL at 09:27

## 2019-02-26 RX ADMIN — MEMANTINE 5 MG: 5 TABLET ORAL at 09:27

## 2019-02-26 RX ADMIN — APIXABAN 5 MG: 5 TABLET, FILM COATED ORAL at 09:27

## 2019-02-26 ASSESSMENT — ACTIVITIES OF DAILY LIVING (ADL)
ADLS_ACUITY_SCORE: 16

## 2019-02-26 ASSESSMENT — MIFFLIN-ST. JEOR: SCORE: 1183.92

## 2019-02-26 NOTE — PROGRESS NOTES
Transition Communication Hand-off for Care Transitions to Next Level of Care Provider    Name: Rhonda Mathur  : 1940  MRN #: [id number]  Primary Care Provider: Diego Taveras  Primary Care MD Name: Darcy   Primary Clinic: [facility] [address] [facility] [address]  Primary Care Clinic Name: [facility]   Reason for Hospitalization:  Hypokalemia [E87.6]  Acute on chronic diastolic congestive heart failure (H) [I50.33]  Acute cerebrovascular accident (CVA) (H) [I63.9]  Admit Date/Time: 2019  6:35 PM  Discharge Date: 19  Payor Source: Payor: MEDICARE / Plan: MEDICARE / Product Type: Medicare /     Readmission Assessment Measure (LINA) Risk Score/category: Average          Reason for Communication Hand-off Referral: Admission diagnoses: CHF  Fragility  Difficulty understanding plan of care    Discharge Plan: [facility]       Concern for non-adherence with plan of care:   No  Discharge Needs Assessment:  Needs      Most Recent Value   Equipment Currently Used at Home  none   # of Referrals Placed by CTS  Post Acute Facilities   Transitional Care  -- [[facility]]          Already enrolled in Tele-monitoring program and name of program:  NA  Follow-up specialty is recommended: No    Follow-up plan:    Future Appointments   Date Time Provider Department Center   2019  8:00 AM Enedina Billy PTA [facility]       Any outstanding tests or procedures:    Procedures     Future Labs/Procedures    HC ZIO PATCH 48 HOURS APPLICATION     Scheduling Instructions:    For 5 days          Referrals     Future Labs/Procedures    Occupational Therapy Adult Consult     Comments:    Evaluate and treat as clinically indicated.    Reason:  weakness    Physical Therapy Adult Consult     Comments:    Evaluate and treat as clinically indicated.    Reason:  weakness            Key Recommendations:  CTS following assessing for needs in 79 year old pt with hx of  "afib/chf admitted with hypokalemia.     Pt reports that she lives independently in her own apartment at the same senior living facility as her  that is currently in Memory Care.  She spends a lot of her day if not all of her day visiting and caring for him, he lives right down the [place]. She did explain to me that she is understanding that she has CHF, she does not weigh her self daily \" I did at first but I was never gaining wt so I stopped\" and rarely uses salt.  She explains that she was supposed to follow up with her Cardiologist at [facility] \"in the Summer\" but never did.   Patient was admitted with an acute stroke and partial seizures. Neurology was consulted and following with patient. Patient has mild to moderate dementia. Neurology started keppra and other medications. It is recommended that patient have LFT labs checked at next appointment.     Please follow pt closely upon return home for on going assessment in the care of her CHF, also assessing for medication understanding and compliance.     Sally Warner & Alesha Lopez    AVS/Discharge Summary is the source of truth; this is a helpful guide for improved communication of patient story  "

## 2019-02-26 NOTE — PLAN OF CARE
Discharge Planner SLP   Patient plan for discharge: TCU today  Current status:  pt tolerated regular solids and thin liquids. Oral phase is slow in mastication and oral clearance but functional. No overt s/sx of aspiration     Continue regular solids and thin liquids- upright positioning, slow rate of intake, small bite/sips.     Barriers to return to prior living situation: Below baseline, cognition  Recommendations for discharge: TCU  Rationale for recommendations: Dysphagia goals met - SLP at next level of care for cog/ling deficits if warranted.     Speech Language Therapy Discharge Summary    Reason for therapy discharge:    All goals and outcomes met, no further needs identified.    Progress towards therapy goal(s). See goals on Care Plan in [facility] electronic health record for goal details.  Goals met    Therapy recommendation(s):    Continued therapy is recommended.  Rationale/Recommendations:  Dysphagia goals met, however SLP at next level of care for cog/ling deficits if felt warranted.           Entered by: Kady Randhawa 02/26/2019 11:18 AM

## 2019-02-26 NOTE — PROGRESS NOTES
Care Coordination:  Pt has orders for discharge today to TCU. Pt has been accepted at [facility]. They are able to accept her after 1300. Spoke to pt's daughter regarding discharge plan. She will transport pt and will be here at 1330 to pick her up. Bedside RN, Charge nurse updated. Orders for discharge faxed to [facility] at fax # [phone].    Kaelyn Stephens RN CTS

## 2019-02-26 NOTE — PLAN OF CARE
VSS denies pain alert/oriented to self and place disoriented to time and situation ambulates with Ax1. Tele Afib SVR w/BBB plan to discharge to TCU with a ziopatch.

## 2019-02-26 NOTE — PROGRESS NOTES
Your information has been submitted on February 26th, 2019 at 11:31:32 AM [facility]. The confirmation number is [id number]

## 2019-02-26 NOTE — PLAN OF CARE
Discharge Planner OT   Patient plan for discharge: Prefers to discharge home  Current status: Pt completed sit > stand from chair to FWW with CGA, ambulated in room FWW level with CGA/min A due to mild unsteadiness (has improved since last week). Short pathfinding task completed, pt required mod vcs for accuracy, visual cues required for location, occasional min A due to small LOB required. Pt required min A for LB dressing task while seated in chair. Pt A&Ox4.   Barriers to return to prior living situation: Current level of assist for ADLs/transfers, decreased strength/functional activity tolerance  Recommendations for discharge: ARU  Rationale for recommendations: Pt would benefit from ongoing skilled OT to maximize safety and indep in all ADLs/IADLs and mobility tasks. Pt is motivated to return to indep PLOF, has strong family support. Pt reports facility could increase services if needed.         Entered by: Blanche Watts 02/26/2019 9:46 AM       Occupational Therapy Discharge Summary    Reason for therapy discharge:    Discharged to transitional care facility.    Progress towards therapy goal(s). See goals on Care Plan in [facility] electronic health record for goal details.  Goals not met.  Barriers to achieving goals:   discharge from facility.    Therapy recommendation(s):    Continued therapy is recommended.  Rationale/Recommendations:  eval and treat at TCU post CVA.

## 2019-02-26 NOTE — PLAN OF CARE
Discharge Planner PT   Patient plan for discharge: Patient would prefer to discharge to home  Current status:  Patient up to bedside chair upon arrival; alert and eager to participate.  Patient performed sit to stand transfer from a variety of surfaces (bedside chair, hallway bench and toilet) with 2WW, CGA and limited verbal cueing for correct technique.  Patient amb 125 feet with 2WW and CGA.  Patient with improved gait pattern, decreased scissoring noted.  Maintains narrow base of support, slow gait speed, increased trunk flexion.  Patient with 4 episodes of loss of balance requiring mod A to recover.  Patient with limited awareness of loss of balance.  Patient independent with pericares in bathroom, returned to bedside chair at end of session, chair alarm on.  Patient participated in seated/standing LE exercises to promote improved coordination/strength.   Barriers to return to prior living situation: Current level of A, decreased balance, decreased tolerance to activity, safety/cognition  Recommendations for discharge: ARU   Rationale for recommendations: Patient previously independent at baseline and now requiring increased level of A for all mobility. Patient would benefit from continued intensive skilled therapy to further improve strength and balance deficits as well as address functional limitations to decrease risk of falls and improve overall mobility and ambulation prior to safe discharge home.  [facility] EMR[facility].       Entered by: Darlene Pitt 02/26/2019 8:28 AM

## 2019-02-26 NOTE — PLAN OF CARE
Pt A&O, VSS. Discharge instructions reviewed with Pt and daughter, and questions answered. Pt to discharge to TCU, transported via daughter. TCU will handle all new medication changes. Zio patch applied before discharge.

## 2019-02-27 ENCOUNTER — CARE COORDINATION (OUTPATIENT)
Dept: CARDIOLOGY | Facility: CLINIC | Age: 79
End: 2019-02-27

## 2019-02-27 NOTE — PROGRESS NOTES
Patient was evaluated by cardiology while inpatient for AFib and frequent pauses. .RN called [facility] to confirm the follow up plan for patient with Care Coordinator. Per Sfoi Nelson's note 2/15/19: She can continue follow-up with her primary cardiologist, Dr Ordoñez, at [facility]. No order was provided for cardiology follow up. Coordinator will discuss with patient to determine if she wants to follow up with her cardiologist. Confirmed pt has a ziopatch monitor on-staff to call the number on the patch with any questions.

## 2019-02-27 NOTE — DISCHARGE SUMMARY
Please see discharge summary by Verónica Claros MD dated 2/25 for description of the patient's hospital stay.  I saw Rhonda on 2/26 and she was doing well with no new issues.  Her discharge was delayed due to insurance regulations.  She was discharged in stable condition.

## 2019-02-27 NOTE — PLAN OF CARE
Physical Therapy Discharge Summary    Reason for therapy discharge:    Discharged to transitional care facility.    Progress towards therapy goal(s). See goals on Care Plan in [facility] electronic health record for goal details.  Goals not met.  Barriers to achieving goals:   discharge from facility.    Therapy recommendation(s):    Continued therapy is recommended.  Rationale/Recommendations:  Decreased independence with and tolerance for functional mobility.

## 2019-03-04 ENCOUNTER — RECORDS - HEALTHEAST (OUTPATIENT)
Dept: LAB | Facility: CLINIC | Age: 79
End: 2019-03-04

## 2019-03-04 LAB — LEVETIRACETAM (KEPPRA): 21.2 UG/ML (ref 6–46)

## 2019-08-27 ENCOUNTER — HOME CARE/HOSPICE - HEALTHEAST (OUTPATIENT)
Dept: HOME HEALTH SERVICES | Facility: HOME HEALTH | Age: 79
End: 2019-08-27

## 2019-08-30 ENCOUNTER — HOME CARE/HOSPICE - HEALTHEAST (OUTPATIENT)
Dept: HOME HEALTH SERVICES | Facility: HOME HEALTH | Age: 79
End: 2019-08-30

## 2019-09-01 ENCOUNTER — HOME CARE/HOSPICE - HEALTHEAST (OUTPATIENT)
Dept: HOME HEALTH SERVICES | Facility: HOME HEALTH | Age: 79
End: 2019-09-01

## 2019-09-03 ENCOUNTER — HOME CARE/HOSPICE - HEALTHEAST (OUTPATIENT)
Dept: HOME HEALTH SERVICES | Facility: HOME HEALTH | Age: 79
End: 2019-09-03

## 2019-09-04 ENCOUNTER — HOME CARE/HOSPICE - HEALTHEAST (OUTPATIENT)
Dept: HOME HEALTH SERVICES | Facility: HOME HEALTH | Age: 79
End: 2019-09-04

## 2019-09-06 ENCOUNTER — HOME CARE/HOSPICE - HEALTHEAST (OUTPATIENT)
Dept: HOME HEALTH SERVICES | Facility: HOME HEALTH | Age: 79
End: 2019-09-06

## 2019-09-08 ENCOUNTER — HOME CARE/HOSPICE - HEALTHEAST (OUTPATIENT)
Dept: HOME HEALTH SERVICES | Facility: HOME HEALTH | Age: 79
End: 2019-09-08

## 2019-09-09 ENCOUNTER — HOME CARE/HOSPICE - HEALTHEAST (OUTPATIENT)
Dept: HOME HEALTH SERVICES | Facility: HOME HEALTH | Age: 79
End: 2019-09-09

## 2019-09-10 ENCOUNTER — HOME CARE/HOSPICE - HEALTHEAST (OUTPATIENT)
Dept: HOME HEALTH SERVICES | Facility: HOME HEALTH | Age: 79
End: 2019-09-10

## 2019-09-11 ENCOUNTER — HOME CARE/HOSPICE - HEALTHEAST (OUTPATIENT)
Dept: HOME HEALTH SERVICES | Facility: HOME HEALTH | Age: 79
End: 2019-09-11

## 2019-09-13 ENCOUNTER — HOME CARE/HOSPICE - HEALTHEAST (OUTPATIENT)
Dept: HOME HEALTH SERVICES | Facility: HOME HEALTH | Age: 79
End: 2019-09-13

## 2019-11-25 ENCOUNTER — RECORDS - HEALTHEAST (OUTPATIENT)
Dept: LAB | Facility: CLINIC | Age: 79
End: 2019-11-25

## 2019-11-26 LAB
ANION GAP SERPL CALCULATED.3IONS-SCNC: 8 MMOL/L (ref 5–18)
BUN SERPL-MCNC: 15 MG/DL (ref 8–28)
CALCIUM SERPL-MCNC: 9.3 MG/DL (ref 8.5–10.5)
CHLORIDE BLD-SCNC: 100 MMOL/L (ref 98–107)
CO2 SERPL-SCNC: 30 MMOL/L (ref 22–31)
CREAT SERPL-MCNC: 0.81 MG/DL (ref 0.6–1.1)
ERYTHROCYTE [DISTWIDTH] IN BLOOD BY AUTOMATED COUNT: 21 % (ref 11–14.5)
GFR SERPL CREATININE-BSD FRML MDRD: >60 ML/MIN/1.73M2
GLUCOSE BLD-MCNC: 85 MG/DL (ref 70–125)
HCT VFR BLD AUTO: 40.5 % (ref 35–47)
HGB BLD-MCNC: 12.2 G/DL (ref 12–16)
MCH RBC QN AUTO: 27.8 PG (ref 27–34)
MCHC RBC AUTO-ENTMCNC: 30.1 G/DL (ref 32–36)
MCV RBC AUTO: 92 FL (ref 80–100)
PLATELET # BLD AUTO: 156 THOU/UL (ref 140–440)
PMV BLD AUTO: 11.8 FL (ref 8.5–12.5)
POTASSIUM BLD-SCNC: 3.8 MMOL/L (ref 3.5–5)
RBC # BLD AUTO: 4.39 MILL/UL (ref 3.8–5.4)
SODIUM SERPL-SCNC: 138 MMOL/L (ref 136–145)
WBC: 9.8 THOU/UL (ref 4–11)

## 2019-12-02 ENCOUNTER — RECORDS - HEALTHEAST (OUTPATIENT)
Dept: LAB | Facility: CLINIC | Age: 79
End: 2019-12-02

## 2019-12-03 LAB
ANION GAP SERPL CALCULATED.3IONS-SCNC: 13 MMOL/L (ref 5–18)
BUN SERPL-MCNC: 14 MG/DL (ref 8–28)
CALCIUM SERPL-MCNC: 9.6 MG/DL (ref 8.5–10.5)
CHLORIDE BLD-SCNC: 103 MMOL/L (ref 98–107)
CO2 SERPL-SCNC: 25 MMOL/L (ref 22–31)
CREAT SERPL-MCNC: 0.92 MG/DL (ref 0.6–1.1)
ERYTHROCYTE [DISTWIDTH] IN BLOOD BY AUTOMATED COUNT: 21.6 % (ref 11–14.5)
GFR SERPL CREATININE-BSD FRML MDRD: 59 ML/MIN/1.73M2
GLUCOSE BLD-MCNC: 124 MG/DL (ref 70–125)
HCT VFR BLD AUTO: 38.4 % (ref 35–47)
HGB BLD-MCNC: 11.5 G/DL (ref 12–16)
MCH RBC QN AUTO: 28 PG (ref 27–34)
MCHC RBC AUTO-ENTMCNC: 29.9 G/DL (ref 32–36)
MCV RBC AUTO: 94 FL (ref 80–100)
PLATELET # BLD AUTO: 258 THOU/UL (ref 140–440)
PMV BLD AUTO: 11.4 FL (ref 8.5–12.5)
POTASSIUM BLD-SCNC: 4.1 MMOL/L (ref 3.5–5)
RBC # BLD AUTO: 4.1 MILL/UL (ref 3.8–5.4)
SODIUM SERPL-SCNC: 141 MMOL/L (ref 136–145)
WBC: 6.7 THOU/UL (ref 4–11)

## 2019-12-11 ENCOUNTER — OFFICE VISIT - HEALTHEAST (OUTPATIENT)
Dept: GERIATRICS | Facility: CLINIC | Age: 79
End: 2019-12-11

## 2019-12-11 DIAGNOSIS — S42.292A: ICD-10-CM

## 2019-12-11 DIAGNOSIS — I48.0 PAROXYSMAL ATRIAL FIBRILLATION (H): ICD-10-CM

## 2019-12-11 DIAGNOSIS — E11.8 TYPE 2 DIABETES MELLITUS WITH COMPLICATION, WITHOUT LONG-TERM CURRENT USE OF INSULIN (H): ICD-10-CM

## 2019-12-11 DIAGNOSIS — S62.101A CLOSED FRACTURE OF RIGHT WRIST, INITIAL ENCOUNTER: ICD-10-CM

## 2019-12-11 DIAGNOSIS — I10 ESSENTIAL HYPERTENSION: ICD-10-CM

## 2019-12-11 DIAGNOSIS — R44.3 HALLUCINATIONS: ICD-10-CM

## 2021-05-26 VITALS
HEART RATE: 80 BPM | DIASTOLIC BLOOD PRESSURE: 60 MMHG | RESPIRATION RATE: 18 BRPM | TEMPERATURE: 98.9 F | SYSTOLIC BLOOD PRESSURE: 124 MMHG | OXYGEN SATURATION: 92 %

## 2021-05-26 VITALS — SYSTOLIC BLOOD PRESSURE: 123 MMHG | DIASTOLIC BLOOD PRESSURE: 59 MMHG | HEART RATE: 80 BPM

## 2021-06-04 NOTE — PROGRESS NOTES
[facility] Emergency Admission note      Patient: Rhonda Mathur  MRN: [id number]  Date of Service: 12/11/2019      [facility] [[id number]]  Reason for Visit     Chief Complaint   Patient presents with     H & P       Code Status     FULL CODE    Assessment     -History of and a mechanical fall precipitated by gait instability and tremors- ; prior history of falls related to same use.  -Left nondisplaced distal radial fracture  - HTN  -History of chronic back pain for which family wants to keep her on opioids.  - History of visual hallucinations.  We did have a detailed discussion with the patient and her niece who do not believe is coming from oxycodone and would like to continue with the narcotics  -History of progressive weight loss reported by patient due to declining oral intake  -History of atrial fibrillation  -Cognitive decline  -DM-2  -History of a right MCA stroke with complications including seizures and vascular dementia   -Recurrent hospitalization with patient being hospitalized and being in lengthy TCU stays with more than 6 hospitalizations noted in the last 6 months      Plan     Patient has been discharged to the TCU for strengthening and rehab.  She was examined in the presence of her niece requested to be present and wanted a medication review and care concerns reviewed with her.  Patient lives independently in her own home but has had multiple rehospitalizations with ongoing issues with confusion visual auditory hallucinations lethargy and falls.  She has had extensive work-up with no clear-cut etiology found.  They felt that this could be related to decreasing p.o. intake and dehydration and possible contribution from oxycodone which niece does not believe in.  Regarding her left nondisplaced distal radial fracture no surgical intervention is planned and she is been advised to be nonweightbearing using a platform walker.  Outpatient follow-up with orthopedics.  Pain  management optimized using Tylenol.  She is also on oxycodone I did talk to the niece who was present at her bedside who told me that they like to continue with oxycodone for now  She has chronic back pain issues which have been ongoing for months which has left her quite weak and debilitated.  Family is hoping for surgical intervention but unfortunately she has to get stronger and they are hoping that the opioids will let her function in the meantime.  Visual hallucination concerns were reviewed and there is no family history of any neurological disorder.  She denies any concerns with memory loss though she does admit that she is lost some issues with recent memories.  Family does not think that this is coming from oxycodone.  Continue to monitor this could be also in or out relating to her seizures.  They have an appointment set up with neurology to discuss this  She has new onset tremors but does not think that this is related to Parkinson's  She will follow-up with neurology to discuss this but apparently this is also resulting in weight loss we did talk about using some weighted silverware family will try this in the TCU and if this is effective we will consider getting it at home for her to  Total time spent is 45 minutes with more than 35 minutes spent face-to-face talking to the patient and her niece in her presence.  We did talk about her ongoing concerns with hallucinations and neurologically follow-up also discussed her issues with weight loss and related to tremors.  She will try weighted silverware in the TCU.  Also reviewed the loss of balance and frequent falls and re-hospitalizations.  Family has been closely observing her and keeping an eye on her but patient is agreed in agreement she may need to move to other facility may be an assisted living facility.  These were all all reviewed with my note above  Family also especially concerned about her antihypertensive dose reduction in 1 close monitoring  of her blood pressures in the TCU    History     Patient is a very pleasant 79 y.o. female who is admitted to TCU  Patient examined in the presence of her niece who supplemented her history and requested medication review.  Patient has significant gait instability due to which she has been falling.  She has a recent stroke due to which she has been using a walker in addition family has noted new onset tremors which have been bothering her quite a bit.  They feel that her gait instability is coming from both these factors.  She was brought into the hospital after she fell.  She was noted to have fracture on imaging    She also is reporting intermittent hallucinations which have been going on for almost a year.  She is not sure if this is or are related to her seizures she is on medications for that but has been having some breakthrough seizures as per her niece.  Patient continues with no psychotropic medications and regimen and plans to follow-up with neurology soon    Is an ongoing history of weight loss with some cognitive decline reported by patient and family.  Apparently tremors are limiting her ability to eat well as per her we did talk about weighted silverware modifying her diet    Past Medical History     Active Ambulatory (Non-Hospital) Problems    Diagnosis     Mixed hyperlipidemia     Closed fracture of right wrist, initial encounter     Hallucinations     Constipation due to pain medication     Fracture, humerus, anatomical neck, left, closed, initial encounter     Essential hypertension     Type 2 diabetes mellitus with complication, without long-term current use of insulin (H)     Paroxysmal atrial fibrillation (H)     Fall     Acute pain         Past Social History     Reviewed, and she  Does not have any current history of smoking or alcohol use as per patient  She lives alone but family does provide close assistance    Family History     Reviewed, and includes a history of cancer in her mother she  is not aware of any family history in her father side.  Her maternal grandmother had cataracts and glaucoma.  There is also history of diabetes in the family.    Medication List   Post Discharge Medication Reconciliation Status: discharge medications reconciled, continue medications without change   Current Outpatient Medications on File Prior to Visit   Medication Sig Dispense Refill     acetaminophen (TYLENOL) 500 MG tablet Take 1,000 mg by mouth every 6 (six) hours as needed.        albuterol (PROAIR HFA;PROVENTIL HFA;VENTOLIN HFA) 90 mcg/actuation inhaler Inhale 2 puffs every 6 (six) hours as needed for wheezing.       amLODIPine (NORVASC) 2.5 MG tablet Take 2 tablets (5 mg total) by mouth daily. 30 tablet 0     apixaban (ELIQUIS) 5 mg Tab tablet Take 5 mg by mouth 2 (two) times a day.       atorvastatin (LIPITOR) 40 MG tablet Take 40 mg by mouth every morning.       cholecalciferol, vitamin D3, 2,000 unit Tab Take 2,000 Units by mouth daily.       cyanocobalamin 1000 MCG tablet Take 1,000 mcg by mouth daily.       divalproex (DEPAKOTE DR) 250 MG 12 hour tablet Take 750 mg by mouth 2 (two) times a day.        lisinopril (PRINIVIL,ZESTRIL) 5 MG tablet Take 5 mg by mouth daily.        nitroglycerin (NITROSTAT) 0.4 MG SL tablet Place 0.4 mg under the tongue every 5 (five) minutes as needed for chest pain.       oxyCODONE (ROXICODONE) 5 MG immediate release tablet Take 1-2 tablets (5-10 mg total) by mouth every 4 (four) hours as needed for pain. Resumption of home medication to TCU. 13 tablet 0     pantoprazole (PROTONIX) 40 MG tablet Take 40 mg by mouth 2 (two) times a day.       Current Facility-Administered Medications on File Prior to Visit   Medication Dose Route Frequency Provider Last Rate Last Dose     [DISCONTINUED] acetaminophen suppository 650 mg (TYLENOL)  650 mg Rectal Q4H PRN Derek Garland MD         [DISCONTINUED] acetaminophen tablet 500-1,000 mg (TYLENOL)  500-1,000 mg Oral Q4H PRN Derek Garland MD    1,000 mg at 12/10/19 0839     [DISCONTINUED] albuterol inhaler 2 puff (PROAIR HFA;PROVENTIL HFA;VENTOLIN HFA)  2 puff Inhalation Q6H PRN Derek Garland MD         [DISCONTINUED] amLODIPine tablet 5 mg (NORVASC)  5 mg Oral DAILY Domi Guerrero MD   5 mg at 12/10/19 0604     [DISCONTINUED] apixaban tablet 5 mg (ELIQUIS)  5 mg Oral BID Derek Garland MD   5 mg at 12/10/19 0839     [DISCONTINUED] atorvastatin tablet 40 mg (LIPITOR)  40 mg Oral QAM Derek Garland MD   40 mg at 12/10/19 0840     [DISCONTINUED] bisacodyl suppository 10 mg (DULCOLAX)  10 mg Rectal Daily PRN Derek Garland MD         [DISCONTINUED] divalproex 12 hour tablet 750 mg (DEPAKOTE DR)  750 mg Oral BID Derek Garland MD   750 mg at 12/10/19 0840     [DISCONTINUED] hydrALAZINE tablet 10 mg (APRESOLINE)  10 mg Oral Q6H PRN Mohit Cassidy MD         [DISCONTINUED] lisinopril tablet 10 mg (PRINIVIL,ZESTRIL)  10 mg Oral DAILY Domi Guerrero MD   10 mg at 12/10/19 0840     [DISCONTINUED] magnesium hydroxide suspension 30 mL (MILK OF MAG)  30 mL Oral Daily PRN Derek Garland MD         [DISCONTINUED] magnesium oxide tablet 400 mg (MAG-OX)  400 mg Oral TID Mohit Cassidy MD   400 mg at 12/10/19 1448     [DISCONTINUED] melatonin tablet 3 mg  3 mg Oral Bedtime PRN Domi Guerrero MD   3 mg at 12/10/19 0337     [DISCONTINUED] naloxone injection 0.2-0.4 mg (NARCAN)  0.2-0.4 mg Intravenous PRN Derek Garland MD         [DISCONTINUED] naloxone injection 0.2-0.4 mg (NARCAN)  0.2-0.4 mg Intramuscular PRN Derek Garland MD         [DISCONTINUED] nitroglycerin SL tablet 0.4 mg (NITROSTAT)  0.4 mg Sublingual Q5 Min PRN Derek Garland MD         [DISCONTINUED] ondansetron injection 4 mg (ZOFRAN)  4 mg Intravenous Q4H PRN Derek Garland MD         [DISCONTINUED] ondansetron tablet 8 mg (ZOFRAN)  8 mg Oral Q8H PRN Derek Garland MD         [DISCONTINUED] oxyCODONE IR tablet 2.5 mg (ROXICODONE)  2.5 mg Oral Q4H PRN Derek Garland MD   2.5 mg at 12/10/19 0002     [DISCONTINUED] sodium chloride flush 2.5 mL (NS)  2.5  mL Intravenous Line Care Derek Garland MD   2.5 mL at 12/10/19 0840       Allergies     Allergies   Allergen Reactions     Nickel Itching     Adhesive Rash       Review of Systems   A comprehensive review of 14 systems was done. Pertinent findings noted here and in history of present illness. All the rest negative.  Constitutional: Negative.  Negative for fever, chills, activity change, appetite change and fatigue.   HENT: Negative for congestion and facial swelling.    Eyes: Negative for photophobia, redness and visual disturbance.   Respiratory: Negative for cough and chest tightness.    Cardiovascular: Negative for chest pain, palpitations and leg swelling.   Gastrointestinal: Negative for nausea, diarrhea, constipation, blood in stool and abdominal distention.   Genitourinary: Negative.    Musculoskeletal: Negative.    Skin: Negative.    Neurological: Negative for dizziness, tremors, syncope, weakness, light-headedness and headaches.   Hematological: Does not bruise/bleed easily.   Psychiatric/Behavioral: Negative.        Physical Exam     Recent Vitals 12/10/2019   Height -   Weight -   BSA (m2) -   /67   Pulse 62   Temp 97.6   Temp src 1   SpO2 96   Some recent data might be hidden       Constitutional: Oriented to person, place, and time and appears well-developed.   HEENT:  Normocephalic and atraumatic.  Eyes: Conjunctivae and EOM are normal. Pupils are equal, round, and reactive to light. No discharge.  No scleral icterus. Nose normal. Mouth/Throat: Oropharynx is clear and moist. No oropharyngeal exudate.    Hearing loss noted  NECK: Normal range of motion. Neck supple. No JVD present. No tracheal deviation present. No thyromegaly present.   CARDIOVASCULAR: Normal rate, regular rhythm and intact distal pulses.  Exam reveals no gallop and no friction rub.  Systolic murmur present.  PULMONARY: Effort normal and breath sounds normal. No respiratory distress.No Wheezing or rales.  ABDOMEN: Soft. Bowel sounds  are normal. No distension and no mass.  There is no tenderness. There is no rebound and no guarding. No HSM.  MUSCULOSKELETAL: Normal range of motion. No edema and no tenderness. Mild kyphosis, no tenderness.  Right wrist is in a cast with Ace wraps noted  LYMPH NODES: Has no cervical, supraclavicular, axillary and groin adenopathy.   NEUROLOGICAL: Alert and oriented to person, place, and time. No cranial nerve deficit.  Normal muscle tone. Coordination normal.  Recall is somewhat impaired  She has noted some tremors which exacerbate with movement  GENITOURINARY: Deferred exam.  SKIN: Skin is warm and dry. No rash noted. No erythema. No pallor.   EXTREMITIES: No cyanosis, no clubbing, no edema. No Deformity.  PSYCHIATRIC: Normal mood, affect and behavior.      Lab Results     Recent Results (from the past 240 hour(s))   Basic Metabolic Panel    Collection Time: 12/03/19  9:26 AM   Result Value Ref Range    Sodium 141 136 - 145 mmol/L    Potassium 4.1 3.5 - 5.0 mmol/L    Chloride 103 98 - 107 mmol/L    CO2 25 22 - 31 mmol/L    Anion Gap, Calculation 13 5 - 18 mmol/L    Glucose 124 70 - 125 mg/dL    Calcium 9.6 8.5 - 10.5 mg/dL    BUN 14 8 - 28 mg/dL    Creatinine 0.92 0.60 - 1.10 mg/dL    GFR MDRD Af Amer >60 >60 mL/min/1.73m2    GFR MDRD Non Af Amer 59 (L) >60 mL/min/1.73m2   HM2(CBC w/o Differential)    Collection Time: 12/03/19  9:26 AM   Result Value Ref Range    WBC 6.7 4.0 - 11.0 thou/uL    RBC 4.10 3.80 - 5.40 mill/uL    Hemoglobin 11.5 (L) 12.0 - 16.0 g/dL    Hematocrit 38.4 35.0 - 47.0 %    MCV 94 80 - 100 fL    MCH 28.0 27.0 - 34.0 pg    MCHC 29.9 (L) 32.0 - 36.0 g/dL    RDW 21.6 (H) 11.0 - 14.5 %    Platelets 258 140 - 440 thou/uL    MPV 11.4 8.5 - 12.5 fL   Basic Metabolic Panel    Collection Time: 12/09/19 12:18 PM   Result Value Ref Range    Sodium 142 136 - 145 mmol/L    Potassium 3.4 (L) 3.5 - 5.0 mmol/L    Chloride 101 98 - 107 mmol/L    CO2 29 22 - 31 mmol/L    Anion Gap, Calculation 12 5 - 18  mmol/L    Glucose 82 70 - 125 mg/dL    Calcium 8.9 8.5 - 10.5 mg/dL    BUN 11 8 - 28 mg/dL    Creatinine 0.86 0.60 - 1.10 mg/dL    GFR MDRD Af Amer >60 >60 mL/min/1.73m2    GFR MDRD Non Af Amer >60 >60 mL/min/1.73m2   INR    Collection Time: 12/09/19 12:18 PM   Result Value Ref Range    INR 1.16 (H) 0.90 - 1.10   APTT    Collection Time: 12/09/19 12:18 PM   Result Value Ref Range    PTT 29 24 - 37 seconds   Magnesium    Collection Time: 12/09/19 12:18 PM   Result Value Ref Range    Magnesium 1.4 (L) 1.8 - 2.6 mg/dL   HM1 (CBC with Diff)    Collection Time: 12/09/19 12:19 PM   Result Value Ref Range    WBC 6.3 4.0 - 11.0 thou/uL    RBC 3.90 3.80 - 5.40 mill/uL    Hemoglobin 11.2 (L) 12.0 - 16.0 g/dL    Hematocrit 35.8 35.0 - 47.0 %    MCV 92 80 - 100 fL    MCH 28.7 27.0 - 34.0 pg    MCHC 31.3 (L) 32.0 - 36.0 g/dL    RDW 21.0 (H) 11.0 - 14.5 %    Platelets 162 140 - 440 thou/uL    MPV 11.0 8.5 - 12.5 fL    Neutrophils % 70 50 - 70 %    Lymphocytes % 16 (L) 20 - 40 %    Monocytes % 13 (H) 2 - 10 %    Eosinophils % 1 0 - 6 %    Basophils % 0 0 - 2 %    Neutrophils Absolute 4.4 2.0 - 7.7 thou/uL    Lymphocytes Absolute 1.0 0.8 - 4.4 thou/uL    Monocytes Absolute 0.8 0.0 - 0.9 thou/uL    Eosinophils Absolute 0.0 0.0 - 0.4 thou/uL    Basophils Absolute 0.0 0.0 - 0.2 thou/uL   Manual Differential    Collection Time: 12/09/19 12:19 PM   Result Value Ref Range    Platelet Estimate Normal Normal    Polychromasia 1+ (!) Negative   Urinalysis-UC if Indicated    Collection Time: 12/09/19 12:46 PM   Result Value Ref Range    Color, UA Straw Colorless, Yellow, Straw, Light Yellow    Clarity, UA Clear Clear    Glucose, UA Negative Negative    Bilirubin, UA Negative Negative    Ketones, UA Negative Negative    Specific Gravity, UA 1.010 1.001 - 1.030    Blood, UA Negative Negative    pH, UA 6.0 4.5 - 8.0    Protein, UA Negative Negative mg/dL    Urobilinogen, UA <2.0 E.U./dL <2.0 E.U./dL, 2.0 E.U./dL    Nitrite, UA Negative Negative     Leukocytes, UA Negative Negative   Comprehensive Metabolic Panel    Collection Time: 12/09/19  5:58 PM   Result Value Ref Range    Sodium 142 136 - 145 mmol/L    Potassium 3.6 3.5 - 5.0 mmol/L    Chloride 102 98 - 107 mmol/L    CO2 30 22 - 31 mmol/L    Anion Gap, Calculation 10 5 - 18 mmol/L    Glucose 115 70 - 125 mg/dL    BUN 12 8 - 28 mg/dL    Creatinine 0.79 0.60 - 1.10 mg/dL    GFR MDRD Af Amer >60 >60 mL/min/1.73m2    GFR MDRD Non Af Amer >60 >60 mL/min/1.73m2    Bilirubin, Total 0.4 0.0 - 1.0 mg/dL    Calcium 8.6 8.5 - 10.5 mg/dL    Protein, Total 5.0 (L) 6.0 - 8.0 g/dL    Albumin 2.9 (L) 3.5 - 5.0 g/dL    Alkaline Phosphatase 50 45 - 120 U/L    AST 12 0 - 40 U/L    ALT 9 0 - 45 U/L   Comprehensive Metabolic Panel    Collection Time: 12/10/19  5:43 AM   Result Value Ref Range    Sodium 141 136 - 145 mmol/L    Potassium 3.6 3.5 - 5.0 mmol/L    Chloride 102 98 - 107 mmol/L    CO2 31 22 - 31 mmol/L    Anion Gap, Calculation 8 5 - 18 mmol/L    Glucose 81 70 - 125 mg/dL    BUN 12 8 - 28 mg/dL    Creatinine 0.79 0.60 - 1.10 mg/dL    GFR MDRD Af Amer >60 >60 mL/min/1.73m2    GFR MDRD Non Af Amer >60 >60 mL/min/1.73m2    Bilirubin, Total 0.5 0.0 - 1.0 mg/dL    Calcium 8.7 8.5 - 10.5 mg/dL    Protein, Total 5.0 (L) 6.0 - 8.0 g/dL    Albumin 2.9 (L) 3.5 - 5.0 g/dL    Alkaline Phosphatase 54 45 - 120 U/L    AST 14 0 - 40 U/L    ALT <9 0 - 45 U/L   HM2(CBC w/o Differential)    Collection Time: 12/10/19  5:43 AM   Result Value Ref Range    WBC 5.4 4.0 - 11.0 thou/uL    RBC 3.69 (L) 3.80 - 5.40 mill/uL    Hemoglobin 10.5 (L) 12.0 - 16.0 g/dL    Hematocrit 33.5 (L) 35.0 - 47.0 %    MCV 91 80 - 100 fL    MCH 28.5 27.0 - 34.0 pg    MCHC 31.3 (L) 32.0 - 36.0 g/dL    RDW 20.6 (H) 11.0 - 14.5 %    Platelets 147 140 - 440 thou/uL    MPV 11.3 8.5 - 12.5 fL   Magnesium    Collection Time: 12/10/19  5:43 AM   Result Value Ref Range    Magnesium 1.6 (L) 1.8 - 2.6 mg/dL            Imaging Results     Xr Wrist Left 3 Or More  Vws    Result Date: 12/9/2019  EXAM: XR WRIST LEFT 3 OR MORE VWS LOCATION: [facility] DATE/TIME: 12/9/2019 11:48 AM INDICATION: left wrist pain, fall COMPARISON: None.     Bones are osteopenic. Transverse nondisplaced fracture involving distal radial metaphysis. In addition, there is likely a nondisplaced fracture through tip of ulnar styloid. Carpal bones appear intact. Chondrocalcinosis involving the TFCC. Degenerative changes at base of thumb.    Ct Head Without Contrast    Result Date: 12/9/2019  EXAM: CT HEAD WO CONTRAST, CT CERVICAL SPINE WO CONTRAST LOCATION: [facility] DATE/TIME: 12/9/2019 11:35 AM INDICATION: Headache and neck pain after trauma. COMPARISON: MRI neck dated 07/16/2018, CT head dated 11/18/2019, CTA head and neck dated 11/01/2019 TECHNIQUE: HEAD CT: Without IV contrast. Multiplanar reformats. Dose reduction techniques were used. CERVICAL SPINE CT: Routine without IV contrast. Multiplanar reformats. Dose reduction techniques were used. FINDINGS: HEAD CT: INTRACRANIAL CONTENTS: No acute intracranial hemorrhage. No CT evidence of acute infarct. Scattered presumed chronic calcific thrombotic in the sulci of the right cerebral hemisphere including the sylvian fissure, which are unchanged since 11/18/2019. Sequelae of mild chronic microangiopathy. Mild cerebral volume loss without hydrocephalus. No extra-axial fluid collections.  Patent basal cisterns. VISUALIZED ORBITS/SINUSES/MASTOIDS: The orbits are unremarkable. The visualized paranasal sinuses and temporal bone structures are well-aerated. BONES/SOFT TISSUES: The calvarium and skull base are unremarkable. CERVICAL SPINE CT: VERTEBRA: The spine is imaged from the skull base through T3. Normal spinal alignment. Vertebral body heights are maintained without evidence of acute fracture. No aggressive osseous lesion.   CANAL/FORAMINA: Multilevel degenerative changes without significant spinal canal stenosis or high-grade neural  foraminal narrowing. If there is concern for ligamentous or cord injury MRI could be helpful in further evaluation. PARASPINAL: No prevertebral or paravertebral soft tissue swelling.  Visualized lungs are clear. 1.6 x 1.6 cm nodule in the right thyroid lobe, which is unchanged since since MRA neck dated 07/16/2018. Presumed sebaceous cyst in the left posterior soft tissues. Right carotid endarterectomy changes.     HEAD CT: 1. Senescent changes and sequelae of chronic microangiopathy without acute intracranial abnormality. CERVICAL SPINE CT: 1. No acute traumatic injury of the cervical spine. If there is concern for ligamentous or cord injury MRI could be helpful in further evaluation.     Ct Cervical Spine Without Contrast    Result Date: 12/9/2019  EXAM: CT HEAD WO CONTRAST, CT CERVICAL SPINE WO CONTRAST LOCATION: [facility] DATE/TIME: 12/9/2019 11:35 AM INDICATION: Headache and neck pain after trauma. COMPARISON: MRI neck dated 07/16/2018, CT head dated 11/18/2019, CTA head and neck dated 11/01/2019 TECHNIQUE: HEAD CT: Without IV contrast. Multiplanar reformats. Dose reduction techniques were used. CERVICAL SPINE CT: Routine without IV contrast. Multiplanar reformats. Dose reduction techniques were used. FINDINGS: HEAD CT: INTRACRANIAL CONTENTS: No acute intracranial hemorrhage. No CT evidence of acute infarct. Scattered presumed chronic calcific thrombotic in the sulci of the right cerebral hemisphere including the sylvian fissure, which are unchanged since 11/18/2019. Sequelae of mild chronic microangiopathy. Mild cerebral volume loss without hydrocephalus. No extra-axial fluid collections.  Patent basal cisterns. VISUALIZED ORBITS/SINUSES/MASTOIDS: The orbits are unremarkable. The visualized paranasal sinuses and temporal bone structures are well-aerated. BONES/SOFT TISSUES: The calvarium and skull base are unremarkable. CERVICAL SPINE CT: VERTEBRA: The spine is imaged from the skull base through T3.  Normal spinal alignment. Vertebral body heights are maintained without evidence of acute fracture. No aggressive osseous lesion.   CANAL/FORAMINA: Multilevel degenerative changes without significant spinal canal stenosis or high-grade neural foraminal narrowing. If there is concern for ligamentous or cord injury MRI could be helpful in further evaluation. PARASPINAL: No prevertebral or paravertebral soft tissue swelling.  Visualized lungs are clear. 1.6 x 1.6 cm nodule in the right thyroid lobe, which is unchanged since since MRA neck dated 07/16/2018. Presumed sebaceous cyst in the left posterior soft tissues. Right carotid endarterectomy changes.     HEAD CT: 1. Senescent changes and sequelae of chronic microangiopathy without acute intracranial abnormality. CERVICAL SPINE CT: 1. No acute traumatic injury of the cervical spine. If there is concern for ligamentous or cord injury MRI could be helpful in further evaluation.     Xr Chest 1 View    Result Date: 12/9/2019  EXAM: XR CHEST 1 VIEW LOCATION: [facility] DATE/TIME: 12/9/2019 11:51 AM INDICATION: altered mental status COMPARISON: 10/31/2019     Lung volumes are lower on this portable study but lungs remain clear, no pneumonia or definite pleural effusion.. There is moderate cardiomegaly which appears more pronounced than on the previous exam. Mild fullness of central hilar vessels but no interstitial edema evident. Dense atherosclerotic calcifications of aorta.    Xr Shoulder Left 2 Or More Vws    Result Date: 12/9/2019  EXAM: XR SHOULDER LEFT 2 OR MORE VWS LOCATION: [facility] DATE/TIME: 12/9/2019 11:52 AM INDICATION: left shoulder pain, fall COMPARISON: 11/18/2019     No change in the old healed oblique fracture of proximal humerus. No acute fracture evident. Alignment remains normal.             KAROLINA Leon

## 2021-06-20 NOTE — LETTER
Letter by Natasha Lozano MBBS at      Author: Natasha Lozano MBBS Service: -- Author Type: --    Filed:  Encounter Date: 12/11/2019 Status: Signed         Patient: Rhonda Mathur   MR Number: [id number]   YOB: 1940   Date of Visit: 12/11/2019       [facility] Admission note      Patient: Rhonda Mathur  MRN: [id number]  Date of Service: 12/11/2019      [facility] [[id number]]  Reason for Visit     Chief Complaint   Patient presents with   ? H & P       Code Status     FULL CODE    Assessment     -History of and a mechanical fall precipitated by gait instability and tremors- ; prior history of falls related to same use.  -Left nondisplaced distal radial fracture  - HTN  -History of chronic back pain for which family wants to keep her on opioids.  - History of visual hallucinations.  We did have a detailed discussion with the patient and her niece who do not believe is coming from oxycodone and would like to continue with the narcotics  -History of progressive weight loss reported by patient due to declining oral intake  -History of atrial fibrillation  -Cognitive decline  -DM-2  -History of a right MCA stroke with complications including seizures and vascular dementia   -Recurrent hospitalization with patient being hospitalized and being in lengthy TCU stays with more than 6 hospitalizations noted in the last 6 months      Plan     Patient has been discharged to the TCU for strengthening and rehab.  She was examined in the presence of her niece requested to be present and wanted a medication review and care concerns reviewed with her.  Patient lives independently in her own home but has had multiple rehospitalizations with ongoing issues with confusion visual auditory hallucinations lethargy and falls.  She has had extensive work-up with no clear-cut etiology found.  They felt that this could be related to decreasing p.o. intake and dehydration and possible contribution  from oxycodone which niece does not believe in.  Regarding her left nondisplaced distal radial fracture no surgical intervention is planned and she is been advised to be nonweightbearing using a platform walker.  Outpatient follow-up with orthopedics.  Pain management optimized using Tylenol.  She is also on oxycodone I did talk to the niece who was present at her bedside who told me that they like to continue with oxycodone for now  She has chronic back pain issues which have been ongoing for months which has left her quite weak and debilitated.  Family is hoping for surgical intervention but unfortunately she has to get stronger and they are hoping that the opioids will let her function in the meantime.  Visual hallucination concerns were reviewed and there is no family history of any neurological disorder.  She denies any concerns with memory loss though she does admit that she is lost some issues with recent memories.  Family does not think that this is coming from oxycodone.  Continue to monitor this could be also in or out relating to her seizures.  They have an appointment set up with neurology to discuss this  She has new onset tremors but does not think that this is related to Parkinson's  She will follow-up with neurology to discuss this but apparently this is also resulting in weight loss we did talk about using some weighted silverware family will try this in the TCU and if this is effective we will consider getting it at home for her to  Total time spent is 45 minutes with more than 35 minutes spent face-to-face talking to the patient and her niece in her presence.  We did talk about her ongoing concerns with hallucinations and neurologically follow-up also discussed her issues with weight loss and related to tremors.  She will try weighted silverware in the TCU.  Also reviewed the loss of balance and frequent falls and re-hospitalizations.  Family has been closely observing her and keeping an eye on  her but patient is agreed in agreement she may need to move to other facility may be an assisted living facility.  These were all all reviewed with my note above  Family also especially concerned about her antihypertensive dose reduction in 1 close monitoring of her blood pressures in the TCU    History     Patient is a very pleasant 79 y.o. female who is admitted to TCU  Patient examined in the presence of her niece who supplemented her history and requested medication review.  Patient has significant gait instability due to which she has been falling.  She has a recent stroke due to which she has been using a walker in addition family has noted new onset tremors which have been bothering her quite a bit.  They feel that her gait instability is coming from both these factors.  She was brought into the hospital after she fell.  She was noted to have fracture on imaging    She also is reporting intermittent hallucinations which have been going on for almost a year.  She is not sure if this is or are related to her seizures she is on medications for that but has been having some breakthrough seizures as per her niece.  Patient continues with no psychotropic medications and regimen and plans to follow-up with neurology soon    Is an ongoing history of weight loss with some cognitive decline reported by patient and family.  Apparently tremors are limiting her ability to eat well as per her we did talk about weighted silverware modifying her diet    Past Medical History     Active Ambulatory (Non-Hospital) Problems    Diagnosis   ? Mixed hyperlipidemia   ? Closed fracture of right wrist, initial encounter   ? Hallucinations   ? Constipation due to pain medication   ? Fracture, humerus, anatomical neck, left, closed, initial encounter   ? Essential hypertension   ? Type 2 diabetes mellitus with complication, without long-term current use of insulin (H)   ? Paroxysmal atrial fibrillation (H)   ? Fall   ? Acute pain          Past Social History     Reviewed, and she  Does not have any current history of smoking or alcohol use as per patient  She lives alone but family does provide close assistance    Family History     Reviewed, and includes a history of cancer in her mother she is not aware of any family history in her father side.  Her maternal grandmother had cataracts and glaucoma.  There is also history of diabetes in the family.    Medication List   Post Discharge Medication Reconciliation Status: discharge medications reconciled, continue medications without change   Current Outpatient Medications on File Prior to Visit   Medication Sig Dispense Refill   ? acetaminophen (TYLENOL) 500 MG tablet Take 1,000 mg by mouth every 6 (six) hours as needed.      ? albuterol (PROAIR HFA;PROVENTIL HFA;VENTOLIN HFA) 90 mcg/actuation inhaler Inhale 2 puffs every 6 (six) hours as needed for wheezing.     ? amLODIPine (NORVASC) 2.5 MG tablet Take 2 tablets (5 mg total) by mouth daily. 30 tablet 0   ? apixaban (ELIQUIS) 5 mg Tab tablet Take 5 mg by mouth 2 (two) times a day.     ? atorvastatin (LIPITOR) 40 MG tablet Take 40 mg by mouth every morning.     ? cholecalciferol, vitamin D3, 2,000 unit Tab Take 2,000 Units by mouth daily.     ? cyanocobalamin 1000 MCG tablet Take 1,000 mcg by mouth daily.     ? divalproex (DEPAKOTE DR) 250 MG 12 hour tablet Take 750 mg by mouth 2 (two) times a day.      ? lisinopril (PRINIVIL,ZESTRIL) 5 MG tablet Take 5 mg by mouth daily.      ? nitroglycerin (NITROSTAT) 0.4 MG SL tablet Place 0.4 mg under the tongue every 5 (five) minutes as needed for chest pain.     ? oxyCODONE (ROXICODONE) 5 MG immediate release tablet Take 1-2 tablets (5-10 mg total) by mouth every 4 (four) hours as needed for pain. Resumption of home medication to TCU. 13 tablet 0   ? pantoprazole (PROTONIX) 40 MG tablet Take 40 mg by mouth 2 (two) times a day.       Current Facility-Administered Medications on File Prior to Visit    Medication Dose Route Frequency Provider Last Rate Last Dose   ? [DISCONTINUED] acetaminophen suppository 650 mg (TYLENOL)  650 mg Rectal Q4H PRN Derek Garland MD       ? [DISCONTINUED] acetaminophen tablet 500-1,000 mg (TYLENOL)  500-1,000 mg Oral Q4H PRN Derek Garland MD   1,000 mg at 12/10/19 0839   ? [DISCONTINUED] albuterol inhaler 2 puff (PROAIR HFA;PROVENTIL HFA;VENTOLIN HFA)  2 puff Inhalation Q6H PRN Derek Garland MD       ? [DISCONTINUED] amLODIPine tablet 5 mg (NORVASC)  5 mg Oral DAILY Domi Guerrero MD   5 mg at 12/10/19 0604   ? [DISCONTINUED] apixaban tablet 5 mg (ELIQUIS)  5 mg Oral BID Derek Garland MD   5 mg at 12/10/19 0839   ? [DISCONTINUED] atorvastatin tablet 40 mg (LIPITOR)  40 mg Oral QAM Derek Garland MD   40 mg at 12/10/19 0840   ? [DISCONTINUED] bisacodyl suppository 10 mg (DULCOLAX)  10 mg Rectal Daily PRN Derek Garland MD       ? [DISCONTINUED] divalproex 12 hour tablet 750 mg (DEPAKOTE DR)  750 mg Oral BID Derek Garland MD   750 mg at 12/10/19 0840   ? [DISCONTINUED] hydrALAZINE tablet 10 mg (APRESOLINE)  10 mg Oral Q6H PRN Mohit Cassidy MD       ? [DISCONTINUED] lisinopril tablet 10 mg (PRINIVIL,ZESTRIL)  10 mg Oral DAILY Domi Guerrero MD   10 mg at 12/10/19 0840   ? [DISCONTINUED] magnesium hydroxide suspension 30 mL (MILK OF MAG)  30 mL Oral Daily PRN Derek Garland MD       ? [DISCONTINUED] magnesium oxide tablet 400 mg (MAG-OX)  400 mg Oral TID Mohit Cassidy MD   400 mg at 12/10/19 1448   ? [DISCONTINUED] melatonin tablet 3 mg  3 mg Oral Bedtime PRN Domi Guerrero MD   3 mg at 12/10/19 0337   ? [DISCONTINUED] naloxone injection 0.2-0.4 mg (NARCAN)  0.2-0.4 mg Intravenous PRN Derek Garland MD       ? [DISCONTINUED] naloxone injection 0.2-0.4 mg (NARCAN)  0.2-0.4 mg Intramuscular PRN Derek Garland MD       ? [DISCONTINUED] nitroglycerin SL tablet 0.4 mg (NITROSTAT)  0.4 mg Sublingual Q5 Min PRN Derek Garland MD       ? [DISCONTINUED] ondansetron injection 4 mg (ZOFRAN)  4 mg Intravenous Q4H PRN Derek Garland MD        ? [DISCONTINUED] ondansetron tablet 8 mg (ZOFRAN)  8 mg Oral Q8H PRN Derek Garland MD       ? [DISCONTINUED] oxyCODONE IR tablet 2.5 mg (ROXICODONE)  2.5 mg Oral Q4H PRN Derek Garland MD   2.5 mg at 12/10/19 0002   ? [DISCONTINUED] sodium chloride flush 2.5 mL (NS)  2.5 mL Intravenous Line Care Derek Garland MD   2.5 mL at 12/10/19 0840       Allergies     Allergies   Allergen Reactions   ? Nickel Itching   ? Adhesive Rash       Review of Systems   A comprehensive review of 14 systems was done. Pertinent findings noted here and in history of present illness. All the rest negative.  Constitutional: Negative.  Negative for fever, chills, activity change, appetite change and fatigue.   HENT: Negative for congestion and facial swelling.    Eyes: Negative for photophobia, redness and visual disturbance.   Respiratory: Negative for cough and chest tightness.    Cardiovascular: Negative for chest pain, palpitations and leg swelling.   Gastrointestinal: Negative for nausea, diarrhea, constipation, blood in stool and abdominal distention.   Genitourinary: Negative.    Musculoskeletal: Negative.    Skin: Negative.    Neurological: Negative for dizziness, tremors, syncope, weakness, light-headedness and headaches.   Hematological: Does not bruise/bleed easily.   Psychiatric/Behavioral: Negative.        Physical Exam     Recent Vitals 12/10/2019   Height -   Weight -   BSA (m2) -   /67   Pulse 62   Temp 97.6   Temp src 1   SpO2 96   Some recent data might be hidden       Constitutional: Oriented to person, place, and time and appears well-developed.   HEENT:  Normocephalic and atraumatic.  Eyes: Conjunctivae and EOM are normal. Pupils are equal, round, and reactive to light. No discharge.  No scleral icterus. Nose normal. Mouth/Throat: Oropharynx is clear and moist. No oropharyngeal exudate.    Hearing loss noted  NECK: Normal range of motion. Neck supple. No JVD present. No tracheal deviation present. No thyromegaly present.    CARDIOVASCULAR: Normal rate, regular rhythm and intact distal pulses.  Exam reveals no gallop and no friction rub.  Systolic murmur present.  PULMONARY: Effort normal and breath sounds normal. No respiratory distress.No Wheezing or rales.  ABDOMEN: Soft. Bowel sounds are normal. No distension and no mass.  There is no tenderness. There is no rebound and no guarding. No HSM.  MUSCULOSKELETAL: Normal range of motion. No edema and no tenderness. Mild kyphosis, no tenderness.  Right wrist is in a cast with Ace wraps noted  LYMPH NODES: Has no cervical, supraclavicular, axillary and groin adenopathy.   NEUROLOGICAL: Alert and oriented to person, place, and time. No cranial nerve deficit.  Normal muscle tone. Coordination normal.  Recall is somewhat impaired  She has noted some tremors which exacerbate with movement  GENITOURINARY: Deferred exam.  SKIN: Skin is warm and dry. No rash noted. No erythema. No pallor.   EXTREMITIES: No cyanosis, no clubbing, no edema. No Deformity.  PSYCHIATRIC: Normal mood, affect and behavior.      Lab Results     Recent Results (from the past 240 hour(s))   Basic Metabolic Panel    Collection Time: 12/03/19  9:26 AM   Result Value Ref Range    Sodium 141 136 - 145 mmol/L    Potassium 4.1 3.5 - 5.0 mmol/L    Chloride 103 98 - 107 mmol/L    CO2 25 22 - 31 mmol/L    Anion Gap, Calculation 13 5 - 18 mmol/L    Glucose 124 70 - 125 mg/dL    Calcium 9.6 8.5 - 10.5 mg/dL    BUN 14 8 - 28 mg/dL    Creatinine 0.92 0.60 - 1.10 mg/dL    GFR MDRD Af Amer >60 >60 mL/min/1.73m2    GFR MDRD Non Af Amer 59 (L) >60 mL/min/1.73m2   HM2(CBC w/o Differential)    Collection Time: 12/03/19  9:26 AM   Result Value Ref Range    WBC 6.7 4.0 - 11.0 thou/uL    RBC 4.10 3.80 - 5.40 mill/uL    Hemoglobin 11.5 (L) 12.0 - 16.0 g/dL    Hematocrit 38.4 35.0 - 47.0 %    MCV 94 80 - 100 fL    MCH 28.0 27.0 - 34.0 pg    MCHC 29.9 (L) 32.0 - 36.0 g/dL    RDW 21.6 (H) 11.0 - 14.5 %    Platelets 258 140 - 440 thou/uL    MPV  11.4 8.5 - 12.5 fL   Basic Metabolic Panel    Collection Time: 12/09/19 12:18 PM   Result Value Ref Range    Sodium 142 136 - 145 mmol/L    Potassium 3.4 (L) 3.5 - 5.0 mmol/L    Chloride 101 98 - 107 mmol/L    CO2 29 22 - 31 mmol/L    Anion Gap, Calculation 12 5 - 18 mmol/L    Glucose 82 70 - 125 mg/dL    Calcium 8.9 8.5 - 10.5 mg/dL    BUN 11 8 - 28 mg/dL    Creatinine 0.86 0.60 - 1.10 mg/dL    GFR MDRD Af Amer >60 >60 mL/min/1.73m2    GFR MDRD Non Af Amer >60 >60 mL/min/1.73m2   INR    Collection Time: 12/09/19 12:18 PM   Result Value Ref Range    INR 1.16 (H) 0.90 - 1.10   APTT    Collection Time: 12/09/19 12:18 PM   Result Value Ref Range    PTT 29 24 - 37 seconds   Magnesium    Collection Time: 12/09/19 12:18 PM   Result Value Ref Range    Magnesium 1.4 (L) 1.8 - 2.6 mg/dL   HM1 (CBC with Diff)    Collection Time: 12/09/19 12:19 PM   Result Value Ref Range    WBC 6.3 4.0 - 11.0 thou/uL    RBC 3.90 3.80 - 5.40 mill/uL    Hemoglobin 11.2 (L) 12.0 - 16.0 g/dL    Hematocrit 35.8 35.0 - 47.0 %    MCV 92 80 - 100 fL    MCH 28.7 27.0 - 34.0 pg    MCHC 31.3 (L) 32.0 - 36.0 g/dL    RDW 21.0 (H) 11.0 - 14.5 %    Platelets 162 140 - 440 thou/uL    MPV 11.0 8.5 - 12.5 fL    Neutrophils % 70 50 - 70 %    Lymphocytes % 16 (L) 20 - 40 %    Monocytes % 13 (H) 2 - 10 %    Eosinophils % 1 0 - 6 %    Basophils % 0 0 - 2 %    Neutrophils Absolute 4.4 2.0 - 7.7 thou/uL    Lymphocytes Absolute 1.0 0.8 - 4.4 thou/uL    Monocytes Absolute 0.8 0.0 - 0.9 thou/uL    Eosinophils Absolute 0.0 0.0 - 0.4 thou/uL    Basophils Absolute 0.0 0.0 - 0.2 thou/uL   Manual Differential    Collection Time: 12/09/19 12:19 PM   Result Value Ref Range    Platelet Estimate Normal Normal    Polychromasia 1+ (!) Negative   Urinalysis-UC if Indicated    Collection Time: 12/09/19 12:46 PM   Result Value Ref Range    Color, UA Straw Colorless, Yellow, Straw, Light Yellow    Clarity, UA Clear Clear    Glucose, UA Negative Negative    Bilirubin, UA Negative  Negative    Ketones, UA Negative Negative    Specific Gravity, UA 1.010 1.001 - 1.030    Blood, UA Negative Negative    pH, UA 6.0 4.5 - 8.0    Protein, UA Negative Negative mg/dL    Urobilinogen, UA <2.0 E.U./dL <2.0 E.U./dL, 2.0 E.U./dL    Nitrite, UA Negative Negative    Leukocytes, UA Negative Negative   Comprehensive Metabolic Panel    Collection Time: 12/09/19  5:58 PM   Result Value Ref Range    Sodium 142 136 - 145 mmol/L    Potassium 3.6 3.5 - 5.0 mmol/L    Chloride 102 98 - 107 mmol/L    CO2 30 22 - 31 mmol/L    Anion Gap, Calculation 10 5 - 18 mmol/L    Glucose 115 70 - 125 mg/dL    BUN 12 8 - 28 mg/dL    Creatinine 0.79 0.60 - 1.10 mg/dL    GFR MDRD Af Amer >60 >60 mL/min/1.73m2    GFR MDRD Non Af Amer >60 >60 mL/min/1.73m2    Bilirubin, Total 0.4 0.0 - 1.0 mg/dL    Calcium 8.6 8.5 - 10.5 mg/dL    Protein, Total 5.0 (L) 6.0 - 8.0 g/dL    Albumin 2.9 (L) 3.5 - 5.0 g/dL    Alkaline Phosphatase 50 45 - 120 U/L    AST 12 0 - 40 U/L    ALT 9 0 - 45 U/L   Comprehensive Metabolic Panel    Collection Time: 12/10/19  5:43 AM   Result Value Ref Range    Sodium 141 136 - 145 mmol/L    Potassium 3.6 3.5 - 5.0 mmol/L    Chloride 102 98 - 107 mmol/L    CO2 31 22 - 31 mmol/L    Anion Gap, Calculation 8 5 - 18 mmol/L    Glucose 81 70 - 125 mg/dL    BUN 12 8 - 28 mg/dL    Creatinine 0.79 0.60 - 1.10 mg/dL    GFR MDRD Af Amer >60 >60 mL/min/1.73m2    GFR MDRD Non Af Amer >60 >60 mL/min/1.73m2    Bilirubin, Total 0.5 0.0 - 1.0 mg/dL    Calcium 8.7 8.5 - 10.5 mg/dL    Protein, Total 5.0 (L) 6.0 - 8.0 g/dL    Albumin 2.9 (L) 3.5 - 5.0 g/dL    Alkaline Phosphatase 54 45 - 120 U/L    AST 14 0 - 40 U/L    ALT <9 0 - 45 U/L   HM2(CBC w/o Differential)    Collection Time: 12/10/19  5:43 AM   Result Value Ref Range    WBC 5.4 4.0 - 11.0 thou/uL    RBC 3.69 (L) 3.80 - 5.40 mill/uL    Hemoglobin 10.5 (L) 12.0 - 16.0 g/dL    Hematocrit 33.5 (L) 35.0 - 47.0 %    MCV 91 80 - 100 fL    MCH 28.5 27.0 - 34.0 pg    MCHC 31.3 (L) 32.0 -  36.0 g/dL    RDW 20.6 (H) 11.0 - 14.5 %    Platelets 147 140 - 440 thou/uL    MPV 11.3 8.5 - 12.5 fL   Magnesium    Collection Time: 12/10/19  5:43 AM   Result Value Ref Range    Magnesium 1.6 (L) 1.8 - 2.6 mg/dL            Imaging Results     Xr Wrist Left 3 Or More Vws    Result Date: 12/9/2019  EXAM: XR WRIST LEFT 3 OR MORE VWS LOCATION: [facility] DATE/TIME: 12/9/2019 11:48 AM INDICATION: left wrist pain, fall COMPARISON: None.     Bones are osteopenic. Transverse nondisplaced fracture involving distal radial metaphysis. In addition, there is likely a nondisplaced fracture through tip of ulnar styloid. Carpal bones appear intact. Chondrocalcinosis involving the TFCC. Degenerative changes at base of thumb.    Ct Head Without Contrast    Result Date: 12/9/2019  EXAM: CT HEAD WO CONTRAST, CT CERVICAL SPINE WO CONTRAST LOCATION: [facility] DATE/TIME: 12/9/2019 11:35 AM INDICATION: Headache and neck pain after trauma. COMPARISON: MRI neck dated 07/16/2018, CT head dated 11/18/2019, CTA head and neck dated 11/01/2019 TECHNIQUE: HEAD CT: Without IV contrast. Multiplanar reformats. Dose reduction techniques were used. CERVICAL SPINE CT: Routine without IV contrast. Multiplanar reformats. Dose reduction techniques were used. FINDINGS: HEAD CT: INTRACRANIAL CONTENTS: No acute intracranial hemorrhage. No CT evidence of acute infarct. Scattered presumed chronic calcific thrombotic in the sulci of the right cerebral hemisphere including the sylvian fissure, which are unchanged since 11/18/2019. Sequelae of mild chronic microangiopathy. Mild cerebral volume loss without hydrocephalus. No extra-axial fluid collections.  Patent basal cisterns. VISUALIZED ORBITS/SINUSES/MASTOIDS: The orbits are unremarkable. The visualized paranasal sinuses and temporal bone structures are well-aerated. BONES/SOFT TISSUES: The calvarium and skull base are unremarkable. CERVICAL SPINE CT: VERTEBRA: The spine is imaged from the  skull base through T3. Normal spinal alignment. Vertebral body heights are maintained without evidence of acute fracture. No aggressive osseous lesion.   CANAL/FORAMINA: Multilevel degenerative changes without significant spinal canal stenosis or high-grade neural foraminal narrowing. If there is concern for ligamentous or cord injury MRI could be helpful in further evaluation. PARASPINAL: No prevertebral or paravertebral soft tissue swelling.  Visualized lungs are clear. 1.6 x 1.6 cm nodule in the right thyroid lobe, which is unchanged since since MRA neck dated 07/16/2018. Presumed sebaceous cyst in the left posterior soft tissues. Right carotid endarterectomy changes.     HEAD CT: 1. Senescent changes and sequelae of chronic microangiopathy without acute intracranial abnormality. CERVICAL SPINE CT: 1. No acute traumatic injury of the cervical spine. If there is concern for ligamentous or cord injury MRI could be helpful in further evaluation.     Ct Cervical Spine Without Contrast    Result Date: 12/9/2019  EXAM: CT HEAD WO CONTRAST, CT CERVICAL SPINE WO CONTRAST LOCATION: [facility] DATE/TIME: 12/9/2019 11:35 AM INDICATION: Headache and neck pain after trauma. COMPARISON: MRI neck dated 07/16/2018, CT head dated 11/18/2019, CTA head and neck dated 11/01/2019 TECHNIQUE: HEAD CT: Without IV contrast. Multiplanar reformats. Dose reduction techniques were used. CERVICAL SPINE CT: Routine without IV contrast. Multiplanar reformats. Dose reduction techniques were used. FINDINGS: HEAD CT: INTRACRANIAL CONTENTS: No acute intracranial hemorrhage. No CT evidence of acute infarct. Scattered presumed chronic calcific thrombotic in the sulci of the right cerebral hemisphere including the sylvian fissure, which are unchanged since 11/18/2019. Sequelae of mild chronic microangiopathy. Mild cerebral volume loss without hydrocephalus. No extra-axial fluid collections.  Patent basal cisterns. VISUALIZED  ORBITS/SINUSES/MASTOIDS: The orbits are unremarkable. The visualized paranasal sinuses and temporal bone structures are well-aerated. BONES/SOFT TISSUES: The calvarium and skull base are unremarkable. CERVICAL SPINE CT: VERTEBRA: The spine is imaged from the skull base through T3. Normal spinal alignment. Vertebral body heights are maintained without evidence of acute fracture. No aggressive osseous lesion.   CANAL/FORAMINA: Multilevel degenerative changes without significant spinal canal stenosis or high-grade neural foraminal narrowing. If there is concern for ligamentous or cord injury MRI could be helpful in further evaluation. PARASPINAL: No prevertebral or paravertebral soft tissue swelling.  Visualized lungs are clear. 1.6 x 1.6 cm nodule in the right thyroid lobe, which is unchanged since since MRA neck dated 07/16/2018. Presumed sebaceous cyst in the left posterior soft tissues. Right carotid endarterectomy changes.     HEAD CT: 1. Senescent changes and sequelae of chronic microangiopathy without acute intracranial abnormality. CERVICAL SPINE CT: 1. No acute traumatic injury of the cervical spine. If there is concern for ligamentous or cord injury MRI could be helpful in further evaluation.     Xr Chest 1 View    Result Date: 12/9/2019  EXAM: XR CHEST 1 VIEW LOCATION: [facility] DATE/TIME: 12/9/2019 11:51 AM INDICATION: altered mental status COMPARISON: 10/31/2019     Lung volumes are lower on this portable study but lungs remain clear, no pneumonia or definite pleural effusion.. There is moderate cardiomegaly which appears more pronounced than on the previous exam. Mild fullness of central hilar vessels but no interstitial edema evident. Dense atherosclerotic calcifications of aorta.    Xr Shoulder Left 2 Or More Vws    Result Date: 12/9/2019  EXAM: XR SHOULDER LEFT 2 OR MORE VWS LOCATION: [facility] DATE/TIME: 12/9/2019 11:52 AM INDICATION: left shoulder pain, fall COMPARISON: 11/18/2019      No change in the old healed oblique fracture of proximal humerus. No acute fracture evident. Alignment remains normal.             KAROLINA Leon

## (undated) DEVICE — BAG CLEAR TRASH 1.3M 39X33" P4040C

## (undated) DEVICE — ENDO FORCEP ENDOJAW BIOPSY 2.8MMX160CM FB-220K

## (undated) DEVICE — TUBING SUCTION 12"X1/4" N612

## (undated) DEVICE — GOWN XLG DISP 9545

## (undated) DEVICE — PAD CHUX UNDERPAD 23X24" 7136

## (undated) DEVICE — SUCTION CANISTER MEDIVAC LINER 3000ML W/LID 65651-530

## (undated) DEVICE — BAG RED BIOHAZARD 30.5X43" G4300XR

## (undated) DEVICE — KIT PROCEDURE W/CLEAN-A-SCOPE LINERS V2 200800

## (undated) DEVICE — SOL WATER IRRIG 1000ML BOTTLE 2F7114

## (undated) RX ORDER — FENTANYL CITRATE 50 UG/ML
INJECTION, SOLUTION INTRAMUSCULAR; INTRAVENOUS
Status: DISPENSED
Start: 2019-02-14

## (undated) RX ORDER — ONDANSETRON 2 MG/ML
INJECTION INTRAMUSCULAR; INTRAVENOUS
Status: DISPENSED
Start: 2019-02-14

## (undated) RX ORDER — KETAMINE HCL IN 0.9 % NACL 50 MG/5 ML
SYRINGE (ML) INTRAVENOUS
Status: DISPENSED
Start: 2019-02-14

## (undated) RX ORDER — GLYCOPYRROLATE 0.2 MG/ML
INJECTION INTRAMUSCULAR; INTRAVENOUS
Status: DISPENSED
Start: 2019-02-14